# Patient Record
Sex: MALE | Race: WHITE | NOT HISPANIC OR LATINO | Employment: OTHER | URBAN - METROPOLITAN AREA
[De-identification: names, ages, dates, MRNs, and addresses within clinical notes are randomized per-mention and may not be internally consistent; named-entity substitution may affect disease eponyms.]

---

## 2020-01-06 ENCOUNTER — TELEPHONE (OUTPATIENT)
Dept: ORTHOPEDIC SURGERY | Facility: CLINIC | Age: 70
End: 2020-01-06

## 2020-01-20 ENCOUNTER — APPOINTMENT (OUTPATIENT)
Dept: CT IMAGING | Facility: HOSPITAL | Age: 70
End: 2020-01-20

## 2020-01-20 ENCOUNTER — HOSPITAL ENCOUNTER (EMERGENCY)
Facility: HOSPITAL | Age: 70
Discharge: HOME | End: 2020-01-20
Attending: EMERGENCY MEDICINE | Admitting: EMERGENCY MEDICINE

## 2020-01-20 VITALS
RESPIRATION RATE: 16 BRPM | HEART RATE: 60 BPM | DIASTOLIC BLOOD PRESSURE: 65 MMHG | BODY MASS INDEX: 34.15 KG/M2 | TEMPERATURE: 97.6 F | HEIGHT: 64 IN | SYSTOLIC BLOOD PRESSURE: 108 MMHG | WEIGHT: 200 LBS | OXYGEN SATURATION: 99 %

## 2020-01-20 DIAGNOSIS — R51.9 NONINTRACTABLE EPISODIC HEADACHE, UNSPECIFIED HEADACHE TYPE: Primary | ICD-10-CM

## 2020-01-20 PROCEDURE — 25010000002 HYDROMORPHONE PER 4 MG: Performed by: EMERGENCY MEDICINE

## 2020-01-20 PROCEDURE — 63710000001 PREDNISONE PER 1 MG: Performed by: EMERGENCY MEDICINE

## 2020-01-20 PROCEDURE — 25010000002 PROMETHAZINE PER 50 MG: Performed by: EMERGENCY MEDICINE

## 2020-01-20 PROCEDURE — 99283 EMERGENCY DEPT VISIT LOW MDM: CPT

## 2020-01-20 PROCEDURE — 70450 CT HEAD/BRAIN W/O DYE: CPT

## 2020-01-20 PROCEDURE — 96372 THER/PROPH/DIAG INJ SC/IM: CPT

## 2020-01-20 PROCEDURE — 99282 EMERGENCY DEPT VISIT SF MDM: CPT | Performed by: EMERGENCY MEDICINE

## 2020-01-20 RX ORDER — PREDNISONE 20 MG/1
60 TABLET ORAL ONCE
Status: COMPLETED | OUTPATIENT
Start: 2020-01-20 | End: 2020-01-20

## 2020-01-20 RX ORDER — HYDROMORPHONE HYDROCHLORIDE 2 MG/1
2 TABLET ORAL EVERY 4 HOURS PRN
COMMUNITY
End: 2020-01-20

## 2020-01-20 RX ORDER — HYDROMORPHONE HCL 110MG/55ML
2 PATIENT CONTROLLED ANALGESIA SYRINGE INTRAVENOUS ONCE
Status: COMPLETED | OUTPATIENT
Start: 2020-01-20 | End: 2020-01-20

## 2020-01-20 RX ORDER — PROMETHAZINE HYDROCHLORIDE 25 MG/ML
12.5 INJECTION, SOLUTION INTRAMUSCULAR; INTRAVENOUS ONCE
Status: COMPLETED | OUTPATIENT
Start: 2020-01-20 | End: 2020-01-20

## 2020-01-20 RX ORDER — BUTALBITAL, ACETAMINOPHEN AND CAFFEINE 50; 325; 40 MG/1; MG/1; MG/1
TABLET ORAL
Qty: 20 TABLET | Refills: 0 | Status: SHIPPED | OUTPATIENT
Start: 2020-01-20 | End: 2020-05-18

## 2020-01-20 RX ADMIN — PROMETHAZINE HYDROCHLORIDE 12.5 MG: 25 INJECTION INTRAMUSCULAR; INTRAVENOUS at 16:28

## 2020-01-20 RX ADMIN — HYDROMORPHONE HYDROCHLORIDE 2 MG: 2 INJECTION, SOLUTION INTRAMUSCULAR; INTRAVENOUS; SUBCUTANEOUS at 16:28

## 2020-01-20 RX ADMIN — PREDNISONE 60 MG: 20 TABLET ORAL at 16:27
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
## 2020-01-24 ENCOUNTER — TRANSCRIBE ORDERS (OUTPATIENT)
Dept: ADMINISTRATIVE | Facility: HOSPITAL | Age: 70
End: 2020-01-24

## 2020-01-24 DIAGNOSIS — M54.31 BILATERAL SCIATICA: ICD-10-CM

## 2020-01-24 DIAGNOSIS — M48.062 LUMBAR STENOSIS WITH NEUROGENIC CLAUDICATION: ICD-10-CM

## 2020-01-24 DIAGNOSIS — M51.36 DEGENERATIVE LUMBAR DISC: Primary | ICD-10-CM

## 2020-01-24 DIAGNOSIS — M54.32 BILATERAL SCIATICA: ICD-10-CM

## 2020-01-28 ENCOUNTER — HOSPITAL ENCOUNTER (OUTPATIENT)
Dept: GENERAL RADIOLOGY | Facility: HOSPITAL | Age: 70
Discharge: HOME | End: 2020-01-28

## 2020-01-28 ENCOUNTER — TRANSCRIBE ORDERS (OUTPATIENT)
Dept: ADMINISTRATIVE | Facility: HOSPITAL | Age: 70
End: 2020-01-28

## 2020-01-28 ENCOUNTER — HOSPITAL ENCOUNTER (OUTPATIENT)
Dept: MRI IMAGING | Facility: HOSPITAL | Age: 70
Discharge: HOME | End: 2020-01-28
Admitting: ORTHOPAEDIC SURGERY

## 2020-01-28 DIAGNOSIS — M54.32 BILATERAL SCIATICA: ICD-10-CM

## 2020-01-28 DIAGNOSIS — M54.9 BACK PAIN, UNSPECIFIED BACK LOCATION, UNSPECIFIED BACK PAIN LATERALITY, UNSPECIFIED CHRONICITY: ICD-10-CM

## 2020-01-28 DIAGNOSIS — M54.2 NECK PAIN: ICD-10-CM

## 2020-01-28 DIAGNOSIS — M48.062 LUMBAR STENOSIS WITH NEUROGENIC CLAUDICATION: ICD-10-CM

## 2020-01-28 DIAGNOSIS — M54.31 BILATERAL SCIATICA: ICD-10-CM

## 2020-01-28 DIAGNOSIS — M54.9 BACK PAIN, UNSPECIFIED BACK LOCATION, UNSPECIFIED BACK PAIN LATERALITY, UNSPECIFIED CHRONICITY: Primary | ICD-10-CM

## 2020-01-28 DIAGNOSIS — M51.36 DEGENERATIVE LUMBAR DISC: ICD-10-CM

## 2020-01-28 PROCEDURE — 72040 X-RAY EXAM NECK SPINE 2-3 VW: CPT

## 2020-01-28 PROCEDURE — 72110 X-RAY EXAM L-2 SPINE 4/>VWS: CPT

## 2020-01-28 PROCEDURE — 72148 MRI LUMBAR SPINE W/O DYE: CPT

## 2020-03-10 ENCOUNTER — TRANSCRIBE ORDERS (OUTPATIENT)
Dept: ADMINISTRATIVE | Facility: HOSPITAL | Age: 70
End: 2020-03-10

## 2020-03-10 DIAGNOSIS — N18.9 CHRONIC KIDNEY DISEASE, UNSPECIFIED CKD STAGE: ICD-10-CM

## 2020-03-10 DIAGNOSIS — R10.13 EPIGASTRIC PAIN: Primary | ICD-10-CM

## 2020-03-10 DIAGNOSIS — E11.42 TYPE 2 DIABETES MELLITUS WITH DIABETIC POLYNEUROPATHY, UNSPECIFIED WHETHER LONG TERM INSULIN USE (HCC): ICD-10-CM

## 2020-03-18 ENCOUNTER — OFFICE VISIT (OUTPATIENT)
Dept: SURGERY | Facility: CLINIC | Age: 70
End: 2020-03-18

## 2020-03-18 VITALS
DIASTOLIC BLOOD PRESSURE: 86 MMHG | WEIGHT: 207 LBS | BODY MASS INDEX: 35.34 KG/M2 | HEART RATE: 64 BPM | SYSTOLIC BLOOD PRESSURE: 132 MMHG | HEIGHT: 64 IN | RESPIRATION RATE: 18 BRPM | OXYGEN SATURATION: 98 %

## 2020-03-18 DIAGNOSIS — K62.5 RECTAL BLEEDING: ICD-10-CM

## 2020-03-18 DIAGNOSIS — R11.2 NAUSEA AND VOMITING, INTRACTABILITY OF VOMITING NOT SPECIFIED, UNSPECIFIED VOMITING TYPE: Primary | ICD-10-CM

## 2020-03-18 PROCEDURE — 99204 OFFICE O/P NEW MOD 45 MIN: CPT | Performed by: SURGERY

## 2020-03-18 NOTE — PROGRESS NOTES
Chief Complaint   Patient presents with   • Mass     abdominal    • Hemorrhoids   • Nausea        Patient is a 70 y.o. male referred by Devon Monroe DO for nausea, vomiting and rectal bleeding.  Patient reports he has had problems with hemorrhoids intermittently for several years.  Patient does not recall the last time he had a colonoscopy.  Patient reports that he stays nauseated and feels like he needs to vomit and sometimes he does vomit its green.  Patient denies hematemesis.  Patient noticed a big mass on his abdomen and he is scheduled for a CT scan to further evaluate this.  Patient denies fever, chills, unexplained weight loss or night sweats.  Patient has not had any change in bowel habits.  Patient denies any family history of ulcerative colitis, Crohn's disease, familial polyposis or colon cancer.  Patient reports that when he has bowel movements it is bright red blood in the toilet and no associated pain.    Past Medical History:   Diagnosis Date   • Congestive cardiac failure (CMS/HCC)    • Diabetes mellitus (CMS/HCC)    • Heart disease    • Hyperlipidemia    • Hypertension      Past Surgical History:   Procedure Laterality Date   • CARDIAC SURGERY      x2   • CERVICAL SPINE SURGERY       History reviewed. No pertinent family history.  Social History     Tobacco Use   • Smoking status: Never Smoker   • Smokeless tobacco: Never Used   Substance Use Topics   • Alcohol use: Not Currently   • Drug use: Never     No Known Allergies    Current Outpatient Medications:   •  butalbital-acetaminophen-caffeine (FIORICET, ESGIC) -40 MG per tablet, 2 tablets by mouth every 8 hours as needed for headache, Disp: 20 tablet, Rfl: 0  •  clopidogrel (PLAVIX) 75 MG tablet, Take 75 mg by mouth Daily., Disp: , Rfl:   •  glipizide (GLUCOTROL) 10 MG tablet, Take 10 mg by mouth 2 (Two) Times a Day Before Meals., Disp: , Rfl:   •  insulin lispro (humaLOG) 100 UNIT/ML injection, Inject  under the skin into the  "appropriate area as directed 3 (Three) Times a Day Before Meals., Disp: , Rfl:   •  isosorbide dinitrate (ISORDIL) 10 MG tablet, Take 10 mg by mouth 3 (Three) Times a Day., Disp: , Rfl:   •  losartan (COZAAR) 50 MG tablet, Take 100 mg by mouth Daily., Disp: , Rfl:   •  metFORMIN ER (GLUCOPHAGE-XR) 750 MG 24 hr tablet, Take 750 mg by mouth Daily With Breakfast., Disp: , Rfl:   •  metoprolol succinate XL (TOPROL-XL) 50 MG 24 hr tablet, Take 50 mg by mouth Daily., Disp: , Rfl:   •  omeprazole (priLOSEC) 40 MG capsule, Take 40 mg by mouth Daily., Disp: , Rfl:     Review of Systems  General: Patient reports during good health  Eyes: No eye problems  Ears, nose, mouth and throat: No rhinitis, no hearing problems, no chronic cough  Cardiovascular/heart: Denies palpitations, syncope or chest pain  Respiratory/lung: Denies shortness of breath, hemoptysis, dyspnea on exertion   Genital/urinary: No frequency, hematuria or dysuria  Hematological/lymphatic: Denies anemia or other problems  Musculoskeletal: No joint pain, no defects  Skin: No psoriasis or other skin issues  Neurological: No seizures or other neurological problems  Psychiatric: None  Endocrine: Diabetic  Gastro-intestinal: No constipation, no diarrhea, reflux, see HPI  Vitals:    03/18/20 1018   BP: 132/86   BP Location: Left arm   Patient Position: Sitting   Cuff Size: Large Adult   Pulse: 64   Resp: 18   SpO2: 98%   Weight: 93.9 kg (207 lb)   Height: 162.6 cm (64.02\")       Physical Exam  General/physcological:   Alert and oriented x3, in no acute distress  HEENT: Normal cephalic, atraumatic, PERRLA, EOMI, sclera anicteric, moist mucous membranes, neck is supple, no JVD, no carotid bruits, no thyromegaly no adenopathy  Respiratory: CTA and percussion  CVA: RRR, normal S1-S2, no murmurs, no gallops or rubs  GI: Positive BS, soft, nondistended, nontender, no rebound, no guarding, no hernias, no organomegaly and no palpable masses  Patient has rectus " diastases  Musculoskeletal: Moves all 4 ext, no clubbing, no cyanosis or edema  Neurovascular: Grossly intact  Debilities: none  Emotional behavior: appropriate     Patient does not use tobacco products currently.     Assessment:  Nausea, vomiting, reflux  Rectal bleeding  Abdominal mass  Plan:  I recommended the patient undergo further evaluation with a CT scan of the abdomen and pelvis. I also recommend that the patient undergo evaluation of the gallbladder.  I have recommended that the patient undergo an EGD and colonoscopy.  I have discussed these procedures in detail with the patient and his wife who has accompanied him.  I have discussed the risks, benefits and alternatives.  I have discussed the risk of anesthesia, bleeding and perforation.  Patient understands these risks, benefits and alternatives and wishes to proceed.  I have advised the patient unless the CT scan shows some abnormalities that we will postpone the EGD and colonoscopy until further notice secondary to the epidemic of the coronavirus.    Sobia Nuno MD  General, Minimally Invasive and Endoscopic Surgery  [facility]      [address]    [address]  [address] [address]    P: [phone]  F: [phone]    Cc:  Devon Monroe,

## 2020-03-23 ENCOUNTER — HOSPITAL ENCOUNTER (OUTPATIENT)
Dept: CT IMAGING | Facility: HOSPITAL | Age: 70
Discharge: HOME | End: 2020-03-23
Admitting: FAMILY MEDICINE

## 2020-03-23 DIAGNOSIS — N18.9 CHRONIC KIDNEY DISEASE, UNSPECIFIED CKD STAGE: ICD-10-CM

## 2020-03-23 DIAGNOSIS — E11.42 TYPE 2 DIABETES MELLITUS WITH DIABETIC POLYNEUROPATHY, UNSPECIFIED WHETHER LONG TERM INSULIN USE (HCC): ICD-10-CM

## 2020-03-23 DIAGNOSIS — R10.13 EPIGASTRIC PAIN: ICD-10-CM

## 2020-03-23 PROCEDURE — 0 DIATRIZOATE MEGLUMINE & SODIUM PER 1 ML: Performed by: FAMILY MEDICINE

## 2020-03-23 PROCEDURE — 74176 CT ABD & PELVIS W/O CONTRAST: CPT

## 2020-03-23 RX ADMIN — DIATRIZOATE MEGLUMINE AND DIATRIZOATE SODIUM 30 ML: 600; 100 SOLUTION ORAL; RECTAL at 09:10

## 2020-05-06 PROBLEM — R11.2 NAUSEA AND VOMITING: Status: ACTIVE | Noted: 2020-05-06

## 2020-05-06 PROBLEM — K62.5 RECTAL BLEEDING: Status: ACTIVE | Noted: 2020-05-06

## 2020-05-17 ENCOUNTER — HOSPITAL ENCOUNTER (EMERGENCY)
Facility: HOSPITAL | Age: 70
Discharge: HOME | End: 2020-05-17
Attending: EMERGENCY MEDICINE | Admitting: EMERGENCY MEDICINE

## 2020-05-17 ENCOUNTER — APPOINTMENT (OUTPATIENT)
Dept: GENERAL RADIOLOGY | Facility: HOSPITAL | Age: 70
End: 2020-05-17

## 2020-05-17 VITALS
DIASTOLIC BLOOD PRESSURE: 79 MMHG | RESPIRATION RATE: 16 BRPM | HEIGHT: 64 IN | SYSTOLIC BLOOD PRESSURE: 148 MMHG | WEIGHT: 200 LBS | BODY MASS INDEX: 34.15 KG/M2 | HEART RATE: 63 BPM | OXYGEN SATURATION: 97 % | TEMPERATURE: 97.5 F

## 2020-05-17 DIAGNOSIS — I20.8 STABLE ANGINA (HCC): Primary | ICD-10-CM

## 2020-05-17 LAB
ALBUMIN SERPL-MCNC: 4 G/DL (ref 3.5–5.2)
ALBUMIN/GLOB SERPL: 1.3 G/DL
ALP SERPL-CCNC: 78 U/L (ref 39–117)
ALT SERPL W P-5'-P-CCNC: 11 U/L (ref 1–41)
ANION GAP SERPL CALCULATED.3IONS-SCNC: 13.6 MMOL/L (ref 5–15)
AST SERPL-CCNC: 13 U/L (ref 1–40)
BASOPHILS # BLD AUTO: 0.13 10*3/MM3 (ref 0–0.2)
BASOPHILS NFR BLD AUTO: 1.5 % (ref 0–1.5)
BILIRUB SERPL-MCNC: 0.5 MG/DL (ref 0.2–1.2)
BUN BLD-MCNC: 25 MG/DL (ref 8–23)
BUN/CREAT SERPL: 19.2 (ref 7–25)
CALCIUM SPEC-SCNC: 9.7 MG/DL (ref 8.6–10.5)
CHLORIDE SERPL-SCNC: 102 MMOL/L (ref 98–107)
CO2 SERPL-SCNC: 23.4 MMOL/L (ref 22–29)
CREAT BLD-MCNC: 1.3 MG/DL (ref 0.76–1.27)
DEPRECATED RDW RBC AUTO: 46.1 FL (ref 37–54)
EOSINOPHIL # BLD AUTO: 0.61 10*3/MM3 (ref 0–0.4)
EOSINOPHIL NFR BLD AUTO: 7.2 % (ref 0.3–6.2)
ERYTHROCYTE [DISTWIDTH] IN BLOOD BY AUTOMATED COUNT: 13.2 % (ref 12.3–15.4)
GFR SERPL CREATININE-BSD FRML MDRD: 55 ML/MIN/1.73
GLOBULIN UR ELPH-MCNC: 3 GM/DL
GLUCOSE BLD-MCNC: 182 MG/DL (ref 65–99)
HCT VFR BLD AUTO: 50.7 % (ref 37.5–51)
HGB BLD-MCNC: 16.1 G/DL (ref 13–17.7)
IMM GRANULOCYTES # BLD AUTO: 0.07 10*3/MM3 (ref 0–0.05)
IMM GRANULOCYTES NFR BLD AUTO: 0.8 % (ref 0–0.5)
LYMPHOCYTES # BLD AUTO: 3.05 10*3/MM3 (ref 0.7–3.1)
LYMPHOCYTES NFR BLD AUTO: 35.9 % (ref 19.6–45.3)
MCH RBC QN AUTO: 30.2 PG (ref 26.6–33)
MCHC RBC AUTO-ENTMCNC: 31.8 G/DL (ref 31.5–35.7)
MCV RBC AUTO: 95.1 FL (ref 79–97)
MONOCYTES # BLD AUTO: 0.69 10*3/MM3 (ref 0.1–0.9)
MONOCYTES NFR BLD AUTO: 8.1 % (ref 5–12)
NEUTROPHILS # BLD AUTO: 3.94 10*3/MM3 (ref 1.7–7)
NEUTROPHILS NFR BLD AUTO: 46.5 % (ref 42.7–76)
NRBC BLD AUTO-RTO: 0 /100 WBC (ref 0–0.2)
PLATELET # BLD AUTO: 160 10*3/MM3 (ref 140–450)
PMV BLD AUTO: 10.6 FL (ref 6–12)
POTASSIUM BLD-SCNC: 5 MMOL/L (ref 3.5–5.2)
PROT SERPL-MCNC: 7 G/DL (ref 6–8.5)
RBC # BLD AUTO: 5.33 10*6/MM3 (ref 4.14–5.8)
SODIUM BLD-SCNC: 139 MMOL/L (ref 136–145)
TROPONIN T SERPL-MCNC: <0.01 NG/ML (ref 0–0.03)
WBC NRBC COR # BLD: 8.49 10*3/MM3 (ref 3.4–10.8)

## 2020-05-17 PROCEDURE — 71045 X-RAY EXAM CHEST 1 VIEW: CPT

## 2020-05-17 PROCEDURE — 25010000002 ONDANSETRON PER 1 MG: Performed by: EMERGENCY MEDICINE

## 2020-05-17 PROCEDURE — 93010 ELECTROCARDIOGRAM REPORT: CPT | Performed by: INTERNAL MEDICINE

## 2020-05-17 PROCEDURE — 80053 COMPREHEN METABOLIC PANEL: CPT | Performed by: EMERGENCY MEDICINE

## 2020-05-17 PROCEDURE — 84484 ASSAY OF TROPONIN QUANT: CPT | Performed by: EMERGENCY MEDICINE

## 2020-05-17 PROCEDURE — 96375 TX/PRO/DX INJ NEW DRUG ADDON: CPT

## 2020-05-17 PROCEDURE — 93005 ELECTROCARDIOGRAM TRACING: CPT | Performed by: EMERGENCY MEDICINE

## 2020-05-17 PROCEDURE — 85025 COMPLETE CBC W/AUTO DIFF WBC: CPT | Performed by: EMERGENCY MEDICINE

## 2020-05-17 PROCEDURE — 99284 EMERGENCY DEPT VISIT MOD MDM: CPT | Performed by: EMERGENCY MEDICINE

## 2020-05-17 PROCEDURE — 96374 THER/PROPH/DIAG INJ IV PUSH: CPT

## 2020-05-17 PROCEDURE — 99283 EMERGENCY DEPT VISIT LOW MDM: CPT

## 2020-05-17 RX ORDER — FAMOTIDINE 10 MG/ML
20 INJECTION, SOLUTION INTRAVENOUS ONCE
Status: COMPLETED | OUTPATIENT
Start: 2020-05-17 | End: 2020-05-17

## 2020-05-17 RX ORDER — ASPIRIN 325 MG
325 TABLET ORAL ONCE
Status: DISCONTINUED | OUTPATIENT
Start: 2020-05-17 | End: 2020-05-17

## 2020-05-17 RX ORDER — ONDANSETRON 2 MG/ML
8 INJECTION INTRAMUSCULAR; INTRAVENOUS ONCE
Status: COMPLETED | OUTPATIENT
Start: 2020-05-17 | End: 2020-05-17

## 2020-05-17 RX ADMIN — FAMOTIDINE 20 MG: 10 INJECTION INTRAVENOUS at 10:47

## 2020-05-17 RX ADMIN — ONDANSETRON 8 MG: 2 INJECTION, SOLUTION INTRAMUSCULAR; INTRAVENOUS at 10:47

## 2020-05-17 NOTE — ED PROVIDER NOTES
"Subjective     Chest Pain   Pain location:  R chest  Pain quality: aching    Pain radiates to:  Does not radiate  Pain severity:  Moderate  Onset quality:  Gradual  Duration: \"few hours today\" at rest, intermittent several months.  Timing:  Intermittent  Progression:  Waxing and waning  Chronicity:  Recurrent  Context comment:  Spont onset, took asa/nitro pta, better now  Relieved by:  Nothing  Worsened by:  Nothing  Associated symptoms: nausea    Associated symptoms: no abdominal pain, no back pain, no cough, no diaphoresis, no shortness of breath and no vomiting        Review of Systems   Constitutional: Negative for diaphoresis.   Respiratory: Negative for cough and shortness of breath.    Cardiovascular: Positive for chest pain.   Gastrointestinal: Positive for nausea. Negative for abdominal pain and vomiting.   Musculoskeletal: Negative for back pain.   All other systems reviewed and are negative.      Past Medical History:   Diagnosis Date   • Congestive cardiac failure (CMS/HCC)    • Diabetes mellitus (CMS/HCC)    • Heart disease    • Hyperlipidemia    • Hypertension        No Known Allergies    Past Surgical History:   Procedure Laterality Date   • CARDIAC SURGERY      x2   • CERVICAL SPINE SURGERY         Family History   Problem Relation Age of Onset   • Heart attack Mother    • Emphysema Father    • Heart attack Father    • Stroke Father    • Arthritis Father    • Arthritis Sister    • Sudden death Brother         shot   • No Known Problems Daughter    • No Known Problems Son    • No Known Problems Maternal Grandmother    • No Known Problems Maternal Grandfather    • Emphysema Paternal Grandmother    • Diabetes Paternal Grandfather    • No Known Problems Sister    • No Known Problems Sister    • No Known Problems Sister    • No Known Problems Sister    • No Known Problems Sister    • No Known Problems Daughter    • No Known Problems Daughter        Social History     Socioeconomic History   • Marital " status:      Spouse name: Not on file   • Number of children: Not on file   • Years of education: Not on file   • Highest education level: Not on file   Tobacco Use   • Smoking status: Never Smoker   • Smokeless tobacco: Never Used   Substance and Sexual Activity   • Alcohol use: Not Currently   • Drug use: Never   • Sexual activity: Defer           Objective   Physical Exam   Constitutional: He is oriented to person, place, and time. He appears well-developed and well-nourished.  Non-toxic appearance. He does not appear ill. No distress.   HENT:   Head: Normocephalic and atraumatic.   Eyes: Pupils are equal, round, and reactive to light. EOM are normal.   Neck: Normal range of motion. No JVD present.   Cardiovascular: Normal rate, regular rhythm, intact distal pulses and normal pulses. Exam reveals no gallop.   No murmur heard.  Pulmonary/Chest: Effort normal and breath sounds normal. He has no decreased breath sounds. He has no wheezes. He has no rhonchi.   Musculoskeletal: Normal range of motion.        Right lower leg: Normal. He exhibits no edema.        Left lower leg: Normal. He exhibits no edema.   Neurological: He is alert and oriented to person, place, and time. He is not disoriented.   Skin: Skin is warm. No rash noted. He is not diaphoretic. No erythema.   Psychiatric: He has a normal mood and affect. His mood appears not anxious.   Nursing note and vitals reviewed.      Procedures           ED Course  ED Course as of May 17 1146   Sun May 17, 2020   1042 Ekg by mensr, low volt, no st elev    [BC]      ED Course User Index  [BC] Jimmy Ackerman MD         reeval, sleeping in room, no compliaints  Says he has a cards to f/u with  Ok with plan to f/u and return for worse                                  MDM  Number of Diagnoses or Management Options     Amount and/or Complexity of Data Reviewed  Clinical lab tests: ordered and reviewed  Tests in the radiology section of CPT®: ordered and  reviewed    Risk of Complications, Morbidity, and/or Mortality  Presenting problems: moderate  Diagnostic procedures: moderate  Management options: moderate    Patient Progress  Patient progress: stable      Final diagnoses:   Stable angina (CMS/[facility])            Jimmy Ackerman MD  05/17/20 1149

## 2020-05-18 ENCOUNTER — APPOINTMENT (OUTPATIENT)
Dept: CARDIOLOGY | Facility: HOSPITAL | Age: 70
End: 2020-05-18

## 2020-05-18 ENCOUNTER — HOSPITAL ENCOUNTER (OUTPATIENT)
Facility: HOSPITAL | Age: 70
LOS: 1 days | Discharge: HOME | End: 2020-05-20
Attending: EMERGENCY MEDICINE | Admitting: INTERNAL MEDICINE

## 2020-05-18 ENCOUNTER — TRANSCRIBE ORDERS (OUTPATIENT)
Dept: ADMINISTRATIVE | Facility: HOSPITAL | Age: 70
End: 2020-05-18

## 2020-05-18 ENCOUNTER — APPOINTMENT (OUTPATIENT)
Dept: GENERAL RADIOLOGY | Facility: HOSPITAL | Age: 70
End: 2020-05-18

## 2020-05-18 DIAGNOSIS — Z79.4 TYPE 2 DIABETES MELLITUS WITH OTHER CIRCULATORY COMPLICATION, WITH LONG-TERM CURRENT USE OF INSULIN (HCC): Chronic | ICD-10-CM

## 2020-05-18 DIAGNOSIS — N18.30 CHRONIC RENAL FAILURE, STAGE 3 (MODERATE) (HCC): Chronic | ICD-10-CM

## 2020-05-18 DIAGNOSIS — R94.31 ABNORMAL EKG: ICD-10-CM

## 2020-05-18 DIAGNOSIS — D75.1 POLYCYTHEMIA: ICD-10-CM

## 2020-05-18 DIAGNOSIS — K62.5 RECTAL BLEEDING: ICD-10-CM

## 2020-05-18 DIAGNOSIS — I20.0 UNSTABLE ANGINA (HCC): Primary | ICD-10-CM

## 2020-05-18 DIAGNOSIS — Z01.818 OTHER SPECIFIED PRE-OPERATIVE EXAMINATION: Primary | ICD-10-CM

## 2020-05-18 DIAGNOSIS — E11.59 TYPE 2 DIABETES MELLITUS WITH OTHER CIRCULATORY COMPLICATION, WITH LONG-TERM CURRENT USE OF INSULIN (HCC): Chronic | ICD-10-CM

## 2020-05-18 DIAGNOSIS — R11.2 NON-INTRACTABLE VOMITING WITH NAUSEA, UNSPECIFIED VOMITING TYPE: ICD-10-CM

## 2020-05-18 PROBLEM — N18.9 CHRONIC RENAL FAILURE: Chronic | Status: ACTIVE | Noted: 2020-05-18

## 2020-05-18 LAB
ALBUMIN SERPL-MCNC: 4.4 G/DL (ref 3.5–5.2)
ALBUMIN/GLOB SERPL: 1.4 G/DL
ALP SERPL-CCNC: 83 U/L (ref 39–117)
ALT SERPL W P-5'-P-CCNC: 12 U/L (ref 1–41)
ANION GAP SERPL CALCULATED.3IONS-SCNC: 13.3 MMOL/L (ref 5–15)
ANION GAP SERPL CALCULATED.3IONS-SCNC: 15.3 MMOL/L (ref 5–15)
AST SERPL-CCNC: 15 U/L (ref 1–40)
BASOPHILS # BLD AUTO: 0.06 10*3/MM3 (ref 0–0.2)
BASOPHILS NFR BLD AUTO: 0.5 % (ref 0–1.5)
BH CV ECHO MEAS - ACS: 2 CM
BH CV ECHO MEAS - AO MAX PG (FULL): 0.52 MMHG
BH CV ECHO MEAS - AO MAX PG: 5 MMHG
BH CV ECHO MEAS - AO MEAN PG (FULL): 1 MMHG
BH CV ECHO MEAS - AO MEAN PG: 3 MMHG
BH CV ECHO MEAS - AO ROOT AREA (BSA CORRECTED): 1.5
BH CV ECHO MEAS - AO ROOT AREA: 7.1 CM^2
BH CV ECHO MEAS - AO ROOT DIAM: 3 CM
BH CV ECHO MEAS - AO V2 MAX: 112 CM/SEC
BH CV ECHO MEAS - AO V2 MEAN: 73.7 CM/SEC
BH CV ECHO MEAS - AO V2 VTI: 20.5 CM
BH CV ECHO MEAS - ASC AORTA: 3 CM
BH CV ECHO MEAS - AVA(I,A): 2.8 CM^2
BH CV ECHO MEAS - AVA(I,D): 2.8 CM^2
BH CV ECHO MEAS - AVA(V,A): 3.3 CM^2
BH CV ECHO MEAS - AVA(V,D): 3.3 CM^2
BH CV ECHO MEAS - BSA(HAYCOCK): 2 M^2
BH CV ECHO MEAS - BSA: 1.9 M^2
BH CV ECHO MEAS - BZI_BMI: 33.8 KILOGRAMS/M^2
BH CV ECHO MEAS - BZI_METRIC_HEIGHT: 162.6 CM
BH CV ECHO MEAS - BZI_METRIC_WEIGHT: 89.4 KG
BH CV ECHO MEAS - EDV(CUBED): 85.2 ML
BH CV ECHO MEAS - EDV(MOD-SP2): 49 ML
BH CV ECHO MEAS - EDV(MOD-SP4): 71 ML
BH CV ECHO MEAS - EDV(TEICH): 87.7 ML
BH CV ECHO MEAS - EF(CUBED): 71.4 %
BH CV ECHO MEAS - EF(MOD-BP): 76 %
BH CV ECHO MEAS - EF(MOD-SP2): 73.5 %
BH CV ECHO MEAS - EF(MOD-SP4): 76.1 %
BH CV ECHO MEAS - EF(TEICH): 63.3 %
BH CV ECHO MEAS - ESV(CUBED): 24.4 ML
BH CV ECHO MEAS - ESV(MOD-SP2): 13 ML
BH CV ECHO MEAS - ESV(MOD-SP4): 17 ML
BH CV ECHO MEAS - ESV(TEICH): 32.2 ML
BH CV ECHO MEAS - FS: 34.1 %
BH CV ECHO MEAS - IVS/LVPW: 1.2
BH CV ECHO MEAS - IVSD: 1.3 CM
BH CV ECHO MEAS - LAT PEAK E' VEL: 5 CM/SEC
BH CV ECHO MEAS - LV DIASTOLIC VOL/BSA (35-75): 36.5 ML/M^2
BH CV ECHO MEAS - LV MASS(C)D: 191.3 GRAMS
BH CV ECHO MEAS - LV MASS(C)DI: 98.4 GRAMS/M^2
BH CV ECHO MEAS - LV MAX PG: 4.5 MMHG
BH CV ECHO MEAS - LV MEAN PG: 2 MMHG
BH CV ECHO MEAS - LV SYSTOLIC VOL/BSA (12-30): 8.7 ML/M^2
BH CV ECHO MEAS - LV V1 MAX: 106 CM/SEC
BH CV ECHO MEAS - LV V1 MEAN: 60.9 CM/SEC
BH CV ECHO MEAS - LV V1 VTI: 16.8 CM
BH CV ECHO MEAS - LVIDD: 4.4 CM
BH CV ECHO MEAS - LVIDS: 2.9 CM
BH CV ECHO MEAS - LVLD AP2: 6.7 CM
BH CV ECHO MEAS - LVLD AP4: 7.5 CM
BH CV ECHO MEAS - LVLS AP2: 5.6 CM
BH CV ECHO MEAS - LVLS AP4: 5.5 CM
BH CV ECHO MEAS - LVOT AREA (M): 3.5 CM^2
BH CV ECHO MEAS - LVOT AREA: 3.5 CM^2
BH CV ECHO MEAS - LVOT DIAM: 2.1 CM
BH CV ECHO MEAS - LVPWD: 1.1 CM
BH CV ECHO MEAS - MED PEAK E' VEL: 6 CM/SEC
BH CV ECHO MEAS - MV A DUR: 0.13 SEC
BH CV ECHO MEAS - MV A MAX VEL: 70.3 CM/SEC
BH CV ECHO MEAS - MV DEC SLOPE: 219 CM/SEC^2
BH CV ECHO MEAS - MV DEC TIME: 313 SEC
BH CV ECHO MEAS - MV E MAX VEL: 46.6 CM/SEC
BH CV ECHO MEAS - MV E/A: 0.66
BH CV ECHO MEAS - MV MAX PG: 3.6 MMHG
BH CV ECHO MEAS - MV MEAN PG: 1 MMHG
BH CV ECHO MEAS - MV P1/2T MAX VEL: 62.3 CM/SEC
BH CV ECHO MEAS - MV P1/2T: 83.3 MSEC
BH CV ECHO MEAS - MV V2 MAX: 94.8 CM/SEC
BH CV ECHO MEAS - MV V2 MEAN: 44.9 CM/SEC
BH CV ECHO MEAS - MV V2 VTI: 22.5 CM
BH CV ECHO MEAS - MVA P1/2T LCG: 3.5 CM^2
BH CV ECHO MEAS - MVA(P1/2T): 2.6 CM^2
BH CV ECHO MEAS - MVA(VTI): 2.6 CM^2
BH CV ECHO MEAS - PA ACC TIME: 0.14 SEC
BH CV ECHO MEAS - PA MAX PG (FULL): -0.42 MMHG
BH CV ECHO MEAS - PA MAX PG: 1.7 MMHG
BH CV ECHO MEAS - PA PR(ACCEL): 14.2 MMHG
BH CV ECHO MEAS - PA V2 MAX: 65.6 CM/SEC
BH CV ECHO MEAS - PULM DIAS VEL: 35.2 CM/SEC
BH CV ECHO MEAS - PULM S/D: 1.9
BH CV ECHO MEAS - PULM SYS VEL: 65.7 CM/SEC
BH CV ECHO MEAS - RAP SYSTOLE: 3 MMHG
BH CV ECHO MEAS - RV MAX PG: 2.1 MMHG
BH CV ECHO MEAS - RV MEAN PG: 1 MMHG
BH CV ECHO MEAS - RV V1 MAX: 73.2 CM/SEC
BH CV ECHO MEAS - RV V1 MEAN: 43.3 CM/SEC
BH CV ECHO MEAS - RV V1 VTI: 14.3 CM
BH CV ECHO MEAS - SI(AO): 74.6 ML/M^2
BH CV ECHO MEAS - SI(CUBED): 31.3 ML/M^2
BH CV ECHO MEAS - SI(LVOT): 29.9 ML/M^2
BH CV ECHO MEAS - SI(MOD-SP2): 18.5 ML/M^2
BH CV ECHO MEAS - SI(MOD-SP4): 27.8 ML/M^2
BH CV ECHO MEAS - SI(TEICH): 28.5 ML/M^2
BH CV ECHO MEAS - SV(AO): 144.9 ML
BH CV ECHO MEAS - SV(CUBED): 60.8 ML
BH CV ECHO MEAS - SV(LVOT): 58.2 ML
BH CV ECHO MEAS - SV(MOD-SP2): 36 ML
BH CV ECHO MEAS - SV(MOD-SP4): 54 ML
BH CV ECHO MEAS - SV(TEICH): 55.5 ML
BH CV ECHO MEAS - TAPSE (>1.6): 2.2 CM2
BH CV ECHO MEASUREMENTS AVERAGE E/E' RATIO: 8.47
BH CV VAS BP RIGHT ARM: NORMAL MMHG
BH CV XLRA - TDI S': 14 CM/SEC
BILIRUB SERPL-MCNC: 0.6 MG/DL (ref 0.2–1.2)
BUN BLD-MCNC: 23 MG/DL (ref 8–23)
BUN BLD-MCNC: 23 MG/DL (ref 8–23)
BUN/CREAT SERPL: 16.9 (ref 7–25)
BUN/CREAT SERPL: 17.3 (ref 7–25)
CALCIUM SPEC-SCNC: 9.2 MG/DL (ref 8.6–10.5)
CALCIUM SPEC-SCNC: 9.5 MG/DL (ref 8.6–10.5)
CHLORIDE SERPL-SCNC: 100 MMOL/L (ref 98–107)
CHLORIDE SERPL-SCNC: 103 MMOL/L (ref 98–107)
CO2 SERPL-SCNC: 20.7 MMOL/L (ref 22–29)
CO2 SERPL-SCNC: 23.7 MMOL/L (ref 22–29)
CREAT BLD-MCNC: 1.33 MG/DL (ref 0.76–1.27)
CREAT BLD-MCNC: 1.36 MG/DL (ref 0.76–1.27)
DEPRECATED RDW RBC AUTO: 42.1 FL (ref 37–54)
EOSINOPHIL # BLD AUTO: 1.45 10*3/MM3 (ref 0–0.4)
EOSINOPHIL NFR BLD AUTO: 11.3 % (ref 0.3–6.2)
ERYTHROCYTE [DISTWIDTH] IN BLOOD BY AUTOMATED COUNT: 13.2 % (ref 12.3–15.4)
GFR SERPL CREATININE-BSD FRML MDRD: 52 ML/MIN/1.73
GFR SERPL CREATININE-BSD FRML MDRD: 53 ML/MIN/1.73
GLOBULIN UR ELPH-MCNC: 3.1 GM/DL
GLUCOSE BLD-MCNC: 120 MG/DL (ref 65–99)
GLUCOSE BLD-MCNC: 164 MG/DL (ref 65–99)
GLUCOSE BLDC GLUCOMTR-MCNC: 135 MG/DL (ref 70–130)
HBA1C MFR BLD: 9.49 % (ref 4.8–5.6)
HCT VFR BLD AUTO: 52.4 % (ref 37.5–51)
HGB BLD-MCNC: 18 G/DL (ref 13–17.7)
HOLD SPECIMEN: NORMAL
HOLD SPECIMEN: NORMAL
IMM GRANULOCYTES # BLD AUTO: 0.08 10*3/MM3 (ref 0–0.05)
IMM GRANULOCYTES NFR BLD AUTO: 0.6 % (ref 0–0.5)
INR PPP: 1.03 (ref 0.9–1.1)
LEFT ATRIUM VOLUME INDEX: 17 ML/M2
LV EF 2D ECHO EST: 76 %
LYMPHOCYTES # BLD AUTO: 2.57 10*3/MM3 (ref 0.7–3.1)
LYMPHOCYTES NFR BLD AUTO: 20 % (ref 19.6–45.3)
MAXIMAL PREDICTED HEART RATE: 150 BPM
MCH RBC QN AUTO: 30.6 PG (ref 26.6–33)
MCHC RBC AUTO-ENTMCNC: 34.4 G/DL (ref 31.5–35.7)
MCV RBC AUTO: 89 FL (ref 79–97)
MONOCYTES # BLD AUTO: 0.91 10*3/MM3 (ref 0.1–0.9)
MONOCYTES NFR BLD AUTO: 7.1 % (ref 5–12)
NEUTROPHILS # BLD AUTO: 7.81 10*3/MM3 (ref 1.7–7)
NEUTROPHILS NFR BLD AUTO: 60.5 % (ref 42.7–76)
NRBC BLD AUTO-RTO: 0 /100 WBC (ref 0–0.2)
PLATELET # BLD AUTO: 170 10*3/MM3 (ref 140–450)
PMV BLD AUTO: 10.3 FL (ref 6–12)
POTASSIUM BLD-SCNC: 4.8 MMOL/L (ref 3.5–5.2)
POTASSIUM BLD-SCNC: 5.1 MMOL/L (ref 3.5–5.2)
PROT SERPL-MCNC: 7.5 G/DL (ref 6–8.5)
PROTHROMBIN TIME: 13.2 SECONDS (ref 11.7–14.2)
RBC # BLD AUTO: 5.89 10*6/MM3 (ref 4.14–5.8)
SARS-COV-2 RNA RESP QL NAA+PROBE: NOT DETECTED
SODIUM BLD-SCNC: 137 MMOL/L (ref 136–145)
SODIUM BLD-SCNC: 139 MMOL/L (ref 136–145)
STRESS TARGET HR: 128 BPM
TROPONIN T SERPL-MCNC: <0.01 NG/ML (ref 0–0.03)
WBC NRBC COR # BLD: 12.88 10*3/MM3 (ref 3.4–10.8)
WHOLE BLOOD HOLD SPECIMEN: NORMAL
WHOLE BLOOD HOLD SPECIMEN: NORMAL

## 2020-05-18 PROCEDURE — 88108 CYTOPATH CONCENTRATE TECH: CPT

## 2020-05-18 PROCEDURE — 87635 SARS-COV-2 COVID-19 AMP PRB: CPT | Performed by: EMERGENCY MEDICINE

## 2020-05-18 PROCEDURE — 88184 FLOWCYTOMETRY/ TC 1 MARKER: CPT | Performed by: INTERNAL MEDICINE

## 2020-05-18 PROCEDURE — 99220 PR INITIAL OBSERVATION CARE/DAY 70 MINUTES: CPT | Performed by: INTERNAL MEDICINE

## 2020-05-18 PROCEDURE — 93005 ELECTROCARDIOGRAM TRACING: CPT

## 2020-05-18 PROCEDURE — 88185 FLOWCYTOMETRY/TC ADD-ON: CPT | Performed by: INTERNAL MEDICINE

## 2020-05-18 PROCEDURE — 25010000002 ONDANSETRON PER 1 MG: Performed by: EMERGENCY MEDICINE

## 2020-05-18 PROCEDURE — 99285 EMERGENCY DEPT VISIT HI MDM: CPT

## 2020-05-18 PROCEDURE — 93306 TTE W/DOPPLER COMPLETE: CPT | Performed by: INTERNAL MEDICINE

## 2020-05-18 PROCEDURE — 96372 THER/PROPH/DIAG INJ SC/IM: CPT

## 2020-05-18 PROCEDURE — 96374 THER/PROPH/DIAG INJ IV PUSH: CPT

## 2020-05-18 PROCEDURE — 93306 TTE W/DOPPLER COMPLETE: CPT

## 2020-05-18 PROCEDURE — 85025 COMPLETE CBC W/AUTO DIFF WBC: CPT | Performed by: EMERGENCY MEDICINE

## 2020-05-18 PROCEDURE — 71045 X-RAY EXAM CHEST 1 VIEW: CPT

## 2020-05-18 PROCEDURE — 82962 GLUCOSE BLOOD TEST: CPT

## 2020-05-18 PROCEDURE — 84484 ASSAY OF TROPONIN QUANT: CPT | Performed by: EMERGENCY MEDICINE

## 2020-05-18 PROCEDURE — 85610 PROTHROMBIN TIME: CPT | Performed by: EMERGENCY MEDICINE

## 2020-05-18 PROCEDURE — 88182 CELL MARKER STUDY: CPT

## 2020-05-18 PROCEDURE — 83036 HEMOGLOBIN GLYCOSYLATED A1C: CPT | Performed by: INTERNAL MEDICINE

## 2020-05-18 PROCEDURE — 84484 ASSAY OF TROPONIN QUANT: CPT | Performed by: INTERNAL MEDICINE

## 2020-05-18 PROCEDURE — 93010 ELECTROCARDIOGRAM REPORT: CPT | Performed by: INTERNAL MEDICINE

## 2020-05-18 PROCEDURE — 25010000002 ENOXAPARIN PER 10 MG: Performed by: INTERNAL MEDICINE

## 2020-05-18 PROCEDURE — 93005 ELECTROCARDIOGRAM TRACING: CPT | Performed by: EMERGENCY MEDICINE

## 2020-05-18 PROCEDURE — 25010000002 PERFLUTREN (DEFINITY) 8.476 MG IN SODIUM CHLORIDE 0.9 % 10 ML INJECTION: Performed by: INTERNAL MEDICINE

## 2020-05-18 PROCEDURE — 80053 COMPREHEN METABOLIC PANEL: CPT | Performed by: EMERGENCY MEDICINE

## 2020-05-18 RX ORDER — SIMVASTATIN 40 MG
40 TABLET ORAL NIGHTLY
COMMUNITY
End: 2020-06-01

## 2020-05-18 RX ORDER — HYDROCODONE BITARTRATE AND ACETAMINOPHEN 7.5; 325 MG/1; MG/1
1 TABLET ORAL EVERY 6 HOURS PRN
Status: DISCONTINUED | OUTPATIENT
Start: 2020-05-18 | End: 2020-05-20 | Stop reason: HOSPADM

## 2020-05-18 RX ORDER — METOPROLOL SUCCINATE 50 MG/1
50 TABLET, EXTENDED RELEASE ORAL 2 TIMES DAILY
Status: DISCONTINUED | OUTPATIENT
Start: 2020-05-18 | End: 2020-05-20 | Stop reason: HOSPADM

## 2020-05-18 RX ORDER — ASPIRIN 81 MG/1
81 TABLET, CHEWABLE ORAL DAILY
COMMUNITY
End: 2021-02-07 | Stop reason: HOSPADM

## 2020-05-18 RX ORDER — LOSARTAN POTASSIUM 100 MG/1
100 TABLET ORAL DAILY
Status: DISCONTINUED | OUTPATIENT
Start: 2020-05-18 | End: 2020-05-18

## 2020-05-18 RX ORDER — ONDANSETRON 2 MG/ML
4 INJECTION INTRAMUSCULAR; INTRAVENOUS EVERY 6 HOURS PRN
Status: DISCONTINUED | OUTPATIENT
Start: 2020-05-18 | End: 2020-05-20 | Stop reason: HOSPADM

## 2020-05-18 RX ORDER — ASPIRIN 81 MG/1
81 TABLET, CHEWABLE ORAL DAILY
Status: DISCONTINUED | OUTPATIENT
Start: 2020-05-18 | End: 2020-05-20 | Stop reason: HOSPADM

## 2020-05-18 RX ORDER — CLOPIDOGREL BISULFATE 75 MG/1
75 TABLET ORAL DAILY
Status: DISCONTINUED | OUTPATIENT
Start: 2020-05-18 | End: 2020-05-20 | Stop reason: HOSPADM

## 2020-05-18 RX ORDER — SODIUM CHLORIDE 9 MG/ML
50 INJECTION, SOLUTION INTRAVENOUS CONTINUOUS
Status: DISCONTINUED | OUTPATIENT
Start: 2020-05-18 | End: 2020-05-19

## 2020-05-18 RX ORDER — GLIPIZIDE 10 MG/1
10 TABLET ORAL
Status: DISCONTINUED | OUTPATIENT
Start: 2020-05-18 | End: 2020-05-20

## 2020-05-18 RX ORDER — DULOXETIN HYDROCHLORIDE 20 MG/1
20 CAPSULE, DELAYED RELEASE ORAL DAILY
COMMUNITY
End: 2021-11-30

## 2020-05-18 RX ORDER — PANTOPRAZOLE SODIUM 40 MG/1
40 TABLET, DELAYED RELEASE ORAL EVERY MORNING
Status: DISCONTINUED | OUTPATIENT
Start: 2020-05-19 | End: 2020-05-20 | Stop reason: HOSPADM

## 2020-05-18 RX ORDER — INSULIN GLARGINE 100 [IU]/ML
30 INJECTION, SOLUTION SUBCUTANEOUS 2 TIMES DAILY
COMMUNITY

## 2020-05-18 RX ORDER — ONDANSETRON 2 MG/ML
4 INJECTION INTRAMUSCULAR; INTRAVENOUS ONCE
Status: COMPLETED | OUTPATIENT
Start: 2020-05-18 | End: 2020-05-18

## 2020-05-18 RX ORDER — SODIUM CHLORIDE 0.9 % (FLUSH) 0.9 %
10 SYRINGE (ML) INJECTION AS NEEDED
Status: DISCONTINUED | OUTPATIENT
Start: 2020-05-18 | End: 2020-05-20 | Stop reason: HOSPADM

## 2020-05-18 RX ORDER — HYDROCODONE BITARTRATE AND ACETAMINOPHEN 7.5; 325 MG/1; MG/1
1 TABLET ORAL EVERY 6 HOURS PRN
COMMUNITY
End: 2021-02-07 | Stop reason: HOSPADM

## 2020-05-18 RX ORDER — NICOTINE POLACRILEX 4 MG
15 LOZENGE BUCCAL
Status: DISCONTINUED | OUTPATIENT
Start: 2020-05-18 | End: 2020-05-20 | Stop reason: HOSPADM

## 2020-05-18 RX ORDER — SODIUM CHLORIDE 0.9 % (FLUSH) 0.9 %
10 SYRINGE (ML) INJECTION EVERY 12 HOURS SCHEDULED
Status: DISCONTINUED | OUTPATIENT
Start: 2020-05-18 | End: 2020-05-20 | Stop reason: HOSPADM

## 2020-05-18 RX ORDER — ASPIRIN 325 MG
325 TABLET ORAL ONCE
Status: COMPLETED | OUTPATIENT
Start: 2020-05-18 | End: 2020-05-18

## 2020-05-18 RX ORDER — DULOXETIN HYDROCHLORIDE 20 MG/1
20 CAPSULE, DELAYED RELEASE ORAL DAILY
Status: DISCONTINUED | OUTPATIENT
Start: 2020-05-18 | End: 2020-05-20 | Stop reason: HOSPADM

## 2020-05-18 RX ORDER — DEXTROSE MONOHYDRATE 25 G/50ML
25 INJECTION, SOLUTION INTRAVENOUS
Status: DISCONTINUED | OUTPATIENT
Start: 2020-05-18 | End: 2020-05-20 | Stop reason: HOSPADM

## 2020-05-18 RX ORDER — ATORVASTATIN CALCIUM 20 MG/1
20 TABLET, FILM COATED ORAL DAILY
Status: DISCONTINUED | OUTPATIENT
Start: 2020-05-18 | End: 2020-05-20 | Stop reason: HOSPADM

## 2020-05-18 RX ADMIN — NITROGLYCERIN 1 INCH: 20 OINTMENT TOPICAL at 11:41

## 2020-05-18 RX ADMIN — ONDANSETRON 4 MG: 2 INJECTION INTRAMUSCULAR; INTRAVENOUS at 14:12

## 2020-05-18 RX ADMIN — PERFLUTREN 3 ML: 6.52 INJECTION, SUSPENSION INTRAVENOUS at 18:15

## 2020-05-18 RX ADMIN — SODIUM CHLORIDE 50 ML/HR: 9 INJECTION, SOLUTION INTRAVENOUS at 20:39

## 2020-05-18 RX ADMIN — CLOPIDOGREL 75 MG: 75 TABLET, FILM COATED ORAL at 19:16

## 2020-05-18 RX ADMIN — ENOXAPARIN SODIUM 40 MG: 40 INJECTION SUBCUTANEOUS at 19:11

## 2020-05-18 RX ADMIN — ASPIRIN 325 MG: 325 TABLET ORAL at 11:39

## 2020-05-18 RX ADMIN — METOPROLOL SUCCINATE 50 MG: 50 TABLET, EXTENDED RELEASE ORAL at 20:39

## 2020-05-18 RX ADMIN — SODIUM CHLORIDE, PRESERVATIVE FREE 10 ML: 5 INJECTION INTRAVENOUS at 20:39

## 2020-05-18 RX ADMIN — NITROGLYCERIN 1 INCH: 20 OINTMENT TOPICAL at 19:11

## 2020-05-18 RX ADMIN — GLIPIZIDE 10 MG: 10 TABLET ORAL at 19:16

## 2020-05-18 NOTE — CONSULTS
Referring Provider: Dr. Augusto Gloria  Reason for Consultation: CKD3    Subjective     Chief complaint   Chief Complaint   Patient presents with   • Chest Pain       History of present illness:  71 yo WM with CKD3 (baseline SCR 1.3) admitted earlier today for further evaluation of unstable angina.  He reports on and off chest pain for several months that has gradually worsened in intensity, with an episode this morning awakening him from sleep.  This episode was associated with nausea and radiation to left shoulder.  Renal consulted given SCR 1.3 with plans for cardiac catheterization tomorrow.  Full PMH outlined below; pertinent is DM2 x20 years without DR; longstanding hypertension; CAD with prior stenting; remote nephrolithiasis x2; and untreated MANDEEP.  · No urinary complaints other than occasional nocturia  · Weight stable for the last 6 months  · Exertional chest pain for several months that now occurs in the absence of exertion  · No leg swelling or orthopnea  · Appetite is good; no N/V, though he did have nausea with the chest pain this morning    Past Medical History:   Diagnosis Date   • Congestive cardiac failure (CMS/HCC)    • Diabetes mellitus (CMS/HCC)    • Heart disease    • Hyperlipidemia    • Hypertension      Past Surgical History:   Procedure Laterality Date   • CARDIAC SURGERY      x2   • CERVICAL SPINE SURGERY       Family History   Problem Relation Age of Onset   • Heart attack Mother    • Emphysema Father    • Heart attack Father    • Stroke Father    • Arthritis Father    • Arthritis Sister    • Sudden death Brother         shot   • No Known Problems Daughter    • No Known Problems Son    • No Known Problems Maternal Grandmother    • No Known Problems Maternal Grandfather    • Emphysema Paternal Grandmother    • Diabetes Paternal Grandfather    • No Known Problems Sister    • No Known Problems Sister    • No Known Problems Sister    • No Known Problems Sister    • No Known Problems Sister    •  No Known Problems Daughter    • No Known Problems Daughter      Social History     Tobacco Use   • Smoking status: Never Smoker   • Smokeless tobacco: Never Used   Substance Use Topics   • Alcohol use: Not Currently   • Drug use: Never     Medications Prior to Admission   Medication Sig Dispense Refill Last Dose   • aspirin 81 MG chewable tablet Chew 81 mg Daily.      • clopidogrel (PLAVIX) 75 MG tablet Take 75 mg by mouth Daily.   Taking   • DULoxetine (CYMBALTA) 20 MG capsule Take 20 mg by mouth Daily.      • Empagliflozin-metFORMIN HCl (Synjardy) 5-1000 MG tablet Take  by mouth.      • glipizide (GLUCOTROL) 10 MG tablet Take 10 mg by mouth 2 (Two) Times a Day Before Meals.   Taking   • HYDROcodone-acetaminophen (NORCO) 7.5-325 MG per tablet Take 1 tablet by mouth Every 6 (Six) Hours As Needed for Moderate Pain .      • insulin glargine (LANTUS) 100 UNIT/ML injection Inject 52 Units under the skin into the appropriate area as directed Daily.      • insulin lispro (humaLOG) 100 UNIT/ML injection Inject  under the skin into the appropriate area as directed 3 (Three) Times a Day Before Meals.   Taking   • isosorbide dinitrate (ISORDIL) 10 MG tablet Take 30 mg by mouth Daily.   Taking   • losartan (COZAAR) 50 MG tablet Take 100 mg by mouth Daily.   Taking   • metFORMIN ER (GLUCOPHAGE-XR) 750 MG 24 hr tablet Take 750 mg by mouth 2 (Two) Times a Day.   Taking   • metoprolol succinate XL (TOPROL-XL) 50 MG 24 hr tablet Take 50 mg by mouth 2 (Two) Times a Day.   Taking   • omeprazole (priLOSEC) 40 MG capsule Take 40 mg by mouth Daily.   Taking   • simvastatin (Zocor) 40 MG tablet Take 40 mg by mouth Every Night.        Allergies:  Patient has no known allergies.    Review of Systems  14-point ROS performed and all negative except for pertinent +/-'s detailed in HPI.     Objective     Vital Signs  Temp:  [98.3 °F (36.8 °C)-98.4 °F (36.9 °C)] 98.4 °F (36.9 °C)  Heart Rate:  [64-87] 69  Resp:  [14-18] 16  BP:  "(102122)/(63-77) 106/64    Flowsheet Rows      First Filed Value   Admission Height  162.6 cm (64\") Documented at 05/18/2020 1149   Admission Weight  90.7 kg (200 lb) Documented at 05/18/2020 1149           No intake/output data recorded.  No intake/output data recorded.  No intake or output data in the 24 hours ending 05/18/20 1915    Physical Exam:  NAD; pleasant; oriented; looks stated age  Overweight  MMM; AT/NC   No eye discharge; no scleral icterus  No JVD; no carotid bruits  CTA bilat; not labored  RRR, no rub  Soft, NT, +D, BS+  No edema  No clubbing  Surgical scars left wrist with decreased range of motion  No asterixis  Moves all extremities except left wrist  Mood and affect are normal  Speech is fluent    Results Review:  Results from last 7 days   Lab Units 05/18/20  1400 05/18/20  1136 05/17/20  1048   SODIUM mmol/L 139 137 139   POTASSIUM mmol/L 5.1 4.8 5.0   CHLORIDE mmol/L 103 100 102   CO2 mmol/L 20.7* 23.7 23.4   BUN mg/dL 23 23 25*   CREATININE mg/dL 1.33* 1.36* 1.30*   CALCIUM mg/dL 9.2 9.5 9.7   BILIRUBIN mg/dL  --  0.6 0.5   ALK PHOS U/L  --  83 78   ALT (SGPT) U/L  --  12 11   AST (SGOT) U/L  --  15 13   GLUCOSE mg/dL 120* 164* 182*       Estimated Creatinine Clearance: 52.1 mL/min (A) (by C-G formula based on SCr of 1.33 mg/dL (H)).          Results from last 7 days   Lab Units 05/18/20  1136 05/17/20  1048   WBC 10*3/mm3 12.88* 8.49   HEMOGLOBIN g/dL 18.0* 16.1   PLATELETS 10*3/mm3 170 160       Results from last 7 days   Lab Units 05/18/20  1136   INR  1.03       Active Medications    aspirin 81 mg Oral Daily   atorvastatin 20 mg Oral Daily   clopidogrel 75 mg Oral Daily   DULoxetine 20 mg Oral Daily   enoxaparin 40 mg Subcutaneous Q24H   glipizide 10 mg Oral BID AC   [START ON 5/19/2020] insulin lispro 0-9 Units Subcutaneous TID AC   losartan 100 mg Oral Daily   metoprolol succinate XL 50 mg Oral BID   nitroglycerin 1 inch Topical Q6H   [START ON 5/19/2020] pantoprazole 40 mg Oral QAM "   sodium chloride 10 mL Intravenous Q12H          Assessment/Plan   Assessment  1.  CKD3: Renal function is stable.  Volume status fine.  Electrolytes compensated.  2.  Unstable angina  3.  DM2 for 20 years  4.  Hypertension, over-controlled  5.  Erythrocytosis      Unstable angina (CMS/HCC)    Nausea and vomiting    Chronic renal failure    Type 2 diabetes mellitus with circulatory disorder, with long-term current use of insulin (CMS/HCC)    Polycythemia      Plan  1.  I think he has low and manageable risk for ANCELMO, and so no renal objection to proceeding with cardiac catheterization tomorrow  2.  Discontinue losartan with imminent dye exposure and given modest hypotension  3.  Will begin low-rate IVF tonight to expand volume in preparation for contrast load  4.  Urinalysis and Uprot:cr    I discussed the patient's findings and my recommendations with patient    Rober Person MD  05/18/20  19:15

## 2020-05-18 NOTE — H&P
Patient Name: Jett Flower  :1950  70 y.o.    Date of Admission: 2020  Date of Consultation:  20  Encounter Provider: Augusto Gloria III, MD  Place of Service: [facility] CARDIOLOGY  Referring Provider: Augusto Gloria III, MD  Patient Care Team:  Devon Monroe DO as PCP - General (Family Medicine)  Genevieve Jacobs, RN as Ambulatory  ([facility])      Chief complaint: Chest pain     History of Present Illness:   Jett Flower is a 70 year old pt with a history of , HLD, DMHTN, CHF and 2 cardiac stents placed. Pt had cardiologist in [place]. Pt was compliant with Plavix at home.  He denies having had a prior myocardial infarction.  He states he had a stent placed in , and then another one more recently although he does not remember the year.  He does not know where and his heart the stents were placed.  He states is much is he is aware he has normal heart function.  He does not have a cardiologist here.    He also has a history of previously severe renal insufficiency.  He states that at one point they were telling him that he would almost certainly need to go on dialysis.  However his kidney function improved.  He states the nephrologist in [place] told him that if at all possible he should never ever undergo any procedure that required IV contrast.    Patient presented the emergency room on 517 at [facility] with chest discomfort.  He was instructed follow-up as an outpatient with cardiology.  However he is been having recurrent episodes of mid precordial chest heaviness and fullness and has been taken nitroglycerin at home.  He came back to the emergency room today and was admitted.    He is also been having a lot of problems with nausea but no vomiting.  He has been have some abdominal distention.  Hemoglobin was noted to be elevated when he was in [place] and that is also noted to be elevated here.  He has not had any  shortness of breath.  No chills or fevers.  When he first presented EKG was felt to show possible acute myocardial infarction so they sent a COVID test because they thought they might have to send him emergently to the Cath Lab.  COVID testing came back negative.  Comparison of his EKG with his prior EKG was then felt to indicate no change and troponin was normal.    Currently patient states he is pain-free.  No shortness of breath.      Past Medical History:   Diagnosis Date   • Congestive cardiac failure (CMS/HCC)    • Diabetes mellitus (CMS/HCC)    • Heart disease    • Hyperlipidemia    • Hypertension        Past Surgical History:   Procedure Laterality Date   • CARDIAC SURGERY      x2   • CERVICAL SPINE SURGERY           Prior to Admission medications    Medication Sig Start Date End Date Taking? Authorizing Provider   butalbital-acetaminophen-caffeine (FIORICET, ESGIC) -40 MG per tablet 2 tablets by mouth every 8 hours as needed for headache 1/20/20   Anil Watson MD   clopidogrel (PLAVIX) 75 MG tablet Take 75 mg by mouth Daily.    Nurse Osman Rojas RN   glipizide (GLUCOTROL) 10 MG tablet Take 10 mg by mouth 2 (Two) Times a Day Before Meals.    Emergency, Nurse Epic, RN   insulin lispro (humaLOG) 100 UNIT/ML injection Inject  under the skin into the appropriate area as directed 3 (Three) Times a Day Before Meals.    Nurse Osman Rojas RN   isosorbide dinitrate (ISORDIL) 10 MG tablet Take 10 mg by mouth 3 (Three) Times a Day.    Nurse Osman Rojas RN   losartan (COZAAR) 50 MG tablet Take 100 mg by mouth Daily.    Nurse Osman Rojas RN   metFORMIN ER (GLUCOPHAGE-XR) 750 MG 24 hr tablet Take 750 mg by mouth Daily With Breakfast.    Nurse Osman Rojas RN   metoprolol succinate XL (TOPROL-XL) 50 MG 24 hr tablet Take 50 mg by mouth Daily.    Nurse Osman Rojas RN   omeprazole (priLOSEC) 40 MG capsule Take 40 mg by mouth Daily.    Nurse Osman Rojas RN       No Known  "Allergies    Social History     Socioeconomic History   • Marital status:      Spouse name: Not on file   • Number of children: Not on file   • Years of education: Not on file   • Highest education level: Not on file   Tobacco Use   • Smoking status: Never Smoker   • Smokeless tobacco: Never Used   Substance and Sexual Activity   • Alcohol use: Not Currently   • Drug use: Never   • Sexual activity: Defer       Family History   Problem Relation Age of Onset   • Heart attack Mother    • Emphysema Father    • Heart attack Father    • Stroke Father    • Arthritis Father    • Arthritis Sister    • Sudden death Brother         shot   • No Known Problems Daughter    • No Known Problems Son    • No Known Problems Maternal Grandmother    • No Known Problems Maternal Grandfather    • Emphysema Paternal Grandmother    • Diabetes Paternal Grandfather    • No Known Problems Sister    • No Known Problems Sister    • No Known Problems Sister    • No Known Problems Sister    • No Known Problems Sister    • No Known Problems Daughter    • No Known Problems Daughter        REVIEW OF SYSTEMS:   All systems reviewed.  Pertinent positives identified in HPI.  All other systems are negative.      Objective:     Vitals:    05/18/20 1330 05/18/20 1357 05/18/20 1530 05/18/20 1624   BP: 104/71 114/71 102/69    BP Location:  Right arm Right arm    Patient Position:  Lying Lying    Pulse: 72 64 69 74   Resp:  16 14 16   Temp:   98.4 °F (36.9 °C)    TempSrc:   Oral    SpO2: 97% 96% 95% 96%   Weight:    89.5 kg (197 lb 6.4 oz)   Height:    162.6 cm (64\")     Body mass index is 33.88 kg/m².    Physical Exam:  General Appearance:    Alert, cooperative, in no acute distress   Head:    Normocephalic, without obvious abnormality, atraumatic   Eyes:            Lids and lashes normal, conjunctivae and sclerae normal, no   icterus, no pallor, corneas clear, PERRLA   Ears:    Ears appear intact with no abnormalities noted   Throat:   No oral " lesions, no thrush, oral mucosa moist   Neck:   No adenopathy, supple, trachea midline, no thyromegaly, no   carotid bruit, no JVD   Back:     No kyphosis present, no scoliosis present, no skin lesions, erythema or scars, no tenderness to percussion or palpation, range of motion normal   Lungs:     Clear to auscultation,respirations regular, even and unlabored    Heart:    Regular rhythm and normal rate, normal S1 and S2, no murmur, no gallop, no rub, no click   Chest Wall:    No abnormalities observed   Abdomen:     Normal bowel sounds, no masses, no organomegaly, soft        non-tender, non-distended, no guarding, no rebound  tenderness   Extremities:   Moves all extremities well, no edema, no cyanosis, no redness   Pulses:   Pulses palpable and equal bilaterally. Normal radial, carotid, femoral, dorsalis pedis and posterior tibial pulses bilaterally. Normal abdominal aorta   Skin:  Psychiatric:   No bleeding, bruising or rash    Alert and oriented x 3, normal mood and affect         Lab Review:     Results from last 7 days   Lab Units 05/18/20  1136   SODIUM mmol/L 137   POTASSIUM mmol/L 4.8   CHLORIDE mmol/L 100   CO2 mmol/L 23.7   BUN mg/dL 23   CREATININE mg/dL 1.36*   CALCIUM mg/dL 9.5   BILIRUBIN mg/dL 0.6   ALK PHOS U/L 83   ALT (SGPT) U/L 12   AST (SGOT) U/L 15   GLUCOSE mg/dL 164*     Results from last 7 days   Lab Units 05/18/20  1400 05/18/20  1136 05/17/20  1048   TROPONIN T ng/mL <0.010 <0.010 <0.010     Results from last 7 days   Lab Units 05/18/20  1136   WBC 10*3/mm3 12.88*   HEMOGLOBIN g/dL 18.0*   HEMATOCRIT % 52.4*   PLATELETS 10*3/mm3 170     Results from last 7 days   Lab Units 05/18/20  1136   INR  1.03                                   I personally viewed and interpreted the patient's EKG/Telemetry data.      Current Facility-Administered Medications:   •  aspirin chewable tablet 81 mg, 81 mg, Oral, Daily, Augusto Gloria III, MD  •  atorvastatin (LIPITOR) tablet 20 mg, 20 mg, Oral, Daily,  Augusto Gloria III, MD  •  clopidogrel (PLAVIX) tablet 75 mg, 75 mg, Oral, Daily, Augusto Gloria III, MD  •  DULoxetine (CYMBALTA) DR capsule 20 mg, 20 mg, Oral, Daily, Augusto Gloria III, MD  •  enoxaparin (LOVENOX) syringe 40 mg, 40 mg, Subcutaneous, Q24H, Augusto Gloria III, MD  •  glipizide (GLUCOTROL) tablet 10 mg, 10 mg, Oral, BID AC, Augusto Gloria III, MD  •  HYDROcodone-acetaminophen (NORCO) 7.5-325 MG per tablet 1 tablet, 1 tablet, Oral, Q6H PRN, Augusto Gloria III, MD  •  losartan (COZAAR) tablet 100 mg, 100 mg, Oral, Daily, Augusto Gloria III, MD  •  metoprolol succinate XL (TOPROL-XL) 24 hr tablet 50 mg, 50 mg, Oral, BID, Augusto Gloria III, MD  •  nitroglycerin (NITROSTAT) ointment 1 inch, 1 inch, Topical, Q6H, Augusto Gloria III, MD  •  ondansetron (ZOFRAN) injection 4 mg, 4 mg, Intravenous, Q6H PRN, Augusto Gloria III, MD  •  [START ON 5/19/2020] pantoprazole (PROTONIX) EC tablet 40 mg, 40 mg, Oral, QAM, Augusto Gloria III, MD  •  sodium chloride 0.9 % flush 10 mL, 10 mL, Intravenous, PRN, Augusto Gloria III, MD  •  sodium chloride 0.9 % flush 10 mL, 10 mL, Intravenous, Q12H, Augusto Gloria III, MD  •  sodium chloride 0.9 % flush 10 mL, 10 mL, Intravenous, PRN, Augusto Gloria III, MD    Assessment and Plan:       Active Hospital Problems    Diagnosis  POA   • **Unstable angina (CMS/HCC) [I20.0]  Yes   • Chronic renal failure [N18.9]  Yes   • Type 2 diabetes mellitus with circulatory disorder, with long-term current use of insulin (CMS/HCC) [E11.59, Z79.4]  Not Applicable   • Nausea and vomiting [R11.2]  Yes      Resolved Hospital Problems   No resolved problems to display.     1.  Unstable angina- patient is currently pain-free.  We will continue oral and parenteral anti-ischemic therapy.  Patient will ultimately require a cardiac catheterization.  2.?  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  We will obtain an  echocardiogram  3.  Chronic renal failure- patient's creatinine is mildly increased, but he states that in the past he was close to requiring dialysis and he had previously been instructed to never undergo any diagnostic procedure that required IV contrast.  We will consult nephrology  4.  Diabetes mellitus with circulatory complication-we will consult A to manage his diabetes while hospitalized  5.  Elevated hemoglobin/hematocrit-he denies having any known history of this.  We will consult A    I will make him n.p.o. after midnight for the potential of a cardiac catheterization tomorrow, pending input from hospital medicine and nephrology.    Augusto Gloria III, MD  05/18/20  16:45

## 2020-05-18 NOTE — PLAN OF CARE
Problem: Patient Care Overview  Goal: Plan of Care Review  Outcome: Ongoing (interventions implemented as appropriate)  Flowsheets (Taken 5/18/2020 1683)  Progress: no change  Plan of Care Reviewed With: patient  Outcome Summary: Denies chest pain. VSS. Nephrology and LHA consulted. Possible CATH in am if cleared by renal. Oriented to unit. Echo completed.

## 2020-05-18 NOTE — ED NOTES
Mask placed on patient in triage.  Triage RN wearing mask throughout encounter.    Pt states he has been having chest pain for a couple months off and on.  States this episode started yesterday.      Ravi Jesus, RN  05/18/20 1118

## 2020-05-18 NOTE — ED PROVIDER NOTES
EMERGENCY DEPARTMENT ENCOUNTER    Room Number:  41/41  Date of encounter:  5/18/2020  PCP: Devon Monroe DO  Historian: Patient      HPI:  Chief Complaint: Chest pain  A complete HPI/ROS/PMH/PSH/SH/FH are unobtainable due to: Not applicable  Context: Jett Flower is a 70 y.o. male who presents to the ED c/o chest pain that is been occurring off and on for 2 months.  Patient describes it as a pressure at times, dull ache at times, and even sometimes sharp at times.  It is usually of pressure and a dull ache.  It will come and go and sometimes a couple times or a few times a day.  It is been incurred increasing in frequency.  Yesterday he had an episode where it lasted for hours which was his longest duration.  He feels that it is better when he takes it easy and rest.  But he is not aware of any aggravating symptoms.  It can occur with exertion or can occur without exertion.  He was seen at [facility] emergency department yesterday for the same thing.  They stated that he needed to follow-up with a cardiologist.  Patient has no cardiologist to follow-up with.  Today he took nitroglycerin 3 times because of pain that started at 730 this morning.  The pain lasted until approximately 1115 today.  This was 1 of the longest duration of occurrence.  He has some shortness of breath associated with the pain.  Some nausea at times.  Patient does have episodic nausea that is chronic.  Patient does have a history of congestive heart failure and a history of 2 cardiac stents.  His cardiologist was in [place].  He reports a 95% occlusion 1 of his vessels.  He was having chest pain prior to those stents and that pain was similar but worse than this current pain that he is having.  He has been compliant with Plavix.  He has not taken any aspirin today.  Currently he is pain-free.  Currently he is asymptomatic        Previous Episodes: Yes  Current Symptoms: Chest pain that is resolved    MEDICAL HISTORY REVIEWED    No  old cardiology test other than EKG from yesterday in [facility].  Patient saw Dr. Ackerman yesterday.  Patient had lab work consisting of a troponin, CMP, and CBC in the emergency department yesterday and revealed no acute emergent process, specifically a normal troponin.  Chest x-ray revealed borderline cardiomegaly with no acute process seen    PAST MEDICAL HISTORY  Active Ambulatory Problems     Diagnosis Date Noted   • Nausea and vomiting 05/06/2020   • Rectal bleeding 05/06/2020     Resolved Ambulatory Problems     Diagnosis Date Noted   • No Resolved Ambulatory Problems     Past Medical History:   Diagnosis Date   • Congestive cardiac failure (CMS/HCC)    • Diabetes mellitus (CMS/HCC)    • Heart disease    • Hyperlipidemia    • Hypertension          PAST SURGICAL HISTORY  Past Surgical History:   Procedure Laterality Date   • CARDIAC SURGERY      x2   • CERVICAL SPINE SURGERY           FAMILY HISTORY  Family History   Problem Relation Age of Onset   • Heart attack Mother    • Emphysema Father    • Heart attack Father    • Stroke Father    • Arthritis Father    • Arthritis Sister    • Sudden death Brother         shot   • No Known Problems Daughter    • No Known Problems Son    • No Known Problems Maternal Grandmother    • No Known Problems Maternal Grandfather    • Emphysema Paternal Grandmother    • Diabetes Paternal Grandfather    • No Known Problems Sister    • No Known Problems Sister    • No Known Problems Sister    • No Known Problems Sister    • No Known Problems Sister    • No Known Problems Daughter    • No Known Problems Daughter          SOCIAL HISTORY  Social History     Socioeconomic History   • Marital status:      Spouse name: Not on file   • Number of children: Not on file   • Years of education: Not on file   • Highest education level: Not on file   Tobacco Use   • Smoking status: Never Smoker   • Smokeless tobacco: Never Used   Substance and Sexual Activity   • Alcohol use: Not Currently   •  Drug use: Never   • Sexual activity: Defer         ALLERGIES  Patient has no known allergies.        REVIEW OF SYSTEMS  Review of Systems     All systems reviewed and negative except for those discussed in HPI.       PHYSICAL EXAM    I have reviewed the triage vital signs and nursing notes.    ED Triage Vitals [05/18/20 1115]   Temp Heart Rate Resp BP SpO2   98.3 °F (36.8 °C) 87 18 -- 93 %      Temp src Heart Rate Source Patient Position BP Location FiO2 (%)   -- -- -- -- --       GENERAL: Male that appears chronically ill no acute distress.Vital signs on my initial evaluation patient's O2 sat on room air on my evaluation is 98%.  HENT: nares patent  Head/neck/ face are symmetric without gross deformity, signs of trauma, or swelling  EYES: no scleral icterus, no conjunctival pallor.  NECK: Supple, no meningismus  CV: regular rhythm, regular rate with intact distal pulses.  RESPIRATORY: normal effort and no respiratory distress.  Lungs are clear to auscultatio  ABDOMEN: soft and non-tender.  Morbidly obese  MUSCULOSKELETAL: no deformity.  1+ edema to lower extremities bilaterally  NEURO: alert and appropriate, moves all extremities, follows commands.  No focal weakness  SKIN: warm, dry    Vital signs and nursing notes reviewed.  EKG reading  EKG was done at 11:19 AM  Rate of 76  Narrow complex  Normal axis  Some diffuse nonspecific ST and T wave changes has some mild J-point elevation in the inferior leads II, III and aVF that is less than 1 mm.  I do not see any reciprocal changes.  He has some Q waves in inferior leads as well.  He does have some low voltage in precordial leads.  That very well could be related to his body habitus.  His QT is unremarkable  I compared to an EKG from yesterday May 17, 2020 and it looks fairly similar to this EKG.      LAB RESULTS  Recent Results (from the past 24 hour(s))   Comprehensive Metabolic Panel    Collection Time: 05/18/20 11:36 AM   Result Value Ref Range    Glucose 164 (H)  65 - 99 mg/dL    BUN 23 8 - 23 mg/dL    Creatinine 1.36 (H) 0.76 - 1.27 mg/dL    Sodium 137 136 - 145 mmol/L    Potassium 4.8 3.5 - 5.2 mmol/L    Chloride 100 98 - 107 mmol/L    CO2 23.7 22.0 - 29.0 mmol/L    Calcium 9.5 8.6 - 10.5 mg/dL    Total Protein 7.5 6.0 - 8.5 g/dL    Albumin 4.40 3.50 - 5.20 g/dL    ALT (SGPT) 12 1 - 41 U/L    AST (SGOT) 15 1 - 40 U/L    Alkaline Phosphatase 83 39 - 117 U/L    Total Bilirubin 0.6 0.2 - 1.2 mg/dL    eGFR Non African Amer 52 (L) >60 mL/min/1.73    Globulin 3.1 gm/dL    A/G Ratio 1.4 g/dL    BUN/Creatinine Ratio 16.9 7.0 - 25.0    Anion Gap 13.3 5.0 - 15.0 mmol/L   Troponin    Collection Time: 05/18/20 11:36 AM   Result Value Ref Range    Troponin T <0.010 0.000 - 0.030 ng/mL   Protime-INR    Collection Time: 05/18/20 11:36 AM   Result Value Ref Range    Protime 13.2 11.7 - 14.2 Seconds    INR 1.03 0.90 - 1.10   Light Blue Top    Collection Time: 05/18/20 11:36 AM   Result Value Ref Range    Extra Tube hold for add-on    Green Top (Gel)    Collection Time: 05/18/20 11:36 AM   Result Value Ref Range    Extra Tube Hold for add-ons.    Lavender Top    Collection Time: 05/18/20 11:36 AM   Result Value Ref Range    Extra Tube hold for add-on    Gold Top - SST    Collection Time: 05/18/20 11:36 AM   Result Value Ref Range    Extra Tube Hold for add-ons.    CBC Auto Differential    Collection Time: 05/18/20 11:36 AM   Result Value Ref Range    WBC 12.88 (H) 3.40 - 10.80 10*3/mm3    RBC 5.89 (H) 4.14 - 5.80 10*6/mm3    Hemoglobin 18.0 (H) 13.0 - 17.7 g/dL    Hematocrit 52.4 (H) 37.5 - 51.0 %    MCV 89.0 79.0 - 97.0 fL    MCH 30.6 26.6 - 33.0 pg    MCHC 34.4 31.5 - 35.7 g/dL    RDW 13.2 12.3 - 15.4 %    RDW-SD 42.1 37.0 - 54.0 fl    MPV 10.3 6.0 - 12.0 fL    Platelets 170 140 - 450 10*3/mm3    Neutrophil % 60.5 42.7 - 76.0 %    Lymphocyte % 20.0 19.6 - 45.3 %    Monocyte % 7.1 5.0 - 12.0 %    Eosinophil % 11.3 (H) 0.3 - 6.2 %    Basophil % 0.5 0.0 - 1.5 %    Immature Grans % 0.6 (H)  0.0 - 0.5 %    Neutrophils, Absolute 7.81 (H) 1.70 - 7.00 10*3/mm3    Lymphocytes, Absolute 2.57 0.70 - 3.10 10*3/mm3    Monocytes, Absolute 0.91 (H) 0.10 - 0.90 10*3/mm3    Eosinophils, Absolute 1.45 (H) 0.00 - 0.40 10*3/mm3    Basophils, Absolute 0.06 0.00 - 0.20 10*3/mm3    Immature Grans, Absolute 0.08 (H) 0.00 - 0.05 10*3/mm3    nRBC 0.0 0.0 - 0.2 /100 WBC       Ordered the above labs and independently reviewed the results.        RADIOLOGY  Xr Chest 1 View    Result Date: 5/18/2020  XR CHEST 1 VW-  HISTORY: Male who is 70 years-old,  chest pain  TECHNIQUE: Frontal view of the chest  COMPARISON: None available  FINDINGS: Heart, mediastinum and pulmonary vasculature are unremarkable. Indeterminate somewhat nodular densities are seen bilaterally, could for example represent calcified pleural plaques, nonspecific nodules, superimposed osseous pathology, comparison with prior imaging is suggested to be obtained, CT correlation could be obtained as indicated No focal pulmonary consolidation, pleural effusion, or pneumothorax. No acute osseous process.      No focal pulmonary consolidation. Indeterminate somewhat nodular densities bilaterally, as described.  This report was finalized on 5/18/2020 12:23 PM by Dr. Be Tolbert M.D.        I ordered the above noted radiological studies. Reviewed by me and discussed with radiologist.  See dictation for official radiology interpretation.      PROCEDURES    Procedures      MEDICATIONS GIVEN IN ER    Medications   sodium chloride 0.9 % flush 10 mL (has no administration in time range)   aspirin tablet 325 mg (325 mg Oral Given 5/18/20 1139)   nitroglycerin (NITROSTAT) ointment 1 inch (1 inch Topical Given 5/18/20 1141)   ondansetron (ZOFRAN) injection 4 mg (4 mg Intravenous Given 5/18/20 1412)         PROGRESS, DATA ANALYSIS, CONSULTS, AND MEDICAL DECISION MAKING    We are currently under a pandemic from the COVID19 infection.  The patient presented to the emergency  department by ambulance or personal vehicle.  During current hospital restrictions no other visitors were present in the emergency department during my evaluation and treatment. I followed the current protocols required by Infection Control at [facility] in my evaluation and treatment of the patient. The patient was wearing a face mask during my evaluation and throughout my encounter. During my whole encounter with this patient I used appropriate personal protective equipment.  This equipment consisted of eye protection, facemask, gown, and gloves.  I applied this equipment before entering the room.      All labs have been independently reviewed by me.  All radiology studies have been reviewed by me and discussed with radiologist dictating the report.   EKG's independently viewed and interpreted by me.  Discussion below represents my analysis of pertinent findings related to patient's condition, differential diagnosis, treatment plan and final disposition.      ED Course as of May 18 1429   Mon May 18, 2020   1216 I spoke with the cardiologist, Dr. Augusto Gloira, informed her of the EKG in which he looked at as well.  I do not believe that the EKG is changed from yesterday.  He does have some inferior changes seen on that EKG.  His troponin was negative yesterday and currently his troponin is pending at this time.  Patient currently is pain-free and has normal vital signs right now with an O2 sat of 97% on room air with a normal heart rate and normal blood pressure.  I informed Dr. Gloria of this.  He will admit the patient I will call him back if the troponin is positive otherwise admit him upstairs to his service.  We are going to order a COVID because this patient will likely need a cardiac cath.  At this time it does not appear that this cardiac cath needs to be done emergently.  But it probably will be done in his evaluation work-up for his chest pain.    [MM]   1419 Hemoglobin(!): 18.0 [MM]   1419  Hemoglobin yesterday was 16.  Patient very likely has sleep apnea.   Hemoglobin(!): 18.0 [MM]   1419 Chest x-ray is unremarkable.    [MM]   1419 Troponin T: <0.010 [MM]   1422 I have seen and reevaluated this patient.  Patient has remained chest pain-free here in the emergency department.  Patient is on nitroglycerin 1 inch paste and is received an aspirin here in the emergency department.  His blood pressure is in the low 100s with a normal heart rate and O2 sat is 97% on room air.  Patient appears very comfortable and in no distress.  I informed the results of the tests initial treatment plan.  He believes this is his heart and that is what I am concerned with as well.    [MM]      ED Course User Index  [MM] Devon Cardoso MD       AS OF 14:29 VITALS:    BP - 114/71  HR - 64  TEMP - 98.3 °F (36.8 °C)  02 SATS - 96%        DIAGNOSIS  Final diagnoses:   Unstable angina (CMS/[facility])   Abnormal EKG         DISPOSITION  Monitor admit           Devon Cardoso MD  05/18/20 1425       Devon Cardoso MD  05/18/20 1421

## 2020-05-18 NOTE — CONSULTS
Internal Medicine Consult  INTERNAL MEDICINE   [facility]       Patient Identification:  Name: Jett Flower  Age: 70 y.o.  Sex: male  :  1950  MRN: [id number]                   Primary Care Physician: Devon Monroe DO                               Requesting physician: Dr. Augusto Gloria  Date of consultation: 2020    Chief Complaint: Episodic chest pain for the last couple of months worse yesterday and then today during ER visit and subsequent admission to cardiology service.    History of Present Illness:   Patient is a 70-year-old male with a retired  moved from [place] in October of last year to [place].  He has complicated past medical history consisting of coronary artery disease, diabetes mellitus, history of congestive heart failure and chronic back pain with surgery.  Patient has not established care with cardiology since his arrival to [place].  According to him he has done well since his move until couple of months ago when he was having episodic discomfort involving the middle of her chest going to the right side of the chest and shoulder with episodes of discomfort lasting anywhere from 10 minutes to an hour and usually responding to nitroglycerin.  This pattern of discomfort changed yesterday when it affected the center of her chest and going to the left side and was much more prolonged than what he has been experiencing in the preceding months and did not respond as well to nitroglycerin but eventually resolved.  This morning he developed another episode of discomfort which did not improve despite taking nitroglycerin resulting in him coming to the emergency room.  Patient has been to cardiology service because initially it was thought that his EKG changes were concerning for acute coronary process which may require him to go to Cath Lab.  Upon further review of his previous EKG it was concluded that these changes are not acute and patient was admitted to  telemetry unit for further care.  He had a COVID-19 assay which came back negative.  Patient has history of diabetes and claims to be compliant.  Despite review of his health care records from [place] I could not find his hemoglobin A1c pattern and according to him his last hemoglobin A1c which was checked about 7 months ago was around 8-8.5.  Patient also has history of renal insufficiency.  Internal medicine service was consulted to manage his other medical issues while cardiology service who are the primary admitting service is evaluating him for his chest pain and changes in his cardiac status.  Patient is currently feeling comfortable.      Past Medical History:  Past Medical History:   Diagnosis Date   • Congestive cardiac failure (CMS/HCC)    • Diabetes mellitus (CMS/HCC)    • Heart disease    • Hyperlipidemia    • Hypertension      Past Surgical History:  Past Surgical History:   Procedure Laterality Date   • CARDIAC SURGERY      x2   • CERVICAL SPINE SURGERY        Home Meds:  Medications Prior to Admission   Medication Sig Dispense Refill Last Dose   • aspirin 81 MG chewable tablet Chew 81 mg Daily.      • clopidogrel (PLAVIX) 75 MG tablet Take 75 mg by mouth Daily.   Taking   • DULoxetine (CYMBALTA) 20 MG capsule Take 20 mg by mouth Daily.      • Empagliflozin-metFORMIN HCl (Synjardy) 5-1000 MG tablet Take  by mouth.      • glipizide (GLUCOTROL) 10 MG tablet Take 10 mg by mouth 2 (Two) Times a Day Before Meals.   Taking   • HYDROcodone-acetaminophen (NORCO) 7.5-325 MG per tablet Take 1 tablet by mouth Every 6 (Six) Hours As Needed for Moderate Pain .      • insulin glargine (LANTUS) 100 UNIT/ML injection Inject 52 Units under the skin into the appropriate area as directed Daily.      • insulin lispro (humaLOG) 100 UNIT/ML injection Inject  under the skin into the appropriate area as directed 3 (Three) Times a Day Before Meals.   Taking   • isosorbide dinitrate (ISORDIL) 10 MG tablet Take 30 mg by mouth  Daily.   Taking   • losartan (COZAAR) 50 MG tablet Take 100 mg by mouth Daily.   Taking   • metFORMIN ER (GLUCOPHAGE-XR) 750 MG 24 hr tablet Take 750 mg by mouth 2 (Two) Times a Day.   Taking   • metoprolol succinate XL (TOPROL-XL) 50 MG 24 hr tablet Take 50 mg by mouth 2 (Two) Times a Day.   Taking   • omeprazole (priLOSEC) 40 MG capsule Take 40 mg by mouth Daily.   Taking   • simvastatin (Zocor) 40 MG tablet Take 40 mg by mouth Every Night.        Current Meds:     Current Facility-Administered Medications:   •  aspirin chewable tablet 81 mg, 81 mg, Oral, Daily, Augusto Gloria III, MD  •  atorvastatin (LIPITOR) tablet 20 mg, 20 mg, Oral, Daily, Augusto Gloria III, MD  •  clopidogrel (PLAVIX) tablet 75 mg, 75 mg, Oral, Daily, Augusto Gloria III, MD  •  DULoxetine (CYMBALTA) DR capsule 20 mg, 20 mg, Oral, Daily, Augusto Gloria III, MD  •  enoxaparin (LOVENOX) syringe 40 mg, 40 mg, Subcutaneous, Q24H, Augusto Gloria III, MD  •  glipizide (GLUCOTROL) tablet 10 mg, 10 mg, Oral, BID AC, Augusto Gloria III, MD  •  HYDROcodone-acetaminophen (NORCO) 7.5-325 MG per tablet 1 tablet, 1 tablet, Oral, Q6H PRN, Augusto Gloria III, MD  •  losartan (COZAAR) tablet 100 mg, 100 mg, Oral, Daily, Augusto Gloria III, MD  •  metoprolol succinate XL (TOPROL-XL) 24 hr tablet 50 mg, 50 mg, Oral, BID, Augusto Gloria III, MD  •  nitroglycerin (NITROSTAT) ointment 1 inch, 1 inch, Topical, Q6H, Augusto Gloria III, MD  •  ondansetron (ZOFRAN) injection 4 mg, 4 mg, Intravenous, Q6H PRN, Augusto Gloria III, MD  •  [START ON 5/19/2020] pantoprazole (PROTONIX) EC tablet 40 mg, 40 mg, Oral, QAM, Augusto Gloria III, MD  •  sodium chloride 0.9 % flush 10 mL, 10 mL, Intravenous, PRN, Augusto Gloria III, MD  •  sodium chloride 0.9 % flush 10 mL, 10 mL, Intravenous, Q12H, Augusto Gloria III, MD  •  sodium chloride 0.9 % flush 10 mL, 10 mL, Intravenous, PRN, Augusto Gloria III, MD  Allergies:  No Known Allergies  Social History:   Social History  "    Tobacco Use   • Smoking status: Never Smoker   • Smokeless tobacco: Never Used   Substance Use Topics   • Alcohol use: Not Currently      Family History:  Family History   Problem Relation Age of Onset   • Heart attack Mother    • Emphysema Father    • Heart attack Father    • Stroke Father    • Arthritis Father    • Arthritis Sister    • Sudden death Brother         shot   • No Known Problems Daughter    • No Known Problems Son    • No Known Problems Maternal Grandmother    • No Known Problems Maternal Grandfather    • Emphysema Paternal Grandmother    • Diabetes Paternal Grandfather    • No Known Problems Sister    • No Known Problems Sister    • No Known Problems Sister    • No Known Problems Sister    • No Known Problems Sister    • No Known Problems Daughter    • No Known Problems Daughter           Review of Systems  See history of present illness and past medical history.    Constitutional: Remarkable for no fever or chills  Cardiovascular: Remarkable for what has been described in history of present illness  Respiratory: Remarkable for no cough or congestion  GI: Remarkable for no changes in appetite no weight loss or weight gain  : Remarkable for no burning in urination frequency urgency  Musculoskeletal: Remarkable for no specific joint aches and pain  Neurological: Remarkable for no loss of consciousness or continence.    Vitals:   /69 (BP Location: Right arm, Patient Position: Lying)   Pulse 74   Temp 98.4 °F (36.9 °C) (Oral)   Resp 16   Ht 162.6 cm (64\")   Wt 89.5 kg (197 lb 6.4 oz)   SpO2 96%   BMI 33.88 kg/m²   I/O: No intake or output data in the 24 hours ending 05/18/20 1900  Exam:  Patient is examined using the personal protective equipment as per guidelines from infection control for this particular patient as enacted.  Hand washing was performed before and after patient interaction.  General Appearance:    Alert, cooperative, no distress, appears stated age   Head:    " Normocephalic, without obvious abnormality, atraumatic   Eyes:    PERRL, conjunctiva/corneas clear, EOM's intact, both eyes   Ears:    Normal external ear canals, both ears   Nose:   Nares normal, septum midline, mucosa normal, no drainage    or sinus tenderness   Throat:   Lips, tongue, gums normal; oral mucosa pink and moist   Neck:   Supple, symmetrical, trachea midline, no adenopathy;     thyroid:  no enlargement/tenderness/nodules; no carotid    bruit or JVD   Back:     Symmetric, no curvature, ROM normal, no CVA tenderness   Lungs:     Clear to auscultation bilaterally, respirations unlabored   Chest Wall:    No tenderness or deformity    Heart:    Regular rate and rhythm, S1 and S2 normal, no murmur, rub   or gallop   Abdomen:     Soft, non-tender, bowel sounds active all four quadrants,     no masses, no hepatomegaly, no splenomegaly   Extremities:   Extremities normal, atraumatic, no cyanosis or edema   Pulses:   Pulses palpable in all extremities; symmetric all extremities   Skin:   Skin color normal, Skin is warm and dry,  no rashes or palpable lesions   Neurologic:  Grossly nonfocal       Data Review:      I reviewed the patient's new clinical results.  Results from last 7 days   Lab Units 05/18/20  1136 05/17/20  1048   WBC 10*3/mm3 12.88* 8.49   HEMOGLOBIN g/dL 18.0* 16.1   PLATELETS 10*3/mm3 170 160     Results from last 7 days   Lab Units 05/18/20  1400 05/18/20  1136 05/17/20  1048   SODIUM mmol/L 139 137 139   POTASSIUM mmol/L 5.1 4.8 5.0   CHLORIDE mmol/L 103 100 102   CO2 mmol/L 20.7* 23.7 23.4   BUN mg/dL 23 23 25*   CREATININE mg/dL 1.33* 1.36* 1.30*   CALCIUM mg/dL 9.2 9.5 9.7   GLUCOSE mg/dL 120* 164* 182*     ECG 12 Lead   Final Result   HEART RATE= 76  bpm   RR Interval= 792  ms   MO Interval= 198  ms   P Horizontal Axis= 8  deg   P Front Axis= -3  deg   QRSD Interval= 85  ms   QT Interval= 358  ms   QRS Axis= 44  deg   T Wave Axis= 53  deg   - OTHERWISE NORMAL ECG -   Sinus rhythm   Low  voltage, precordial leads   NON-SPECIFIC ST-T WAVE CHANGES More prominent and inferior leads versus    previous ECG   Electronically Signed By: Yaw Car ([facility]) 18-May-2020 14:19:08   Date and Time of Study: 2020-05-18 11:19:15        Xr Chest 1 View    Result Date: 5/18/2020  No focal pulmonary consolidation. Indeterminate somewhat nodular densities bilaterally, as described.  This report was finalized on 5/18/2020 12:23 PM by Dr. Be Tolbert M.D.      Xr Chest 1 View    Result Date: 5/17/2020  1. Borderline cardiac size with shallow lung expansion and old healed granulomatous disease. Signer Name: Richard Patel MD  Signed: 5/17/2020 11:56 AM  Workstation Name: [email or web address]      Assessment:  Active Hospital Problems    Diagnosis POA   • **Unstable angina (CMS/HCC) [I20.0] Yes   • Chronic renal failure [N18.9] Yes   • Type 2 diabetes mellitus with circulatory disorder, with long-term current use of insulin (CMS/HCC) [E11.59, Z79.4] Not Applicable   • Polycythemia [D75.1] Unknown   • Nausea and vomiting [R11.2] Yes     Added automatically from request for surgery [id number]         Medical decision making:  Chest pain in the context of known coronary artery disease-management per oncology service.  Type 2 diabetes with questionable compliance and glycemic control with reported hemoglobin A1c greater than 8.  Plan is to continue his current regimen including Glucotrol and insulin and provide him with Accu-Cheks and sliding scale coverage and monitor him closely for hypoglycemia.  Check hemoglobin A1c and based on his glucose pattern and in consideration of his renal insufficiency further simplify and fine-tuning his diabetic regimen.  Provide him with diabetic education.  Chronic kidney disease with stable creatinine-avoid nephrotoxic agent and hypotensive episodes.  Recent rise in his hemoglobin-is unclear if it is a manifestation of primary marrow disorder and he does  have associated leukocytosis or acute hypoxia related compensatory/reactive polycythemia.  Plan is to monitor his hemoglobin pattern provide him with oxygen supplementation and consider hematology evaluation if no clear explanation of elevated hemoglobin is found.  History of anxiety and depression-continue his current regimen.    Chloe Garces MD   5/18/2020  19:00  Much of this encounter note is an electronic transcription/translation of spoken language to printed text. The electronic translation of spoken language may permit erroneous, or at times, nonsensical words or phrases to be inadvertently transcribed; Although I have reviewed the note for such errors, some may still exist

## 2020-05-19 ENCOUNTER — TELEPHONE (OUTPATIENT)
Dept: SURGERY | Facility: CLINIC | Age: 70
End: 2020-05-19

## 2020-05-19 ENCOUNTER — APPOINTMENT (OUTPATIENT)
Dept: LAB | Facility: HOSPITAL | Age: 70
End: 2020-05-19

## 2020-05-19 PROBLEM — R11.2 NAUSEA AND VOMITING: Status: RESOLVED | Noted: 2020-05-06 | Resolved: 2020-05-19

## 2020-05-19 LAB
ACT BLD: 252 SECONDS (ref 82–152)
ACT BLD: 307 SECONDS (ref 82–152)
ANION GAP SERPL CALCULATED.3IONS-SCNC: 13.1 MMOL/L (ref 5–15)
BACTERIA UR QL AUTO: ABNORMAL /HPF
BILIRUB UR QL STRIP: NEGATIVE
BUN BLD-MCNC: 31 MG/DL (ref 8–23)
BUN/CREAT SERPL: 18.3 (ref 7–25)
CALCIUM SPEC-SCNC: 8.5 MG/DL (ref 8.6–10.5)
CHLORIDE SERPL-SCNC: 105 MMOL/L (ref 98–107)
CLARITY UR: CLEAR
CO2 SERPL-SCNC: 20.9 MMOL/L (ref 22–29)
COLOR UR: YELLOW
CREAT BLD-MCNC: 1.69 MG/DL (ref 0.76–1.27)
CREAT UR-MCNC: 111.6 MG/DL
GFR SERPL CREATININE-BSD FRML MDRD: 40 ML/MIN/1.73
GLUCOSE BLD-MCNC: 106 MG/DL (ref 65–99)
GLUCOSE BLDC GLUCOMTR-MCNC: 136 MG/DL (ref 70–130)
GLUCOSE BLDC GLUCOMTR-MCNC: 169 MG/DL (ref 70–130)
GLUCOSE BLDC GLUCOMTR-MCNC: 91 MG/DL (ref 70–130)
GLUCOSE BLDC GLUCOMTR-MCNC: 99 MG/DL (ref 70–130)
GLUCOSE UR STRIP-MCNC: ABNORMAL MG/DL
HGB UR QL STRIP.AUTO: NEGATIVE
HYALINE CASTS UR QL AUTO: ABNORMAL /LPF
KETONES UR QL STRIP: NEGATIVE
LEUKOCYTE ESTERASE UR QL STRIP.AUTO: ABNORMAL
MAGNESIUM SERPL-MCNC: 1.8 MG/DL (ref 1.6–2.4)
NITRITE UR QL STRIP: NEGATIVE
PH UR STRIP.AUTO: <=5 [PH] (ref 5–8)
POTASSIUM BLD-SCNC: 4.5 MMOL/L (ref 3.5–5.2)
PROT UR QL STRIP: NEGATIVE
PROT UR-MCNC: 12 MG/DL
RBC # UR: ABNORMAL /HPF
REF LAB TEST METHOD: ABNORMAL
SODIUM BLD-SCNC: 139 MMOL/L (ref 136–145)
SP GR UR STRIP: 1.03 (ref 1–1.03)
SQUAMOUS #/AREA URNS HPF: ABNORMAL /HPF
UROBILINOGEN UR QL STRIP: ABNORMAL
WBC UR QL AUTO: ABNORMAL /HPF

## 2020-05-19 PROCEDURE — A9270 NON-COVERED ITEM OR SERVICE: HCPCS | Performed by: INTERNAL MEDICINE

## 2020-05-19 PROCEDURE — 25010000002 MIDAZOLAM PER 1 MG: Performed by: INTERNAL MEDICINE

## 2020-05-19 PROCEDURE — 99225 PR SBSQ OBSERVATION CARE/DAY 25 MINUTES: CPT | Performed by: INTERNAL MEDICINE

## 2020-05-19 PROCEDURE — C9600 PERC DRUG-EL COR STENT SING: HCPCS | Performed by: INTERNAL MEDICINE

## 2020-05-19 PROCEDURE — 85347 COAGULATION TIME ACTIVATED: CPT

## 2020-05-19 PROCEDURE — 83735 ASSAY OF MAGNESIUM: CPT | Performed by: INTERNAL MEDICINE

## 2020-05-19 PROCEDURE — 93571 IV DOP VEL&/PRESS C FLO 1ST: CPT | Performed by: INTERNAL MEDICINE

## 2020-05-19 PROCEDURE — 84156 ASSAY OF PROTEIN URINE: CPT | Performed by: INTERNAL MEDICINE

## 2020-05-19 PROCEDURE — C1725 CATH, TRANSLUMIN NON-LASER: HCPCS | Performed by: INTERNAL MEDICINE

## 2020-05-19 PROCEDURE — 63710000001 INSULIN GLARGINE PER 5 UNITS: Performed by: INTERNAL MEDICINE

## 2020-05-19 PROCEDURE — 93458 L HRT ARTERY/VENTRICLE ANGIO: CPT | Performed by: INTERNAL MEDICINE

## 2020-05-19 PROCEDURE — 63710000001 GLIPIZIDE 10 MG TABLET: Performed by: INTERNAL MEDICINE

## 2020-05-19 PROCEDURE — 93572 IV DOP VEL&/PRESS C FLO EA: CPT | Performed by: INTERNAL MEDICINE

## 2020-05-19 PROCEDURE — 93010 ELECTROCARDIOGRAM REPORT: CPT | Performed by: INTERNAL MEDICINE

## 2020-05-19 PROCEDURE — 63710000001 METOPROLOL SUCCINATE XL 50 MG TABLET SUSTAINED-RELEASE 24 HOUR: Performed by: INTERNAL MEDICINE

## 2020-05-19 PROCEDURE — 25010000002 ONDANSETRON PER 1 MG: Performed by: INTERNAL MEDICINE

## 2020-05-19 PROCEDURE — C1894 INTRO/SHEATH, NON-LASER: HCPCS | Performed by: INTERNAL MEDICINE

## 2020-05-19 PROCEDURE — 82962 GLUCOSE BLOOD TEST: CPT

## 2020-05-19 PROCEDURE — 80048 BASIC METABOLIC PNL TOTAL CA: CPT | Performed by: INTERNAL MEDICINE

## 2020-05-19 PROCEDURE — 0 IOPAMIDOL PER 1 ML: Performed by: INTERNAL MEDICINE

## 2020-05-19 PROCEDURE — 25010000002 ENOXAPARIN PER 10 MG: Performed by: INTERNAL MEDICINE

## 2020-05-19 PROCEDURE — 82570 ASSAY OF URINE CREATININE: CPT | Performed by: INTERNAL MEDICINE

## 2020-05-19 PROCEDURE — 25010000002 FENTANYL CITRATE (PF) 100 MCG/2ML SOLUTION: Performed by: INTERNAL MEDICINE

## 2020-05-19 PROCEDURE — 99153 MOD SED SAME PHYS/QHP EA: CPT | Performed by: INTERNAL MEDICINE

## 2020-05-19 PROCEDURE — 81001 URINALYSIS AUTO W/SCOPE: CPT | Performed by: INTERNAL MEDICINE

## 2020-05-19 PROCEDURE — 99152 MOD SED SAME PHYS/QHP 5/>YRS: CPT | Performed by: INTERNAL MEDICINE

## 2020-05-19 PROCEDURE — 25010000002 HEPARIN (PORCINE) PER 1000 UNITS: Performed by: INTERNAL MEDICINE

## 2020-05-19 PROCEDURE — C1769 GUIDE WIRE: HCPCS | Performed by: INTERNAL MEDICINE

## 2020-05-19 PROCEDURE — 92928 PRQ TCAT PLMT NTRAC ST 1 LES: CPT | Performed by: INTERNAL MEDICINE

## 2020-05-19 PROCEDURE — C1874 STENT, COATED/COV W/DEL SYS: HCPCS | Performed by: INTERNAL MEDICINE

## 2020-05-19 PROCEDURE — G0378 HOSPITAL OBSERVATION PER HR: HCPCS

## 2020-05-19 PROCEDURE — 93005 ELECTROCARDIOGRAM TRACING: CPT | Performed by: INTERNAL MEDICINE

## 2020-05-19 PROCEDURE — C1887 CATHETER, GUIDING: HCPCS | Performed by: INTERNAL MEDICINE

## 2020-05-19 PROCEDURE — 82668 ASSAY OF ERYTHROPOIETIN: CPT | Performed by: INTERNAL MEDICINE

## 2020-05-19 DEVICE — XIENCE SIERRA™ EVEROLIMUS ELUTING CORONARY STENT SYSTEM 2.75 MM X 33 MM / RAPID-EXCHANGE
Type: IMPLANTABLE DEVICE | Status: FUNCTIONAL
Brand: XIENCE SIERRA™

## 2020-05-19 RX ORDER — SODIUM CHLORIDE 9 MG/ML
INJECTION, SOLUTION INTRAVENOUS CONTINUOUS PRN
Status: COMPLETED | OUTPATIENT
Start: 2020-05-19 | End: 2020-05-19

## 2020-05-19 RX ORDER — INSULIN GLARGINE 100 [IU]/ML
55 INJECTION, SOLUTION SUBCUTANEOUS NIGHTLY
Status: DISCONTINUED | OUTPATIENT
Start: 2020-05-19 | End: 2020-05-20 | Stop reason: HOSPADM

## 2020-05-19 RX ORDER — FENTANYL CITRATE 50 UG/ML
INJECTION, SOLUTION INTRAMUSCULAR; INTRAVENOUS AS NEEDED
Status: DISCONTINUED | OUTPATIENT
Start: 2020-05-19 | End: 2020-05-19 | Stop reason: HOSPADM

## 2020-05-19 RX ORDER — LIDOCAINE HYDROCHLORIDE 20 MG/ML
INJECTION, SOLUTION INFILTRATION; PERINEURAL AS NEEDED
Status: DISCONTINUED | OUTPATIENT
Start: 2020-05-19 | End: 2020-05-19 | Stop reason: HOSPADM

## 2020-05-19 RX ORDER — SODIUM CHLORIDE 9 MG/ML
100 INJECTION, SOLUTION INTRAVENOUS CONTINUOUS
Status: DISCONTINUED | OUTPATIENT
Start: 2020-05-19 | End: 2020-05-19

## 2020-05-19 RX ORDER — MIDAZOLAM HYDROCHLORIDE 1 MG/ML
INJECTION INTRAMUSCULAR; INTRAVENOUS AS NEEDED
Status: DISCONTINUED | OUTPATIENT
Start: 2020-05-19 | End: 2020-05-19 | Stop reason: HOSPADM

## 2020-05-19 RX ORDER — ASPIRIN 81 MG/1
TABLET, CHEWABLE ORAL AS NEEDED
Status: DISCONTINUED | OUTPATIENT
Start: 2020-05-19 | End: 2020-05-19 | Stop reason: HOSPADM

## 2020-05-19 RX ORDER — ONDANSETRON 2 MG/ML
INJECTION INTRAMUSCULAR; INTRAVENOUS AS NEEDED
Status: DISCONTINUED | OUTPATIENT
Start: 2020-05-19 | End: 2020-05-19 | Stop reason: HOSPADM

## 2020-05-19 RX ORDER — CLOPIDOGREL BISULFATE 75 MG/1
TABLET ORAL AS NEEDED
Status: DISCONTINUED | OUTPATIENT
Start: 2020-05-19 | End: 2020-05-19 | Stop reason: HOSPADM

## 2020-05-19 RX ORDER — HEPARIN SODIUM 1000 [USP'U]/ML
INJECTION, SOLUTION INTRAVENOUS; SUBCUTANEOUS AS NEEDED
Status: DISCONTINUED | OUTPATIENT
Start: 2020-05-19 | End: 2020-05-19 | Stop reason: HOSPADM

## 2020-05-19 RX ADMIN — INSULIN GLARGINE 55 UNITS: 100 INJECTION, SOLUTION SUBCUTANEOUS at 20:43

## 2020-05-19 RX ADMIN — GLIPIZIDE 10 MG: 10 TABLET ORAL at 17:34

## 2020-05-19 RX ADMIN — ASPIRIN 81 MG: 81 TABLET, CHEWABLE ORAL at 08:02

## 2020-05-19 RX ADMIN — NITROGLYCERIN 1 INCH: 20 OINTMENT TOPICAL at 06:44

## 2020-05-19 RX ADMIN — ATORVASTATIN CALCIUM 20 MG: 20 TABLET, FILM COATED ORAL at 08:02

## 2020-05-19 RX ADMIN — PANTOPRAZOLE SODIUM 40 MG: 40 TABLET, DELAYED RELEASE ORAL at 06:44

## 2020-05-19 RX ADMIN — SODIUM CHLORIDE, PRESERVATIVE FREE 10 ML: 5 INJECTION INTRAVENOUS at 08:02

## 2020-05-19 RX ADMIN — NITROGLYCERIN 1 INCH: 20 OINTMENT TOPICAL at 13:47

## 2020-05-19 RX ADMIN — CLOPIDOGREL 75 MG: 75 TABLET, FILM COATED ORAL at 08:02

## 2020-05-19 RX ADMIN — DULOXETINE HYDROCHLORIDE 20 MG: 20 CAPSULE, DELAYED RELEASE ORAL at 08:02

## 2020-05-19 RX ADMIN — GLIPIZIDE 10 MG: 10 TABLET ORAL at 08:02

## 2020-05-19 RX ADMIN — SODIUM CHLORIDE, PRESERVATIVE FREE 10 ML: 5 INJECTION INTRAVENOUS at 20:43

## 2020-05-19 RX ADMIN — METOPROLOL SUCCINATE 50 MG: 50 TABLET, EXTENDED RELEASE ORAL at 20:41

## 2020-05-19 RX ADMIN — METOPROLOL SUCCINATE 50 MG: 50 TABLET, EXTENDED RELEASE ORAL at 08:02

## 2020-05-19 RX ADMIN — ENOXAPARIN SODIUM 40 MG: 40 INJECTION SUBCUTANEOUS at 17:34

## 2020-05-19 NOTE — PROGRESS NOTES
"Discharge Planning Assessment  [facility]     Patient Name: Jett Flower  MRN: [id number]  Today's Date: 5/19/2020    Admit Date: 5/18/2020    Discharge Needs Assessment     Row Name 05/19/20 1608       Living Environment    Lives With  spouse    Name(s) of Who Lives With Patient  Sarah Flower, spouse    Current Living Arrangements  home/apartment/condo    Primary Care Provided by  self    Provides Primary Care For  pet(s) 1 dog    Family Caregiver if Needed  spouse    Family Caregiver Names  Sarah, spouse    Quality of Family Relationships  helpful;involved;supportive    Able to Return to Prior Arrangements  yes       Resource/Environmental Concerns    Resource/Environmental Concerns  none       Transition Planning    Patient/Family Anticipates Transition to  home with family    Patient/Family Anticipated Services at Transition  none    Transportation Anticipated  family or friend will provide       Discharge Needs Assessment    Readmission Within the Last 30 Days  no previous admission in last 30 days    Concerns to be Addressed  no discharge needs identified;denies needs/concerns at this time    Equipment Currently Used at Home  cane, straight;glucometer;other (see comments) Pt reports he has a rollator and CPAP but does not use.      Anticipated Changes Related to Illness  none    Equipment Needed After Discharge  none    Provided Post Acute Provider List?  Refused    Refused Provider List Comment  Pt declined need for list.        Discharge Plan     Row Name 05/19/20 1609       Plan    Plan  Home w/ wife;  Denies needs.      Plan Comments  CCP spoke with Pt at bedside.  CCP introduced self and role.  Pt confirmed information on face sheet.  Pt stated he is IADL'S, retired and & drives.  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  Pt lives in a single story home with his spouse, Sarah Flower [phone].  Pt reports PCP is Devon Monroe.  Pt confirms pharmacy as [facility], " [place].  Pt denies issues with affording his medications.  Pt denies past home health and sub-acute rehab.  Pt reports he uses a straight cane when ambulating.  Pt reports he has glucometer at home.  Pt reports he has a CPAP at home he does not use.  Pt plans to return home at discharge.  Pt denies known d/c needs at this time.  CCP will continue to follow…TAMY REYES/CCP        Destination      Coordination has not been started for this encounter.      Durable Medical Equipment      Coordination has not been started for this encounter.      Dialysis/Infusion      Coordination has not been started for this encounter.      Home Medical Care      Coordination has not been started for this encounter.      Therapy      Coordination has not been started for this encounter.      Community Resources      Coordination has not been started for this encounter.          Demographic Summary     Row Name 05/19/20 1607       General Information    Admission Type  observation    Arrived From  home    Required Notices Provided  Observation Status Notice    Referral Source  admission list    Reason for Consult  discharge planning    Preferred Language  English     Used During This Interaction  no        Functional Status     Row Name 05/19/20 1607       Functional Status    Usual Activity Tolerance  moderate    Current Activity Tolerance  moderate       Functional Status, IADL    Medications  independent    Meal Preparation  assistive person    Housekeeping  assistive person    Laundry  assistive person    Shopping  assistive person       Mental Status    General Appearance WDL  WDL       Mental Status Summary    Recent Changes in Mental Status/Cognitive Functioning  no changes       Employment/    Employment Status  retired        Psychosocial    No documentation.       Abuse/Neglect    No documentation.       Legal    No documentation.       Substance Abuse    No documentation.       Patient Forms    No  documentation.           Alyse Douglas RN

## 2020-05-19 NOTE — PROGRESS NOTES
"    Patient Name: Jett Flower  :1950  70 y.o.      Patient Care Team:  Devon Monroe DO as PCP - General (Family Medicine)    Chief Complaint: CAD    Interval History: cath today, KENNEDY to LAD   Echo- Interpretation Summary     · Left ventricular systolic function is hyperdynamic (EF > 70%). Calculated EF = 76.0%. Estimated EF was in agreement with the calculated EF. Estimated EF = 76%. Normal left ventricular cavity size noted. All left ventricular wall segments contract normally. Left ventricular wall thickness is consistent with concentric hypertrophy.  · The basal left ventricular septum is sigmoid with a late peaking signal in the outflow tract without significant obstruction. The outflow tract itself was not be well visualized. Left ventricular diastolic dysfunction is noted (grade I) consistent with impaired relaxation.  · The aortic valve is abnormal in structure. There is moderate thickening of the aortic valve.         Objective   Vital Signs  Temp:  [97.1 °F (36.2 °C)-98.4 °F (36.9 °C)] 97.5 °F (36.4 °C)  Heart Rate:  [57-74] 57  Resp:  [14-19] 16  BP: ()/(53-87) 136/80    Intake/Output Summary (Last 24 hours) at 2020 1323  Last data filed at 2020 1004  Gross per 24 hour   Intake 800 ml   Output --   Net 800 ml     Flowsheet Rows      First Filed Value   Admission Height  162.6 cm (64\") Documented at 2020 1149   Admission Weight  90.7 kg (200 lb) Documented at 2020 1149          Physical Exam:   General Appearance:    Alert, cooperative, in no acute distress   Lungs:     Clear to auscultation.  Normal respiratory effort and rate.      Heart:    Regular rhythm and normal rate, normal S1 and S2, no murmurs, gallops or rubs.     Chest Wall:    No abnormalities observed   Abdomen:     Soft, nontender, positive bowel sounds.     Extremities:   no cyanosis, clubbing or edema.  No marked joint deformities.  Adequate musculoskeletal strength.       Results Review:  "   Results from last 7 days   Lab Units 05/19/20  0352   SODIUM mmol/L 139   POTASSIUM mmol/L 4.5   CHLORIDE mmol/L 105   CO2 mmol/L 20.9*   BUN mg/dL 31*   CREATININE mg/dL 1.69*   GLUCOSE mg/dL 106*   CALCIUM mg/dL 8.5*     Results from last 7 days   Lab Units 05/18/20  1830 05/18/20  1654 05/18/20  1400   TROPONIN T ng/mL <0.010 <0.010 <0.010     Results from last 7 days   Lab Units 05/18/20  1136   WBC 10*3/mm3 12.88*   HEMOGLOBIN g/dL 18.0*   HEMATOCRIT % 52.4*   PLATELETS 10*3/mm3 170     Results from last 7 days   Lab Units 05/18/20  1136   INR  1.03     Results from last 7 days   Lab Units 05/19/20  0352   MAGNESIUM mg/dL 1.8                   Medication Review:     aspirin 81 mg Oral Daily   atorvastatin 20 mg Oral Daily   clopidogrel 75 mg Oral Daily   DULoxetine 20 mg Oral Daily   enoxaparin 40 mg Subcutaneous Q24H   glipizide 10 mg Oral BID AC   insulin lispro 0-9 Units Subcutaneous TID AC   metoprolol succinate XL 50 mg Oral BID   nitroglycerin 1 inch Topical Q6H   pantoprazole 40 mg Oral QAM   sodium chloride 10 mL Intravenous Q12H          sodium chloride 50 mL/hr Last Rate: 50 mL/hr (05/18/20 2039)   sodium chloride 100 mL/hr        Assessment/Plan   Patient Active Problem List   Diagnosis   • Nausea and vomiting   • Rectal bleeding   • Unstable angina (CMS/[facility])   • Chronic renal failure   • Type 2 diabetes mellitus with circulatory disorder, with long-term current use of insulin (CMS/[facility])   • Polycythemia     1.  Unstable angina/CAD- KENNEDY to LAD today  2.  NL EF on Echo without any WMA  3.  Chronic renal failure- appreciate nephrology input  4.  Diabetes mellitus with circulatory complication - appreciate A assistance  5.  Elevated hemoglobin/hematocrit-[facility] is following    Augusto Gloria III, MD  [facility]  05/19/20  13:23

## 2020-05-19 NOTE — PROGRESS NOTES
Name: Jett Flower ADMIT: 2020   : 1950  PCP: Devon Monroe DO    MRN: [id number] LOS: 1 days   AGE/SEX: 70 y.o. male  ROOM: [facility]/     Subjective   Subjective   S/P cardiac cath this morning. Denies chest pain; has chronic dyspnea. No other complaints.    Review of Systems   Constitutional: Negative.    HENT: Negative.    Respiratory: Positive for shortness of breath.    Cardiovascular: Negative.    Gastrointestinal: Negative.    Genitourinary: Negative.    Musculoskeletal: Negative.    Skin: Negative.    Neurological: Negative.    Psychiatric/Behavioral: Negative.         Objective   Objective   Vital Signs  Temp:  [97.1 °F (36.2 °C)-98.4 °F (36.9 °C)] 97.5 °F (36.4 °C)  Heart Rate:  [57-74] 57  Resp:  [14-19] 16  BP: ()/(53-87) 136/80  SpO2:  [95 %-96 %] 95 %  on  Flow (L/min):  [3] 3;   Device (Oxygen Therapy): room air  Body mass index is 34.06 kg/m².  Physical Exam   Constitutional: He is oriented to person, place, and time. No distress.   HENT:   Head: Normocephalic.   Eyes: EOM are normal.   Neck: No JVD present.   Cardiovascular: Normal rate and regular rhythm.   Pulmonary/Chest: Effort normal and breath sounds normal. No respiratory distress.   Abdominal: Soft. Bowel sounds are normal.   Musculoskeletal: He exhibits no edema.   Neurological: He is alert and oriented to person, place, and time.   Skin: Skin is warm and dry.   Psychiatric: He has a normal mood and affect.   Vitals reviewed.      Results Review:       I reviewed the patient's new clinical results.  Results from last 7 days   Lab Units 20  1136 20  1048   WBC 10*3/mm3 12.88* 8.49   HEMOGLOBIN g/dL 18.0* 16.1   PLATELETS 10*3/mm3 170 160     Results from last 7 days   Lab Units 20  0352 20  1400 20  1136 20  1048   SODIUM mmol/L 139 139 137 139   POTASSIUM mmol/L 4.5 5.1 4.8 5.0   CHLORIDE mmol/L 105 103 100 102   CO2 mmol/L 20.9* 20.7* 23.7 23.4   BUN mg/dL 31* 23 23 25*      CREATININE mg/dL 1.69* 1.33* 1.36* 1.30*   GLUCOSE mg/dL 106* 120* 164* 182*   Estimated Creatinine Clearance: 41.1 mL/min (A) (by C-G formula based on SCr of 1.69 mg/dL (H)).  Results from last 7 days   Lab Units 20  1136 20  1048   ALBUMIN g/dL 4.40 4.00   BILIRUBIN mg/dL 0.6 0.5   ALK PHOS U/L 83 78   AST (SGOT) U/L 15 13   ALT (SGPT) U/L 12 11     Results from last 7 days   Lab Units 20  0352 20  1400 20  1136 20  1048   CALCIUM mg/dL 8.5* 9.2 9.5 9.7   ALBUMIN g/dL  --   --  4.40 4.00   MAGNESIUM mg/dL 1.8  --   --   --        Hemoglobin A1C   Date/Time Value Ref Range Status   2020 1136 9.49 (H) 4.80 - 5.60 % Final     Glucose   Date/Time Value Ref Range Status   2020 1032 91 70 - 130 mg/dL Final   2020 0623 99 70 - 130 mg/dL Final   2020 2111 135 (H) 70 - 130 mg/dL Final       Cardiac Catheterization/Vascular Study  [facility]   CARDIAC CATHETERIZATION PROCEDURE REPORT    Patient: Jett Flower  : 1950  MRN: [id number]  Procedure Date: 20    Referring Physician:   Augusto Gloria MD    Interventional Cardiologist:   Trae Kemp MD    Indication:  1. Unstable angina    Clinical Presentation:  Mr. Flower is a 70-year-old gentleman with past medical history   notable for coronary artery disease status post percutaneous intervention,   hypertension, mixed hyperlipidemia, and diabetes who presents to the   hospital with symptoms concerning for unstable angina.  Initially he was   planning on being sent home to follow-up as an outpatient but had   recurrent symptoms and thus re-presented to the hospital.  Given his high   risk presentation he was referred for cardiac catheterization.    Procedure performed:  1. Diagnostic Left Heart Catheterization  2. Coronary Angiography  3. Drug eluting stent to LAD  4. RFR LAD   5. RFR circumflex     Access Site(s):  1. Right radial artery    Findings:  1. Coronary Artery  Anatomy:  Dominance: Right  Left Main: Angiographically normal  Left Anterior Descending: Moderate in caliber size.  The proximal segment   contains luminal irregularities.  There is a patent stent within the mid   segment which contains diffuse moderate 50-60 in-stent restenosis within   the proximal segment however in the distal segment there is 70% segment   stenoses with YING II flow distally.  The distal LAD contains scattered   50% segment stenoses.  There are 2 small diagonal branches which contain   luminal irregularities.  Circumflex Artery: Moderate in caliber size.  Contains a 50% mid segment   stenoses.  The first marginal is relatively small in size and contains a   70% ostial segment stenoses.  Right Coronary Artery: Large in caliber size.  Contains luminal   irregularities throughout.    2. Hemodynamics:  Left Ventricle: 91/5/8 mmHg  Aorta: 91/52/69 mmHg    3. rFR Assessment:  LAD: 0.86  LCx: 0.93    4. Percutaneous Intervention:  Location: Mid LAD  Treatment: Lesion was predilated with a 2.75 compliant balloon followed by   a 3.0 x 10 mm angiosculpt balloon followed by placement of a 2.75 x 33 mm   Xience drug-eluting stent which was postdilated with a 3.0 noncompliant   balloon in the distal and mid portions and a 3.5 noncompliant balloon in   the proximal and mid portions.  Pre-stenosis: 70 %  Post-stenosis: 0 %  Lesion Type C: Yes  YING Flow Pre: 2  YING Flow Post: 3  Bifurcation: No  Severe Calcium: No  Dissection: No    Conclusions:  1. Severe single-vessel coronary artery disease with in-stent restenosis   of the mid LAD stent with 70% distal segment stenoses.  Moderate 50%   stenoses in the mid circumflex.  70% stenoses of the first marginal.  2. Confirmation of hemodynamic flow-limiting stenosis of the LAD with RFR   assessment at 0.86 as well as confirming nonsignificant circumflex   stenoses with RFR assessment at 0.93.  3. Successful percutaneous intervention with placement of 2.75 x 33 mm    Xience drug-eluting stent which was postdilated distally with a 3.0 mm   noncompliant balloon to 20 mery and a 3.5 mm noncompliant balloon   proximally to 16 mery with no residual stenosis and restoration of YING-3   flow.    Recommendations:   1. Patient was loaded with clopidogrel in the Cath Lab and continue with   optimal medical therapy for management of his coronary artery disease.  We   can reassess his symptoms and consider staged PCI to his marginal if   significantly symptomatic however this is a relatively small vessel and   may do very well with medical management alone.  2. IV hydration post procedure    Procedure Status:  Urgent    Procedure Details:  Informed consent was obtained with an explanation of the risk and benefits   of the procedure. The patient was brought to the Cardiac Catheterization   Laboratory and was prepped and draped in a standard sterile fashion.   Moderate sedation with Fentanyl and Versed was administered by the   circulating nurse. Lidocaine 2% was used to anesthetize the right radial   artery and a 5/6 Slender sheath was placed.      A TGR catheter was then advanced over a 0.035 guidewire into the ascending   aorta.  This catheter was used to engage the right and left coronary   arteries with diagnostic angiography obtained.  We then exchanged for an   angled pigtail catheter and enter the left ventricle to measure   hemodynamics.      We then exchanged for an EBU 3.5 and engaged the LAD.  A FFR guidewire was   then advanced into the left main and normalized.  We first measured the   RFR of the LAD which was found to be hemodynamically significant.  We then   moved our wire into the circumflex and found this not to be   hemodynamically significant.    We then exchanged for a pro-water guidewire and advanced this down the   LAD.  We predilated the lesion with a 2.75 mm compliant balloon followed   by a 3.0 angioscope balloon to help better prepped the lesion given some   amount  of scar tissue.  We then exchanged for a 2.75 x 33 mm Xience   drug-eluting stent and positioned this across the stenoses and deployed   our stent at nominal pressures.  We put our stent balloon back slightly   and postdilated to 16 mery.  We then exchanged for a 3.0 noncompliant   balloon and postdilated the distal and mid segments to 20 mery.  We then   exchanged for a 3.5 mm noncompliant balloon and postdilated at the   proximal mid segments to 16 mery.  We then removed our guidewire balloons   and took our final angiogram which demonstrated no residual stenoses and   restoration of YING-3 flow distally.    The catheter was then removed over a guidewire. The radial artery sheath   was removed without difficulty and TR Band was placed over the access site   with excellent hemostasis.     Patient tolerated the procedure well without any complications, and   transferred to the post procedure area for recovery in a stable condition.    Complications:  None.    Estimated Blood Loss:  Minimal.    Trae Kemp MD  [facility]  05/19/20  10:16        aspirin 81 mg Oral Daily   atorvastatin 20 mg Oral Daily   clopidogrel 75 mg Oral Daily   DULoxetine 20 mg Oral Daily   enoxaparin 40 mg Subcutaneous Q24H   glipizide 10 mg Oral BID AC   insulin lispro 0-9 Units Subcutaneous TID AC   metoprolol succinate XL 50 mg Oral BID   nitroglycerin 1 inch Topical Q6H   pantoprazole 40 mg Oral QAM   sodium chloride 10 mL Intravenous Q12H       sodium chloride 50 mL/hr Last Rate: 50 mL/hr (05/19/20 1325)   sodium chloride 100 mL/hr    Diet Regular; Cardiac       Assessment/Plan     Active Hospital Problems    Diagnosis  POA   • **Unstable angina (CMS/[facility]) [I20.0]  Yes   • Chronic renal failure [N18.9]  Yes   • Type 2 diabetes mellitus with circulatory disorder, with long-term current use of insulin (CMS/HCC) [E11.59, Z79.4]  Not Applicable   • Polycythemia [D75.1]  Unknown      Resolved Hospital Problems    Diagnosis  Date Resolved POA   • Nausea and vomiting [R11.2] 05/19/2020 Yes       70 y.o. male admitted with Unstable angina (CMS/[facility]).    Chest pain:  -S/P cardiac cath w/drug eluting stent to LAD  -Plavix, ASA, statin    DM II w/long term use of insulin:  -A1C 9.49%  -Home regimen: Lantus, Humalog, glipizide, metformin XR  -Monitor BG trends; acceptable at this time  -Will likely resume home regimen at discharge  -Continue to monitor need to adjust treatment during hospitalization    Polycythemia:  -Hgb 18 on admission; not repeated today  -Will trend    CKD:  -Nephrology following        CELESTE Delgadillo  [facility]  05/19/20  14:38

## 2020-05-19 NOTE — PLAN OF CARE
Problem: Patient Care Overview  Goal: Plan of Care Review  Outcome: Ongoing (interventions implemented as appropriate)  Flowsheets (Taken 5/19/2020 0022)  Outcome Summary: Pt has no complaints of CP overnight. Renal has cleared pt for possible cath in AM. Echo done. IVF started. VSS. Continue to monitor. Safety maintained.

## 2020-05-19 NOTE — CONSULTS
Provided phase II information along with the contact information for cardiac rehab at [facility].

## 2020-05-19 NOTE — DISCHARGE INSTRUCTIONS
[facility]  [address] [place] [address]    Coronary Angiogram with Stent (Radial Approach) After Care    Refer to this sheet in the next few weeks. These instructions provide you with information on caring for yourself after your procedure. Your health care provider may also give you more specific instructions. Your treatment has been planned according to current medical practices, but problems sometimes occur. Call your health care provider if you have any problems or questions after your procedure.       Home Care Instructions:  · You may shower the day after the procedure. Remove the bandage (dressing) and gently wash the site with plain soap and water. Gently pat the site dry. You may apply a band aid daily for 2 days if desired.    · Do not apply powder or lotion to the site.  · Do not submerge the affected site in water for 3 to 5 days or until the site is completely healed.   · Do not flex or bend the affected arm for 24 hours.  · Do not lift, push or pull anything over 10 pounds for 2 days after your procedure.  · Do not operate machinery or power tools for 24 hours.  · Inspect the site at least twice daily. You may notice some bruising at the site and it may be tender for 1 to 2 weeks.     · Increase your fluid intake for the next 2 days.    · Keep arm elevated for 24 hours.  For the remainder of the day, keep your arm in the “Pledge of Allegiance” position when up and about.    · Limit your activity for the next 48 hours and avoid strenuous activity as directed by your physician.   · Cardiac Rehab may or may not be ordered.  Please consult with your physician  · You may drive 24 hours after the procedure unless otherwise instructed by your caregiver.  · A responsible adult should be with you for the first 24 hours after you arrive home.   · Do not make any important legal decisions or sign legal papers for 24 hours. Do not drink alcohol for 24 hours.    · Take medicines only as  directed by your health care provider.  Blood thinners may be prescribed after your procedure to improve blood flow through the stent.    · Metformin or any medications containing Metformin should not be taken for 48 hours after your procedure.    · Eat a heart-healthy diet. This should include plenty of fresh fruits and vegetables. Meat should be lean cuts. Avoid the following types of food:    ¨ Food that is high in salt.    ¨ Canned or highly processed food.    ¨ Food that is high in saturated fat or sugar.    ¨ Fried food.    · Make any other lifestyle changes recommended by your health care provider. This may include:    ¨ Not using any tobacco products including cigarettes, chewing tobacco, or electronic cigarettes.   ¨ Managing your weight.    ¨ Getting regular exercise.    ¨ Managing your blood pressure.    ¨ Limiting your alcohol intake.    ¨ Managing other health problems, such as diabetes.    · If you need an MRI after your heart stent was placed, be sure to tell the health care provider who orders the MRI that you have a heart stent.    · Keep all follow-up visits as directed by your health care provider.    ·   Call Your Doctor If:  · You have unusual pain at the radial/ulnar (wrist) site.  · You have redness, warmth, swelling, or pain at the radial/ulnar (wrist) site.  · You have drainage (other than a small amount of blood on the dressing).  · `You have chills or a fever > 101.  · Your arm becomes pale or dark, cool, tingly, or numb.  · You develop chest pain, shortness of breath, feel faint or pass out.    · You have heavy bleeding from the site, hold pressure on the site for 20 minutes.  If the bleeding stops, apply a fresh bandage and call your cardiologist.  However, if you continue to have bleeding, call 911.        Make Sure You:   · Understand these instructions.  · Will watch your condition.  · Will get help right away if you are not doing well or get worse.

## 2020-05-19 NOTE — PLAN OF CARE
Pt received from the cath lab rt radial site CDI. Vitals stable. No c/o of CP. Pt is a daily weight and strict I/O will cont to monitor

## 2020-05-19 NOTE — PROGRESS NOTES
"   LOS: 1 day    Patient Care Team:  Devon Monroe DO as PCP - General (Family Medicine)    Chief Complaint:    Chief Complaint   Patient presents with   • Chest Pain     Follow UP CKD3  Subjective     Interval History:   SP LAD KENNEDY today.  BP better today. Urinating . Urine not recorded.   Bowels moving .  Not soa.  No pain .    Review of Systems:   See above .    Objective     Vital Signs  Temp:  [97.1 °F (36.2 °C)-97.8 °F (36.6 °C)] 97.8 °F (36.6 °C)  Heart Rate:  [57-71] 57  Resp:  [16-19] 16  BP: ()/(53-88) 123/85    Flowsheet Rows      First Filed Value   Admission Height  162.6 cm (64\") Documented at 05/18/2020 1149   Admission Weight  90.7 kg (200 lb) Documented at 05/18/2020 1149          I/O this shift:  In: 800 [I.V.:800]  Out: 300 [Urine:300]  No intake/output data recorded.    Intake/Output Summary (Last 24 hours) at 5/19/2020 1745  Last data filed at 5/19/2020 1700  Gross per 24 hour   Intake 800 ml   Output 300 ml   Net 500 ml       Physical Exam:  Awake, alert. Conversant  Sitting up in bed  Oral mucosa moist  Neck no jvd  Heart RRR no s3 or rub  Lungs clear to auscultation  Abd + bs, soft, nontender  Ext no edema  Right radial site intact.   Skin no rash .  Neuro oriented. Speech fluent. Moves all 4 ext .      Results Review:    Results from last 7 days   Lab Units 05/19/20  0352 05/18/20  1400 05/18/20  1136 05/17/20  1048   SODIUM mmol/L 139 139 137 139   POTASSIUM mmol/L 4.5 5.1 4.8 5.0   CHLORIDE mmol/L 105 103 100 102   CO2 mmol/L 20.9* 20.7* 23.7 23.4   BUN mg/dL 31* 23 23 25*   CREATININE mg/dL 1.69* 1.33* 1.36* 1.30*   CALCIUM mg/dL 8.5* 9.2 9.5 9.7   BILIRUBIN mg/dL  --   --  0.6 0.5   ALK PHOS U/L  --   --  83 78   ALT (SGPT) U/L  --   --  12 11   AST (SGOT) U/L  --   --  15 13   GLUCOSE mg/dL 106* 120* 164* 182*       Estimated Creatinine Clearance: 41.1 mL/min (A) (by C-G formula based on SCr of 1.69 mg/dL (H)).    Results from last 7 days   Lab Units 05/19/20  0352   MAGNESIUM " mg/dL 1.8             Results from last 7 days   Lab Units 05/18/20  1136 05/17/20  1048   WBC 10*3/mm3 12.88* 8.49   HEMOGLOBIN g/dL 18.0* 16.1   PLATELETS 10*3/mm3 170 160       Results from last 7 days   Lab Units 05/18/20  1136   INR  1.03         Imaging Results (Last 24 Hours)     ** No results found for the last 24 hours. **          aspirin 81 mg Oral Daily   atorvastatin 20 mg Oral Daily   clopidogrel 75 mg Oral Daily   DULoxetine 20 mg Oral Daily   enoxaparin 40 mg Subcutaneous Q24H   glipizide 10 mg Oral BID AC   insulin glargine 55 Units Subcutaneous Nightly   insulin lispro 0-9 Units Subcutaneous TID AC   insulin lispro 5 Units Subcutaneous TID With Meals   metoprolol succinate XL 50 mg Oral BID   nitroglycerin 1 inch Topical Q6H   pantoprazole 40 mg Oral QAM   sodium chloride 10 mL Intravenous Q12H       sodium chloride 50 mL/hr Last Rate: 50 mL/hr (05/19/20 1325)       Medication Review:   Current Facility-Administered Medications   Medication Dose Route Frequency Provider Last Rate Last Dose   • aspirin chewable tablet 81 mg  81 mg Oral Daily Trae Kemp MD   81 mg at 05/19/20 0802   • atorvastatin (LIPITOR) tablet 20 mg  20 mg Oral Daily Trae Kemp MD   20 mg at 05/19/20 0802   • clopidogrel (PLAVIX) tablet 75 mg  75 mg Oral Daily Trae Kemp MD   75 mg at 05/19/20 0802   • dextrose (D50W) 25 g/ 50mL Intravenous Solution 25 g  25 g Intravenous Q15 Min PRN Trae Kemp MD       • dextrose (GLUTOSE) oral gel 15 g  15 g Oral Q15 Min PRN Trae Kemp MD       • DULoxetine (CYMBALTA) DR capsule 20 mg  20 mg Oral Daily Trae Kemp MD   20 mg at 05/19/20 0802   • enoxaparin (LOVENOX) syringe 40 mg  40 mg Subcutaneous Q24H Trae Kemp MD   40 mg at 05/19/20 1734   • glipizide (GLUCOTROL) tablet 10 mg  10 mg Oral BID AC Trae Kemp MD   10 mg at 05/19/20 1734   • glucagon (human recombinant) (GLUCAGEN DIAGNOSTIC) injection 1 mg  1 mg Subcutaneous Q15 Min  PRN Trae Kemp MD       • HYDROcodone-acetaminophen (NORCO) 7.5-325 MG per tablet 1 tablet  1 tablet Oral Q6H PRN Trae Kemp MD       • insulin glargine (LANTUS) injection 55 Units  55 Units Subcutaneous Nightly Karolyn Hill MD       • insulin lispro (humaLOG) injection 0-9 Units  0-9 Units Subcutaneous TID AC Trae Kemp MD       • insulin lispro (humaLOG) injection 5 Units  5 Units Subcutaneous TID With Meals Karolyn Hill MD       • metoprolol succinate XL (TOPROL-XL) 24 hr tablet 50 mg  50 mg Oral BID Trae Kemp MD   50 mg at 05/19/20 0802   • nitroglycerin (NITROSTAT) ointment 1 inch  1 inch Topical Q6H Trae Kemp MD   1 inch at 05/19/20 1347   • ondansetron (ZOFRAN) injection 4 mg  4 mg Intravenous Q6H PRN Trae Kemp MD       • pantoprazole (PROTONIX) EC tablet 40 mg  40 mg Oral QAM Trae Kemp MD   40 mg at 05/19/20 0644   • sodium chloride 0.9 % flush 10 mL  10 mL Intravenous PRN Trae Kemp MD       • sodium chloride 0.9 % flush 10 mL  10 mL Intravenous Q12H Trae Kemp MD   10 mL at 05/19/20 0802   • sodium chloride 0.9 % flush 10 mL  10 mL Intravenous PRN Trae Kemp MD       • sodium chloride 0.9 % infusion  50 mL/hr Intravenous Continuous Trae Kemp MD 50 mL/hr at 05/19/20 1325 50 mL/hr at 05/19/20 1325       Assessment/Plan   1. CKD 3.  Creatinine up a little today.  Relative hypotension yesterday.  BP better today.   Off losartan.  Urine not recorded .  2. Unstable angina, CAD sp KENNEDY to LAD.   3. DM2  4. Polycythemia.  Recheck in am .    Anali Otero MD  05/19/20  17:45

## 2020-05-19 NOTE — TELEPHONE ENCOUNTER
Per telephone with yanni at [place] . Pt missed Covid test at [place] today. Has appt for Egd & C scope for 5-21.  Informed Dr. Nuno about pt will cancel Thurs.  procedure.

## 2020-05-20 ENCOUNTER — READMISSION MANAGEMENT (OUTPATIENT)
Dept: CALL CENTER | Facility: HOSPITAL | Age: 70
End: 2020-05-20

## 2020-05-20 VITALS
BODY MASS INDEX: 34.33 KG/M2 | HEIGHT: 64 IN | WEIGHT: 201.06 LBS | RESPIRATION RATE: 16 BRPM | OXYGEN SATURATION: 100 % | SYSTOLIC BLOOD PRESSURE: 150 MMHG | HEART RATE: 68 BPM | DIASTOLIC BLOOD PRESSURE: 75 MMHG | TEMPERATURE: 98.1 F

## 2020-05-20 LAB
ANION GAP SERPL CALCULATED.3IONS-SCNC: 11 MMOL/L (ref 5–15)
BUN BLD-MCNC: 26 MG/DL (ref 8–23)
BUN/CREAT SERPL: 19.3 (ref 7–25)
CALCIUM SPEC-SCNC: 8.6 MG/DL (ref 8.6–10.5)
CHLORIDE SERPL-SCNC: 106 MMOL/L (ref 98–107)
CO2 SERPL-SCNC: 21 MMOL/L (ref 22–29)
CREAT BLD-MCNC: 1.35 MG/DL (ref 0.76–1.27)
DEPRECATED RDW RBC AUTO: 42.1 FL (ref 37–54)
EOSINOPHIL # BLD MANUAL: 2.35 10*3/MM3 (ref 0–0.4)
EOSINOPHIL NFR BLD MANUAL: 18.5 % (ref 0.3–6.2)
ERYTHROCYTE [DISTWIDTH] IN BLOOD BY AUTOMATED COUNT: 13 % (ref 12.3–15.4)
GFR SERPL CREATININE-BSD FRML MDRD: 52 ML/MIN/1.73
GLUCOSE BLD-MCNC: 125 MG/DL (ref 65–99)
GLUCOSE BLDC GLUCOMTR-MCNC: 118 MG/DL (ref 70–130)
GLUCOSE BLDC GLUCOMTR-MCNC: 219 MG/DL (ref 70–130)
HCT VFR BLD AUTO: 49.2 % (ref 37.5–51)
HGB BLD-MCNC: 16.7 G/DL (ref 13–17.7)
LYMPHOCYTES # BLD MANUAL: 3.03 10*3/MM3 (ref 0.7–3.1)
LYMPHOCYTES NFR BLD MANUAL: 23.9 % (ref 19.6–45.3)
LYMPHOCYTES NFR BLD MANUAL: 8.7 % (ref 5–12)
MCH RBC QN AUTO: 30.4 PG (ref 26.6–33)
MCHC RBC AUTO-ENTMCNC: 33.9 G/DL (ref 31.5–35.7)
MCV RBC AUTO: 89.5 FL (ref 79–97)
MONOCYTES # BLD AUTO: 1.1 10*3/MM3 (ref 0.1–0.9)
NEUTROPHILS # BLD AUTO: 6.21 10*3/MM3 (ref 1.7–7)
NEUTROPHILS NFR BLD MANUAL: 48.9 % (ref 42.7–76)
PLAT MORPH BLD: NORMAL
PLATELET # BLD AUTO: 152 10*3/MM3 (ref 140–450)
PMV BLD AUTO: 10.2 FL (ref 6–12)
POTASSIUM BLD-SCNC: 4.8 MMOL/L (ref 3.5–5.2)
RBC # BLD AUTO: 5.5 10*6/MM3 (ref 4.14–5.8)
RBC MORPH BLD: NORMAL
SODIUM BLD-SCNC: 138 MMOL/L (ref 136–145)
WBC MORPH BLD: NORMAL
WBC NRBC COR # BLD: 12.69 10*3/MM3 (ref 3.4–10.8)

## 2020-05-20 PROCEDURE — 63710000001 PANTOPRAZOLE 40 MG TABLET DELAYED-RELEASE: Performed by: INTERNAL MEDICINE

## 2020-05-20 PROCEDURE — A9270 NON-COVERED ITEM OR SERVICE: HCPCS | Performed by: INTERNAL MEDICINE

## 2020-05-20 PROCEDURE — 63710000001 METOPROLOL SUCCINATE XL 50 MG TABLET SUSTAINED-RELEASE 24 HOUR: Performed by: INTERNAL MEDICINE

## 2020-05-20 PROCEDURE — 80048 BASIC METABOLIC PNL TOTAL CA: CPT | Performed by: INTERNAL MEDICINE

## 2020-05-20 PROCEDURE — 93010 ELECTROCARDIOGRAM REPORT: CPT | Performed by: INTERNAL MEDICINE

## 2020-05-20 PROCEDURE — 85025 COMPLETE CBC W/AUTO DIFF WBC: CPT | Performed by: INTERNAL MEDICINE

## 2020-05-20 PROCEDURE — 63710000001 GLIPIZIDE 10 MG TABLET: Performed by: INTERNAL MEDICINE

## 2020-05-20 PROCEDURE — 99217 PR OBSERVATION CARE DISCHARGE MANAGEMENT: CPT | Performed by: INTERNAL MEDICINE

## 2020-05-20 PROCEDURE — 93005 ELECTROCARDIOGRAM TRACING: CPT | Performed by: INTERNAL MEDICINE

## 2020-05-20 PROCEDURE — 63710000001 INSULIN LISPRO (HUMAN) PER 5 UNITS: Performed by: INTERNAL MEDICINE

## 2020-05-20 PROCEDURE — 85007 BL SMEAR W/DIFF WBC COUNT: CPT | Performed by: INTERNAL MEDICINE

## 2020-05-20 PROCEDURE — G0378 HOSPITAL OBSERVATION PER HR: HCPCS

## 2020-05-20 PROCEDURE — 63710000001 CLOPIDOGREL 75 MG TABLET: Performed by: INTERNAL MEDICINE

## 2020-05-20 PROCEDURE — 63710000001 ASPIRIN 81 MG CHEWABLE TABLET: Performed by: INTERNAL MEDICINE

## 2020-05-20 PROCEDURE — 63710000001 DULOXETINE 20 MG CAPSULE DELAYED-RELEASE PARTICLES: Performed by: INTERNAL MEDICINE

## 2020-05-20 PROCEDURE — 82962 GLUCOSE BLOOD TEST: CPT

## 2020-05-20 PROCEDURE — 63710000001 ATORVASTATIN 20 MG TABLET: Performed by: INTERNAL MEDICINE

## 2020-05-20 RX ADMIN — INSULIN LISPRO 5 UNITS: 100 INJECTION, SOLUTION INTRAVENOUS; SUBCUTANEOUS at 11:30

## 2020-05-20 RX ADMIN — ATORVASTATIN CALCIUM 20 MG: 20 TABLET, FILM COATED ORAL at 08:00

## 2020-05-20 RX ADMIN — PANTOPRAZOLE SODIUM 40 MG: 40 TABLET, DELAYED RELEASE ORAL at 06:34

## 2020-05-20 RX ADMIN — DULOXETINE HYDROCHLORIDE 20 MG: 20 CAPSULE, DELAYED RELEASE ORAL at 08:00

## 2020-05-20 RX ADMIN — INSULIN LISPRO 4 UNITS: 100 INJECTION, SOLUTION INTRAVENOUS; SUBCUTANEOUS at 11:29

## 2020-05-20 RX ADMIN — GLIPIZIDE 10 MG: 10 TABLET ORAL at 06:34

## 2020-05-20 RX ADMIN — CLOPIDOGREL 75 MG: 75 TABLET, FILM COATED ORAL at 08:00

## 2020-05-20 RX ADMIN — SODIUM CHLORIDE, PRESERVATIVE FREE 10 ML: 5 INJECTION INTRAVENOUS at 08:01

## 2020-05-20 RX ADMIN — METOPROLOL SUCCINATE 50 MG: 50 TABLET, EXTENDED RELEASE ORAL at 08:00

## 2020-05-20 RX ADMIN — ASPIRIN 81 MG: 81 TABLET, CHEWABLE ORAL at 08:00

## 2020-05-20 RX ADMIN — INSULIN LISPRO 5 UNITS: 100 INJECTION, SOLUTION INTRAVENOUS; SUBCUTANEOUS at 08:00

## 2020-05-20 NOTE — OUTREACH NOTE
Prep Survey      Responses   [facility] facility patient discharged from?  [place]   Is LACE score < 7 ?  No   Eligibility  Readm Mgmt   Discharge diagnosis  Unstable angina,     cardiac catheterization   COVID-19 Test Status  Negative   Does the patient have one of the following disease processes/diagnoses(primary or secondary)?  Other   Does the patient have Home health ordered?  No   Is there a DME ordered?  No   Prep survey completed?  Yes          Polly Escobedo RN

## 2020-05-20 NOTE — DISCHARGE SUMMARY
Hospital Discharge    Patient Name: Jett Flower  Age/Sex: 70 y.o. male  : 1950  MRN: [id number]    Encounter Provider: Augusto Gloria III, MD  Referring Provider: Augusto Gloria III, MD  Place of Service: [facility]  Patient Care Team:  Devon Monroe DO as PCP - General (Family Medicine)         Date of Discharge:  2020   Date of Admit: 2020    Discharge Condition: Good  Discharge Diagnosis:    Unstable angina (CMS/[facility])    Chronic renal failure    Type 2 diabetes mellitus with circulatory disorder, with long-term current use of insulin (CMS/[facility])    Polycythemia      Hospital Course:   Jett Flower is a 70 y.o. male who presented with unstable angina.  He is a 70 year old pt with a history of , HLD, DMHTN, CHF and 2 cardiac stents placed. Pt had cardiologist in [place].  He then presented with crescendo episodes of chest pain shortness of breath consistent with unstable angina pectoris.  Serial troponins in the emergency room were unremarkable.  He does have a history of chronic renal failure, and at 1 point they were apparently having to discuss dialysis.  Creatinine was only mildly increased at 1.36 on arrival.  We had him seen by nephrology who felt it would be safe to proceed with cardiac cath.  Hospital medicine saw him and managed his diabetes.  Hemoglobin was also elevated 18 but improved while he was hospitalized with no therapy and they did not feel any further hospital evaluation was warranted.  He then underwent a cardiac catheterization.  Disease was found in the LAD and a stent was placed.    Nephrology wanted him off of the losartan for now.  This was stopped prior to the cardiac catheterization and held.  He is scheduled to have a BMP performed at his primary care physicians on May 26 as ordered by nephrology.  Predicated on the results, losartan may then be able to be resumed.      Cath  Findings:  1. Coronary Artery  Anatomy:  Dominance: Right  Left Main: Angiographically normal  Left Anterior Descending: Moderate in caliber size.  The proximal segment contains luminal irregularities.  There is a patent stent within the mid segment which contains diffuse moderate 50-60 in-stent restenosis within the proximal segment however in the distal segment there is 70% segment stenoses with YING II flow distally.  The distal LAD contains scattered 50% segment stenoses.  There are 2 small diagonal branches which contain luminal irregularities.  Circumflex Artery: Moderate in caliber size.  Contains a 50% mid segment stenoses.  The first marginal is relatively small in size and contains a 70% ostial segment stenoses.  Right Coronary Artery: Large in caliber size.  Contains luminal irregularities throughout.     2. Hemodynamics:  Left Ventricle: 91/5/8 mmHg  Aorta: 91/52/69 mmHg     3. rFR Assessment:  LAD: 0.86  LCx: 0.93     4. Percutaneous Intervention:  Location: Mid LAD  Treatment: Lesion was predilated with a 2.75 compliant balloon followed by a 3.0 x 10 mm angiosculpt balloon followed by placement of a 2.75 x 33 mm Xience drug-eluting stent which was postdilated with a 3.0 noncompliant balloon in the distal and mid portions and a 3.5 noncompliant balloon in the proximal and mid portions.  Pre-stenosis: 70 %  Post-stenosis: 0 %  Lesion Type C: Yes  YING Flow Pre: 2  YING Flow Post: 3  Bifurcation: No  Severe Calcium: No  Dissection: No     Conclusions:  1. Severe single-vessel coronary artery disease with in-stent restenosis of the mid LAD stent with 70% distal segment stenoses.  Moderate 50% stenoses in the mid circumflex.  70% stenoses of the first marginal.  2. Confirmation of hemodynamic flow-limiting stenosis of the LAD with RFR assessment at 0.86 as well as confirming nonsignificant circumflex stenoses with RFR assessment at 0.93.  3. Successful percutaneous intervention with placement of 2.75 x 33 mm Xience drug-eluting stent  which was postdilated distally with a 3.0 mm noncompliant balloon to 20 mery and a 3.5 mm noncompliant balloon proximally to 16 mery with no residual stenosis and restoration of YING-3 flow.     Recommendations:   1. Patient was loaded with clopidogrel in the Cath Lab and continue with optimal medical therapy for management of his coronary artery disease.  We can reassess his symptoms and consider staged PCI to his marginal if significantly symptomatic however this is a relatively small vessel and may do very well with medical management alone.  2. IV hydration post procedure     Objective:  Temp:  [97.1 °F (36.2 °C)-99.6 °F (37.6 °C)] 98.1 °F (36.7 °C)  Heart Rate:  [66-83] 68  Resp:  [16] 16  BP: ()/(59-89) 150/75    Intake/Output Summary (Last 24 hours) at 5/20/2020 1311  Last data filed at 5/20/2020 1134  Gross per 24 hour   Intake 700 ml   Output 1350 ml   Net -650 ml     Body mass index is 34.51 kg/m².      05/18/20  1624 05/19/20  0630 05/20/20  0615   Weight: 89.5 kg (197 lb 6.4 oz) 90 kg (198 lb 6.4 oz) 91.2 kg (201 lb 1 oz)     Weight change: 0.481 kg (1 lb 1 oz)    Physical Exam:  General Appearance:    Alert, cooperative, in no acute distress   Head:    Normocephalic, without obvious abnormality, atraumatic   Eyes:            Lids and lashes normal, conjunctivae and sclerae normal, no   icterus, no pallor, corneas clear, PERRLA   Ears:    Ears appear intact with no abnormalities noted   Throat:   No oral lesions, no thrush, oral mucosa moist   Neck:   No adenopathy, supple, trachea midline, no thyromegaly, no   carotid bruit, no JVD   Back:     No kyphosis present, no scoliosis present, no skin lesions, erythema or scars, no tenderness to percussion or palpation, range of motion normal   Lungs:     Clear to auscultation,respirations regular, even and unlabored    Heart:    Regular rhythm and normal rate, normal S1 and S2, no murmur, no gallop, no rub, no click   Chest Wall:    No abnormalities  observed   Abdomen:     Normal bowel sounds, no masses, no organomegaly, soft        non-tender, non-distended, no guarding, no rebound  tenderness   Extremities:   Moves all extremities well, no edema, no cyanosis, no redness   Pulses:   Pulses palpable and equal bilaterally. Normal radial, carotid, femoral, dorsalis pedis and posterior tibial pulses bilaterally. Normal abdominal aorta   Skin:  Psychiatric:   No bleeding, bruising or rash    Alert and oriented x 3, normal mood and affect         Procedures Performed  Procedure(s):  Left Heart Cath  Coronary angiography  Left ventriculography  Resting Full Cycle Ratio  Stent KENNEDY coronary  Percutaneous Coronary Intervention       Consults:  Consults     Date and Time Order Name Status Description    5/18/2020 1632 Inpatient Nephrology Consult Completed     5/18/2020 1632 Inpatient Internal Medicine Consult Completed     5/18/2020 1148 LCG (on-call MD unless specified) Completed     5/18/2020 1134 LCG (on-call MD unless specified) Completed           Pertinent Test Results:  Results from last 7 days   Lab Units 05/20/20  0309 05/19/20  0352 05/18/20  1400 05/18/20  1136 05/17/20  1048   SODIUM mmol/L 138 139 139 137 139   POTASSIUM mmol/L 4.8 4.5 5.1 4.8 5.0   CHLORIDE mmol/L 106 105 103 100 102   CO2 mmol/L 21.0* 20.9* 20.7* 23.7 23.4   BUN mg/dL 26* 31* 23 23 25*   CREATININE mg/dL 1.35* 1.69* 1.33* 1.36* 1.30*   GLUCOSE mg/dL 125* 106* 120* 164* 182*   CALCIUM mg/dL 8.6 8.5* 9.2 9.5 9.7   AST (SGOT) U/L  --   --   --  15 13   ALT (SGPT) U/L  --   --   --  12 11     Results from last 7 days   Lab Units 05/18/20  1830 05/18/20  1654 05/18/20  1400 05/18/20  1136 05/17/20  1048   TROPONIN T ng/mL <0.010 <0.010 <0.010 <0.010 <0.010     Results from last 7 days   Lab Units 05/20/20  0309 05/18/20  1136 05/17/20  1048   WBC 10*3/mm3 12.69* 12.88* 8.49   HEMOGLOBIN g/dL 16.7 18.0* 16.1   HEMATOCRIT % 49.2 52.4* 50.7   PLATELETS 10*3/mm3 152 170 160     Results from last 7  days   Lab Units 05/18/20  1136   INR  1.03     Results from last 7 days   Lab Units 05/19/20  0352   MAGNESIUM mg/dL 1.8           Invalid input(s): LDLCALC            Discharge Medications     Discharge Medications      Continue These Medications      Instructions Start Date   aspirin 81 MG chewable tablet   81 mg, Oral, Daily      clopidogrel 75 MG tablet  Commonly known as:  PLAVIX   75 mg, Oral, Daily      DULoxetine 20 MG capsule  Commonly known as:  CYMBALTA   20 mg, Oral, Daily      glipizide 10 MG tablet  Commonly known as:  GLUCOTROL   10 mg, Oral, 2 Times Daily Before Meals      HYDROcodone-acetaminophen 7.5-325 MG per tablet  Commonly known as:  NORCO   1 tablet, Oral, Every 6 Hours PRN      insulin glargine 100 UNIT/ML injection  Commonly known as:  LANTUS   52 Units, Subcutaneous, Daily      insulin lispro 100 UNIT/ML injection  Commonly known as:  humaLOG   Subcutaneous, 3 Times Daily Before Meals      isosorbide dinitrate 10 MG tablet  Commonly known as:  ISORDIL   30 mg, Oral, Daily      losartan 50 MG tablet  Commonly known as:  COZAAR   100 mg, Oral, Daily      metFORMIN  MG 24 hr tablet  Commonly known as:  GLUCOPHAGE-XR   750 mg, Oral, 2 Times Daily      metoprolol succinate XL 50 MG 24 hr tablet  Commonly known as:  TOPROL-XL   50 mg, Oral, 2 Times Daily      omeprazole 40 MG capsule  Commonly known as:  priLOSEC   40 mg, Oral, Daily      Synjardy 5-1000 MG tablet  Generic drug:  Empagliflozin-metFORMIN HCl   Oral      Zocor 40 MG tablet  Generic drug:  simvastatin   40 mg, Oral, Nightly             Discharge Diet:    Dietary Orders (From admission, onward)     Start     Ordered    05/19/20 1800  Diet Regular; Cardiac, Consistent Carbohydrate  Diet Effective Now     Question Answer Comment   Diet Texture / Consistency Regular    Common Modifiers Cardiac    Common Modifiers Consistent Carbohydrate        05/19/20 2784                Activity at Discharge:       Discharge disposition: home      Discharge Instructions and Follow ups:  Future Appointments   Date Time Provider Department Center   6/1/2020 10:30 AM Teresa Rucker APRN [facility] None     Additional Instructions for the Follow-ups that You Need to Schedule     Ambulatory Referral to Cardiac Rehab   As directed        Follow-up Information     Trae Kemp MD Follow up.    Specialty:  Cardiology  Contact information:  [address]  [address]  [facility] [id number] [phone]             [facility] .    Specialty:  Cardiac Rehabilitation  Contact information:  [address] [address] [phone]           Devon Monroe, DO. Go on 5/26/2020.    Specialty:  Family Medicine  Why:  @ 11 AM  Please drive to the back of the building and call when you arrive.   CELESTE Leigh will come to your car to see you.  Contact information:  [address] [place] [address] [phone]                   Test Results Pending at Discharge:      Augusto Gloria III, MD  05/20/20  13:11    Time: Discharge 20 min    EMR Dragon/Transcription disclaimer:   Much of this encounter note is an electronic transcription/translation of spoken language to printed text. The electronic translation of spoken language may permit erroneous, or at times, nonsensical words or phrases to be inadvertently transcribed; Although I have reviewed the note for such errors, some may still exist.

## 2020-05-20 NOTE — PROGRESS NOTES
Name: Jett Flower ADMIT: 2020   : 1950  PCP: Devon Monroe DO    MRN: [id number] LOS: 1 days   AGE/SEX: 70 y.o. male  ROOM: /     Subjective   Subjective   No complaints. Hoping to be discharged today.     Review of Systems   Constitutional: Negative.    HENT: Negative.    Respiratory: Negative.    Cardiovascular: Negative.    Gastrointestinal: Negative.    Genitourinary: Negative.    Musculoskeletal: Negative.    Skin: Negative.    Neurological: Negative.    Psychiatric/Behavioral: Negative.         Objective   Objective   Vital Signs  Temp:  [97.1 °F (36.2 °C)-99.6 °F (37.6 °C)] 97.5 °F (36.4 °C)  Heart Rate:  [57-83] 68  Resp:  [16] 16  BP: ()/(59-89) 127/89  SpO2:  [93 %-96 %] 96 %  on   ;   Device (Oxygen Therapy): room air  Body mass index is 34.51 kg/m².  Physical Exam   Constitutional: He is oriented to person, place, and time. No distress.   HENT:   Head: Normocephalic.   Eyes: Conjunctivae are normal.   Neck: Neck supple.   Cardiovascular: Normal rate and regular rhythm.   Pulmonary/Chest: Effort normal and breath sounds normal. No respiratory distress.   Abdominal: Soft. Bowel sounds are normal.   Musculoskeletal: He exhibits no edema.   Neurological: He is alert and oriented to person, place, and time.   Skin: Skin is warm and dry.   Psychiatric: He has a normal mood and affect.   Vitals reviewed.      Results Review:       I reviewed the patient's new clinical results.  Results from last 7 days   Lab Units 20  0309 20  1136 20  1048   WBC 10*3/mm3 12.69* 12.88* 8.49   HEMOGLOBIN g/dL 16.7 18.0* 16.1   PLATELETS 10*3/mm3 152 170 160     Results from last 7 days   Lab Units 20  0309 20  0352 20  1400 20  1136   SODIUM mmol/L 138 139 139 137   POTASSIUM mmol/L 4.8 4.5 5.1 4.8   CHLORIDE mmol/L 106 105 103 100   CO2 mmol/L 21.0* 20.9* 20.7* 23.7   BUN mg/dL 26* 31* 23 23   CREATININE mg/dL 1.35* 1.69* 1.33* 1.36*   GLUCOSE mg/dL  125* 106* 120* 164*   Estimated Creatinine Clearance: 51.9 mL/min (A) (by C-G formula based on SCr of 1.35 mg/dL (H)).  Results from last 7 days   Lab Units 20  1136 20  1048   ALBUMIN g/dL 4.40 4.00   BILIRUBIN mg/dL 0.6 0.5   ALK PHOS U/L 83 78   AST (SGOT) U/L 15 13   ALT (SGPT) U/L 12 11     Results from last 7 days   Lab Units 20  0309 20  0352 20  1400 20  1136 20  1048   CALCIUM mg/dL 8.6 8.5* 9.2 9.5 9.7   ALBUMIN g/dL  --   --   --  4.40 4.00   MAGNESIUM mg/dL  --  1.8  --   --   --        Hemoglobin A1C   Date/Time Value Ref Range Status   2020 1136 9.49 (H) 4.80 - 5.60 % Final     Glucose   Date/Time Value Ref Range Status   2020 1119 219 (H) 70 - 130 mg/dL Final   2020 0535 118 70 - 130 mg/dL Final   2020 2035 169 (H) 70 - 130 mg/dL Final   2020 1542 136 (H) 70 - 130 mg/dL Final   2020 1032 91 70 - 130 mg/dL Final   2020 0623 99 70 - 130 mg/dL Final   2020 2111 135 (H) 70 - 130 mg/dL Final       Cardiac Catheterization/Vascular Study  [facility]   CARDIAC CATHETERIZATION PROCEDURE REPORT    Patient: Jett Flower  : 1950  MRN: [id number]  Procedure Date: 20    Referring Physician:   Augusto Gloria MD    Interventional Cardiologist:   Trae Kemp MD    Indication:  1. Unstable angina    Clinical Presentation:  Mr. Flower is a 70-year-old gentleman with past medical history   notable for coronary artery disease status post percutaneous intervention,   hypertension, mixed hyperlipidemia, and diabetes who presents to the   hospital with symptoms concerning for unstable angina.  Initially he was   planning on being sent home to follow-up as an outpatient but had   recurrent symptoms and thus re-presented to the hospital.  Given his high   risk presentation he was referred for cardiac catheterization.    Procedure performed:  1. Diagnostic Left Heart Catheterization  2. Coronary  Angiography  3. Drug eluting stent to LAD  4. RFR LAD   5. RFR circumflex     Access Site(s):  1. Right radial artery    Findings:  1. Coronary Artery Anatomy:  Dominance: Right  Left Main: Angiographically normal  Left Anterior Descending: Moderate in caliber size.  The proximal segment   contains luminal irregularities.  There is a patent stent within the mid   segment which contains diffuse moderate 50-60 in-stent restenosis within   the proximal segment however in the distal segment there is 70% segment   stenoses with YING II flow distally.  The distal LAD contains scattered   50% segment stenoses.  There are 2 small diagonal branches which contain   luminal irregularities.  Circumflex Artery: Moderate in caliber size.  Contains a 50% mid segment   stenoses.  The first marginal is relatively small in size and contains a   70% ostial segment stenoses.  Right Coronary Artery: Large in caliber size.  Contains luminal   irregularities throughout.    2. Hemodynamics:  Left Ventricle: 91/5/8 mmHg  Aorta: 91/52/69 mmHg    3. rFR Assessment:  LAD: 0.86  LCx: 0.93    4. Percutaneous Intervention:  Location: Mid LAD  Treatment: Lesion was predilated with a 2.75 compliant balloon followed by   a 3.0 x 10 mm angiosculpt balloon followed by placement of a 2.75 x 33 mm   Xience drug-eluting stent which was postdilated with a 3.0 noncompliant   balloon in the distal and mid portions and a 3.5 noncompliant balloon in   the proximal and mid portions.  Pre-stenosis: 70 %  Post-stenosis: 0 %  Lesion Type C: Yes  YING Flow Pre: 2  YING Flow Post: 3  Bifurcation: No  Severe Calcium: No  Dissection: No    Conclusions:  1. Severe single-vessel coronary artery disease with in-stent restenosis   of the mid LAD stent with 70% distal segment stenoses.  Moderate 50%   stenoses in the mid circumflex.  70% stenoses of the first marginal.  2. Confirmation of hemodynamic flow-limiting stenosis of the LAD with RFR   assessment at 0.86 as well  as confirming nonsignificant circumflex   stenoses with RFR assessment at 0.93.  3. Successful percutaneous intervention with placement of 2.75 x 33 mm   Xience drug-eluting stent which was postdilated distally with a 3.0 mm   noncompliant balloon to 20 mery and a 3.5 mm noncompliant balloon   proximally to 16 mery with no residual stenosis and restoration of YING-3   flow.    Recommendations:   1. Patient was loaded with clopidogrel in the Cath Lab and continue with   optimal medical therapy for management of his coronary artery disease.  We   can reassess his symptoms and consider staged PCI to his marginal if   significantly symptomatic however this is a relatively small vessel and   may do very well with medical management alone.  2. IV hydration post procedure    Procedure Status:  Urgent    Procedure Details:  Informed consent was obtained with an explanation of the risk and benefits   of the procedure. The patient was brought to the Cardiac Catheterization   Laboratory and was prepped and draped in a standard sterile fashion.   Moderate sedation with Fentanyl and Versed was administered by the   circulating nurse. Lidocaine 2% was used to anesthetize the right radial   artery and a 5/6 Slender sheath was placed.      A TGR catheter was then advanced over a 0.035 guidewire into the ascending   aorta.  This catheter was used to engage the right and left coronary   arteries with diagnostic angiography obtained.  We then exchanged for an   angled pigtail catheter and enter the left ventricle to measure   hemodynamics.      We then exchanged for an EBU 3.5 and engaged the LAD.  A FFR guidewire was   then advanced into the left main and normalized.  We first measured the   RFR of the LAD which was found to be hemodynamically significant.  We then   moved our wire into the circumflex and found this not to be   hemodynamically significant.    We then exchanged for a pro-water guidewire and advanced this down the   LAD.   We predilated the lesion with a 2.75 mm compliant balloon followed   by a 3.0 angioscope balloon to help better prepped the lesion given some   amount of scar tissue.  We then exchanged for a 2.75 x 33 mm Xience   drug-eluting stent and positioned this across the stenoses and deployed   our stent at nominal pressures.  We put our stent balloon back slightly   and postdilated to 16 mery.  We then exchanged for a 3.0 noncompliant   balloon and postdilated the distal and mid segments to 20 mery.  We then   exchanged for a 3.5 mm noncompliant balloon and postdilated at the   proximal mid segments to 16 mery.  We then removed our guidewire balloons   and took our final angiogram which demonstrated no residual stenoses and   restoration of YING-3 flow distally.    The catheter was then removed over a guidewire. The radial artery sheath   was removed without difficulty and TR Band was placed over the access site   with excellent hemostasis.     Patient tolerated the procedure well without any complications, and   transferred to the post procedure area for recovery in a stable condition.    Complications:  None.    Estimated Blood Loss:  Minimal.    Trae Kemp MD  [facility]  05/19/20  10:16        aspirin 81 mg Oral Daily   atorvastatin 20 mg Oral Daily   clopidogrel 75 mg Oral Daily   DULoxetine 20 mg Oral Daily   enoxaparin 40 mg Subcutaneous Q24H   glipizide 10 mg Oral BID AC   insulin glargine 55 Units Subcutaneous Nightly   insulin lispro 0-9 Units Subcutaneous TID AC   insulin lispro 5 Units Subcutaneous TID With Meals   metoprolol succinate XL 50 mg Oral BID   nitroglycerin 1 inch Topical Q6H   pantoprazole 40 mg Oral QAM   sodium chloride 10 mL Intravenous Q12H      Diet Regular; Cardiac, Consistent Carbohydrate       Assessment/Plan     Active Hospital Problems    Diagnosis  POA   • **Unstable angina (CMS/[facility]) [I20.0]  Yes   • Chronic renal failure [N18.9]  Yes   • Type 2 diabetes mellitus  with circulatory disorder, with long-term current use of insulin (CMS/[facility]) [E11.59, Z79.4]  Not Applicable   • Polycythemia [D75.1]  Unknown      Resolved Hospital Problems    Diagnosis Date Resolved POA   • Nausea and vomiting [R11.2] 05/19/2020 Yes       70 y.o. male admitted with Unstable angina (CMS/[facility]).    Chest pain:  -S/P cardiac cath w/drug eluting stent to LAD  -Plavix, ASA, statin     DM II w/long term use of insulin:  -A1C 9.49%  -Home regimen: Lantus, Humalog, glipizide, metformin XR  -Glipizide dc'd; Lantus increased  -Monitor BG trends; acceptable at this time  -Resume current regimen at discharge; pt will f/u PCP     Polycythemia:  -Hgb 18 on admission; down to 16 today  -Can be further evaluated as outpatient if needed     CKD:  -Nephrology following  -Off losartan     CELESTE Delgadillo  [facility]  05/20/20  11:29

## 2020-05-20 NOTE — PROGRESS NOTES
"   LOS: 1 day    Patient Care Team:  Devon Monroe DO as PCP - General (Family Medicine)    Chief Complaint:    Chief Complaint   Patient presents with   • Chest Pain     Follow UP CKD3  Subjective     Interval History:   SP LAD KENNEDY yesterday.  Urinating . No chest pain.   Bowels moving .  Not soa.  No pain .    Review of Systems:   See above .    Objective     Vital Signs  Temp:  [97.1 °F (36.2 °C)-99.6 °F (37.6 °C)] 97.5 °F (36.4 °C)  Heart Rate:  [57-83] 68  Resp:  [16-18] 16  BP: ()/(59-89) 127/89    Flowsheet Rows      First Filed Value   Admission Height  162.6 cm (64\") Documented at 05/18/2020 1149   Admission Weight  90.7 kg (200 lb) Documented at 05/18/2020 1149          I/O this shift:  In: 290 [P.O.:290]  Out: 300 [Urine:300]  I/O last 3 completed shifts:  In: 850 [P.O.:50; I.V.:800]  Out: 1050 [Urine:1050]    Intake/Output Summary (Last 24 hours) at 5/20/2020 1011  Last data filed at 5/20/2020 0805  Gross per 24 hour   Intake 340 ml   Output 1350 ml   Net -1010 ml       Physical Exam:  Awake, alert. Conversant  Oral mucosa moist  Neck no jvd  Heart RRR no s3 or rub  Lungs clear to auscultation  Abd + bs, soft, nontender  Ext no edema  Right radial site intact.   Skin no rash .  Neuro oriented. Speech fluent. Moves all 4 ext .      Results Review:    Results from last 7 days   Lab Units 05/20/20  0309 05/19/20  0352 05/18/20  1400 05/18/20  1136 05/17/20  1048   SODIUM mmol/L 138 139 139 137 139   POTASSIUM mmol/L 4.8 4.5 5.1 4.8 5.0   CHLORIDE mmol/L 106 105 103 100 102   CO2 mmol/L 21.0* 20.9* 20.7* 23.7 23.4   BUN mg/dL 26* 31* 23 23 25*   CREATININE mg/dL 1.35* 1.69* 1.33* 1.36* 1.30*   CALCIUM mg/dL 8.6 8.5* 9.2 9.5 9.7   BILIRUBIN mg/dL  --   --   --  0.6 0.5   ALK PHOS U/L  --   --   --  83 78   ALT (SGPT) U/L  --   --   --  12 11   AST (SGOT) U/L  --   --   --  15 13   GLUCOSE mg/dL 125* 106* 120* 164* 182*       Estimated Creatinine Clearance: 51.9 mL/min (A) (by C-G formula based on SCr " of 1.35 mg/dL (H)).    Results from last 7 days   Lab Units 05/19/20  0352   MAGNESIUM mg/dL 1.8             Results from last 7 days   Lab Units 05/20/20  0309 05/18/20  1136 05/17/20  1048   WBC 10*3/mm3 12.69* 12.88* 8.49   HEMOGLOBIN g/dL 16.7 18.0* 16.1   PLATELETS 10*3/mm3 152 170 160       Results from last 7 days   Lab Units 05/18/20  1136   INR  1.03         Imaging Results (Last 24 Hours)     ** No results found for the last 24 hours. **          aspirin 81 mg Oral Daily   atorvastatin 20 mg Oral Daily   clopidogrel 75 mg Oral Daily   DULoxetine 20 mg Oral Daily   enoxaparin 40 mg Subcutaneous Q24H   glipizide 10 mg Oral BID AC   insulin glargine 55 Units Subcutaneous Nightly   insulin lispro 0-9 Units Subcutaneous TID AC   insulin lispro 5 Units Subcutaneous TID With Meals   metoprolol succinate XL 50 mg Oral BID   nitroglycerin 1 inch Topical Q6H   pantoprazole 40 mg Oral QAM   sodium chloride 10 mL Intravenous Q12H          Medication Review:   Current Facility-Administered Medications   Medication Dose Route Frequency Provider Last Rate Last Dose   • aspirin chewable tablet 81 mg  81 mg Oral Daily Trae Kemp MD   81 mg at 05/20/20 0800   • atorvastatin (LIPITOR) tablet 20 mg  20 mg Oral Daily Trae Kemp MD   20 mg at 05/20/20 0800   • clopidogrel (PLAVIX) tablet 75 mg  75 mg Oral Daily Trae Kemp MD   75 mg at 05/20/20 0800   • dextrose (D50W) 25 g/ 50mL Intravenous Solution 25 g  25 g Intravenous Q15 Min PRN Trae Kemp MD       • dextrose (GLUTOSE) oral gel 15 g  15 g Oral Q15 Min PRN Trae Kemp MD       • DULoxetine (CYMBALTA) DR capsule 20 mg  20 mg Oral Daily Trae Kemp MD   20 mg at 05/20/20 0800   • enoxaparin (LOVENOX) syringe 40 mg  40 mg Subcutaneous Q24H Trae Kemp MD   40 mg at 05/19/20 1734   • glipizide (GLUCOTROL) tablet 10 mg  10 mg Oral BID AC Justo, Trae W, MD   10 mg at 05/20/20 0634   • glucagon (human recombinant) (GLUCAGEN  DIAGNOSTIC) injection 1 mg  1 mg Subcutaneous Q15 Min PRN Trae Kemp MD       • HYDROcodone-acetaminophen (NORCO) 7.5-325 MG per tablet 1 tablet  1 tablet Oral Q6H PRN Trae Kemp MD       • insulin glargine (LANTUS) injection 55 Units  55 Units Subcutaneous Nightly Karolyn Hill MD   55 Units at 05/19/20 2043   • insulin lispro (humaLOG) injection 0-9 Units  0-9 Units Subcutaneous TID AC Trae Kemp MD       • insulin lispro (humaLOG) injection 5 Units  5 Units Subcutaneous TID With Meals Karolyn Hill MD   5 Units at 05/20/20 0800   • metoprolol succinate XL (TOPROL-XL) 24 hr tablet 50 mg  50 mg Oral BID Trae Kemp MD   50 mg at 05/20/20 0800   • nitroglycerin (NITROSTAT) ointment 1 inch  1 inch Topical Q6H Trae Kemp MD   1 inch at 05/19/20 1347   • ondansetron (ZOFRAN) injection 4 mg  4 mg Intravenous Q6H PRN Trae Kemp MD       • pantoprazole (PROTONIX) EC tablet 40 mg  40 mg Oral QAM Trae Kemp MD   40 mg at 05/20/20 0634   • sodium chloride 0.9 % flush 10 mL  10 mL Intravenous PRN Trae Kemp MD       • sodium chloride 0.9 % flush 10 mL  10 mL Intravenous Q12H Trae Kemp MD   10 mL at 05/20/20 0801   • sodium chloride 0.9 % flush 10 mL  10 mL Intravenous PRN Trae Kemp MD           Assessment/Plan   1. CKD 3.  Creatinine better.    BP stable off losartan. Ok with renal for dc when ok with cardiology.   2. Unstable angina, CAD sp KENNEDY to LAD.   3. DM2  4. Polycythemia. Hg 16.     If DC needs BMP on Tuesday May 26 at PCP office .    Anali Otero MD  05/20/20  10:11

## 2020-05-20 NOTE — PROGRESS NOTES
Pharmacy Services: Unstable Angina Medication Review    Jett Flower is s/p PCI with drug-eluting stent placement for unstable angina. Pharmacy to review discharge medications to make sure appropriate medications have been prescribed.    Patient has been discharged on the following:  · P2Y12 Inhibitor: Clopidogrel 75 mg daily  · Aspirin 81 mg daily  · Medium-intensity Statin:   · Beta-blocker: Metoprolol XL 50 mg BID    Patient has been on Simvastatin for a long time and does not wish to be changed to another type of statin. Since the FDA no longer recommends a dose of Simvastatin 80 mg (which would be high-intensity) due to risks outweighing benefits, patient now takes 40 mg of drug.    These medications meet the requirements for NCDR discharge medication for chest pain and MI.    Marga Albarado, Pharm.D., [facility]  Clinical Staff Pharmacist

## 2020-05-21 ENCOUNTER — READMISSION MANAGEMENT (OUTPATIENT)
Dept: CALL CENTER | Facility: HOSPITAL | Age: 70
End: 2020-05-21

## 2020-05-21 LAB
ETHNIC BACKGROUND STATED: 19.7 MIU/ML (ref 2.6–18.5)
REF LAB TEST METHOD: NORMAL

## 2020-05-21 NOTE — OUTREACH NOTE
Medical Week 1 Survey      Responses   [facility]   COVID-19 Test Status  Negative   Does the patient have one of the following disease processes/diagnoses(primary or secondary)?  Other   Is there a successful TCM telephone encounter documented?  No   Week 1 attempt successful?  Yes   Call start time  1131   Call end time  1133   Discharge diagnosis  Unstable angina,     cardiac catheterization   Is patient permission given to speak with other caregiver?  Yes   List who call center can speak with  daughter   Person spoke with today (if not patient) and relationship  daughter   Meds reviewed with patient/caregiver?  Yes   Is the patient having any side effects they believe may be caused by any medication additions or changes?  No   Does the patient have all medications ordered at discharge?  Yes   Is the patient taking all medications as directed (includes completed medication regime)?  Yes   Does the patient have a primary care provider?   Yes   Does the patient have an appointment with their PCP within 7 days of discharge?  Yes   Has the patient kept scheduled appointments due by today?  N/A   Has home health visited the patient within 72 hours of discharge?  N/A   Pulse Ox monitoring  None   Psychosocial issues?  No   Did the patient receive a copy of their discharge instructions?  Yes   What is the patient's perception of their health status since discharge?  Improving   Is the patient/caregiver able to teach back signs and symptoms related to disease process for when to call PCP?  Yes   Is the patient/caregiver able to teach back signs and symptoms related to disease process for when to call 911?  Yes   Is the patient/caregiver able to teach back the hierarchy of who to call/visit for symptoms/problems? PCP, Specialist, Home health nurse, Urgent Care, ED, 911  Yes   Week 1 call completed?  Yes   Graduated  Yes   Did the patient feel the follow up calls were helpful  during their recovery period?  Yes   Was the number of calls appropriate?  Yes   Graduated/Revoked comments  Doing well.  No problems or questions at this time.          Lu Keating RN

## 2020-06-01 ENCOUNTER — OFFICE VISIT (OUTPATIENT)
Dept: CARDIOLOGY | Facility: CLINIC | Age: 70
End: 2020-06-01

## 2020-06-01 ENCOUNTER — TELEPHONE (OUTPATIENT)
Dept: CARDIOLOGY | Facility: CLINIC | Age: 70
End: 2020-06-01

## 2020-06-01 VITALS — BODY MASS INDEX: 34.15 KG/M2 | WEIGHT: 200 LBS | HEIGHT: 64 IN

## 2020-06-01 DIAGNOSIS — R53.83 OTHER FATIGUE: ICD-10-CM

## 2020-06-01 DIAGNOSIS — R06.09 DYSPNEA ON EXERTION: ICD-10-CM

## 2020-06-01 DIAGNOSIS — N18.30 CHRONIC RENAL FAILURE, STAGE 3 (MODERATE) (HCC): ICD-10-CM

## 2020-06-01 DIAGNOSIS — I25.10 CORONARY ARTERY DISEASE INVOLVING NATIVE CORONARY ARTERY OF NATIVE HEART WITHOUT ANGINA PECTORIS: Primary | ICD-10-CM

## 2020-06-01 PROBLEM — I20.0 UNSTABLE ANGINA (HCC): Status: RESOLVED | Noted: 2020-05-18 | Resolved: 2020-06-01

## 2020-06-01 PROCEDURE — 99441 PR PHYS/QHP TELEPHONE EVALUATION 5-10 MIN: CPT | Performed by: NURSE PRACTITIONER

## 2020-06-01 NOTE — TELEPHONE ENCOUNTER
Received lab work (CMP). Per :     Stay off losartan, Keep his appointment with nephrology.    Pt is aware.    Thanks Karen

## 2020-06-01 NOTE — PROGRESS NOTES
Telehealth Visit    Date of Visit: 2020  Encounter Provider: CELESTE Wade  Place of Service: [facility] CARDIOLOGY  Patient Name: Jett Flower  :1950  Primary Cardiologist: Dr. Augusto Gloria    Chief Complaint   Patient presents with   • Coronary Artery Disease   • Shortness of Breath   :     Dear Dr. Devon Monroe,     HPI: Jett Flower is a pleasant 70 y.o. male who is an newly established patient of our practice. Due to COVID-19 virus, I am conducting a telehealth visit via telephone with patient and he has consented to this visit today. He is a new patient to me and his previous records have been reviewed.    He has a known history of hypertension, hyperlipidemia, renal insufficiency, and type 2 diabetes mellitus.  In , he was diagnosed with coronary artery disease and had a stent placed in [place].  Details unknown.    In May 2020, he presented to the [facility] ED with chest discomfort on 2 occasions.  He ruled out for ACS with normal troponin and his hemoglobin was elevated.  He underwent cardiac catheterization and received a drug-eluting stent to the LAD.  Nephrology stopped his losartan because of the renal insufficiency prior to the catheterization.    He states that he is not feeling much better since his hospitalization.  He is just very fatigued and does not feel like doing any activity.  He has shortness of breath and denies any lung condition other than possibly having asbestos.  His chest pain has completely resolved since the stent was placed.  Able to check his blood pressure and heart rate.  He said he had blood work completed at his PCP office this past Friday and was told that everything was normal.    Past Medical History:   Diagnosis Date   • Congestive cardiac failure (CMS/HCC)    • Coronary artery disease    • Diabetes mellitus (CMS/HCC)    • Heart disease    • Hyperlipidemia    • Hypertension        Past Surgical History:    Procedure Laterality Date   • CARDIAC CATHETERIZATION N/A 5/19/2020    Procedure: Left Heart Cath;  Surgeon: Trae Kemp MD;  Location:  [facility] CATH INVASIVE LOCATION;  Service: Cardiovascular;  Laterality: N/A;   • CARDIAC CATHETERIZATION N/A 5/19/2020    Procedure: Coronary angiography;  Surgeon: Trae Kemp MD;  Location:  [facility] CATH INVASIVE LOCATION;  Service: Cardiovascular;  Laterality: N/A;   • CARDIAC CATHETERIZATION N/A 5/19/2020    Procedure: Left ventriculography;  Surgeon: Trae Kemp MD;  Location:  [facility] CATH INVASIVE LOCATION;  Service: Cardiovascular;  Laterality: N/A;   • CARDIAC CATHETERIZATION N/A 5/19/2020    Procedure: Resting Full Cycle Ratio;  Surgeon: Trae Kemp MD;  Location:  [facility] CATH INVASIVE LOCATION;  Service: Cardiovascular;  Laterality: N/A;   • CARDIAC CATHETERIZATION N/A 5/19/2020    Procedure: Stent KENNEDY coronary;  Surgeon: Trae Kemp MD;  [facility];  Service: Cardiovascular;  Laterality: N/A;   • CARDIAC CATHETERIZATION N/A 5/19/2020    Procedure: Percutaneous Coronary Intervention;  Surgeon: Trae Kemp MD;  Location: [facility] CATH INVASIVE LOCATION;  Service: Cardiovascular;  Laterality: N/A;   • CARDIAC SURGERY      x2   • CERVICAL SPINE SURGERY         Social History     Socioeconomic History   • Marital status:      Spouse name: Not on file   • Number of children: Not on file   • Years of education: Not on file   • Highest education level: Not on file   Tobacco Use   • Smoking status: Never Smoker   • Smokeless tobacco: Never Used   Substance and Sexual Activity   • Alcohol use: Not Currently     Frequency: Never     Comment: Caffeine use: 1-2 cokes daily   • Drug use: Never   • Sexual activity: Defer       Family History   Problem Relation Age of Onset   • Heart attack Mother    • Emphysema Father    • Heart attack Father    • Stroke Father    • Arthritis Father    • Arthritis Sister    • Sudden  death Brother         shot   • No Known Problems Daughter    • No Known Problems Son    • No Known Problems Maternal Grandmother    • No Known Problems Maternal Grandfather    • Emphysema Paternal Grandmother    • Diabetes Paternal Grandfather    • No Known Problems Sister    • No Known Problems Sister    • No Known Problems Sister    • No Known Problems Sister    • No Known Problems Sister    • No Known Problems Daughter    • No Known Problems Daughter        The following portion of the patient's history were reviewed and updated as appropriate: past medical history, past surgical history, past social history, past family history, allergies, current medications, and problem list.    Review of Systems   Constitution: Positive for malaise/fatigue.   Cardiovascular: Positive for dyspnea on exertion. Negative for chest pain.   Respiratory: Positive for shortness of breath.    Hematologic/Lymphatic: Negative for bleeding problem.   Neurological: Negative.        No Known Allergies      Current Outpatient Medications:   •  aspirin 81 MG chewable tablet, Chew 81 mg Daily., Disp: , Rfl:   •  clopidogrel (PLAVIX) 75 MG tablet, Take 75 mg by mouth Daily., Disp: , Rfl:   •  DULoxetine (CYMBALTA) 20 MG capsule, Take 20 mg by mouth Daily., Disp: , Rfl:   •  Empagliflozin-metFORMIN HCl (Synjardy) 5-1000 MG tablet, Take  by mouth Daily., Disp: , Rfl:   •  glipizide (GLUCOTROL) 10 MG tablet, Take 10 mg by mouth 2 (Two) Times a Day Before Meals., Disp: , Rfl:   •  HYDROcodone-acetaminophen (NORCO) 7.5-325 MG per tablet, Take 1 tablet by mouth Every 6 (Six) Hours As Needed for Moderate Pain ., Disp: , Rfl:   •  insulin glargine (LANTUS) 100 UNIT/ML injection, Inject 52 Units under the skin into the appropriate area as directed Daily., Disp: , Rfl:   •  insulin lispro (humaLOG) 100 UNIT/ML injection, Inject  under the skin into the appropriate area as directed 3 (Three) Times a Day Before Meals., Disp: , Rfl:   •  isosorbide  "dinitrate (ISORDIL) 30 MG tablet, Take 30 mg by mouth Daily., Disp: , Rfl:   •  metFORMIN ER (GLUCOPHAGE-XR) 750 MG 24 hr tablet, Take 750 mg by mouth Daily., Disp: , Rfl:   •  metoprolol succinate XL (TOPROL-XL) 50 MG 24 hr tablet, Take 50 mg by mouth 2 (Two) Times a Day., Disp: , Rfl:   •  omeprazole (priLOSEC) 40 MG capsule, Take 40 mg by mouth Daily., Disp: , Rfl:   •  losartan (COZAAR) 100 MG tablet, Take 100 mg by mouth Daily., Disp: , Rfl:         Objective:     Vitals:    06/01/20 1424   Weight: 90.7 kg (200 lb)   Height: 162.6 cm (64\")     Body mass index is 34.33 kg/m².    Due to telehealth visit, there was no EKG, vitals, or weight performed in our office.  Vitals/Weight were reported by the patient and conducted at home.       Assessment:       Diagnosis Plan   1. Coronary artery disease involving native coronary artery of native heart without angina pectoris     2. Chronic renal failure, stage 3 (moderate) (CMS/HCC)     3. Dyspnea on exertion     4. Other fatigue            Plan:       1.  Coronary Artery Disease: Status post recent stent placement to the LAD and his chest pain has resolved.  He remains on aspirin, clopidogrel, isosorbide, and metoprolol.  He should be taking his simvastatin and is not doing so.  He was referred to outpatient cardiac rehab and I think this would be very beneficial for him.    2.  Chronic Renal Failure: His losartan was discontinued during his hospitalization.  He was to follow-up with his PCP to see if he could resume the losartan patient.    3/4.  Dyspnea and Fatigue: The symptoms have both remained despite his recent stent placement.  He is unable to do any activity because of these symptoms.    5.  I have recommended that he follow-up in our [place] office next week since he is still not feeling well.    This patient has consented to a telehealth visit via telephone. The visit was scheduled as a telephone visit to comply with patient safety concerns in accordance " with CDC recommendations.  All vitals recorded within this visit are reported by the patient.  I spent 25 minutes in total including but not limited to the 6 minutes spent in direct conversation with this patient.      As always, it has been a pleasure to participate in your patient's care. Thank you.       Sincerely,         CELESTE Morales        Dictated utilizing Dragon dictation

## 2020-06-09 NOTE — PROGRESS NOTES
Date of Office Visit: 06/10/2020  Encounter Provider: CELESTE Snider  Place of Service: [facility] CARDIOLOGY  Patient Name: Jett Flower  :1950  Primary Cardiologist: Dr. Gloria    CC:  Fatigue and SOA    Dear Dr. Monroe    HPI: Jett Flower is a pleasant 70 y.o. male who presents 06/10/2020 for cardiac follow up. HE is a new patient to me and I have reviewed his past medical records.  He is a new patient to Dr. Esquivel.    He has a known history of hypertension, hyperlipidemia, renal insufficiency, and type 2 diabetes mellitus.  In , he was diagnosed with coronary artery disease and had a stent placed in [place].  Details unknown.     In May 2020, he presented to the [facility] ED with chest discomfort on 2 occasions.  He ruled out for ACS with normal troponin and his hemoglobin was elevated.  He underwent cardiac catheterization and received a drug-eluting stent to the LAD.  Nephrology stopped his losartan because of the renal insufficiency prior to the catheterization.     His biggest complaint is lower back pain.  He states he has had back issues since he broke his back many years ago.  He states he has several disc that are compressed.  It is very painful to stand, sit or walk.  He does ambulate with a cane.  He states he had an episode of a dull nonradiating chest pain yesterday that lasted about 1 hour.  He did not take anything for it and it just went away.  He denied any nausea vomiting or diaphoresis with it.  He denies any palpitations, lower extremity edema.  He does have some dizziness intermittently.  He states he is fatigued and simply has no energy.  He still complains of shortness of breath with exertion.  It is noted that with his severe back pain he is very sedentary and deconditioned.  He has not yet started cardiac rehab.  He also states that he saw pulmonology greater than 15 years ago and has a known asbestos exposure.  He states he was  "told that the asbestos is walled off and \"will not kill me\".  He denies any leg pain, numbness or tingling in his upper or lower extremities.  He denies any snoring, PND, cough, orthopnea.  He has known obstructive sleep apnea and is noncompliant with his machine.  He states he wakes up just is tired in the mornings is when he went to bed at night.  He states he is used many masks over the years but none of them seem to fit right.  He states it is been years since he has had a new mask.  He is taking his medications as directed.  He denies any unexplained bleeding, dark or tarry stools.  He states his blood pressure at home is been running in the 130s over 70s/80s.     Past Medical History:   Diagnosis Date   • Congestive cardiac failure (CMS/[facility])    • Coronary artery disease    • Diabetes mellitus (CMS/[facility])    • Heart disease    • Hyperlipidemia    • Hypertension        Past Surgical History:   Procedure Laterality Date   • CARDIAC CATHETERIZATION N/A 5/19/2020    Procedure: Left Heart Cath;  Surgeon: Trae Kemp MD;  Location: [facility] INVASIVE LOCATION;  Service: Cardiovascular;  Laterality: N/A;   • CARDIAC CATHETERIZATION N/A 5/19/2020    Procedure: Coronary angiography;  Surgeon: Trae Kemp MD;  Location: [facility] INVASIVE LOCATION;  Service: Cardiovascular;  Laterality: N/A;   • CARDIAC CATHETERIZATION N/A 5/19/2020    Procedure: Left ventriculography;  Surgeon: Trae Kemp MD;  Location: [facility] INVASIVE LOCATION;  Service: Cardiovascular;  Laterality: N/A;   • CARDIAC CATHETERIZATION N/A 5/19/2020    Procedure: Resting Full Cycle Ratio;  Surgeon: Trae Kemp MD;  Location: [facility] CATH INVASIVE LOCATION;  Service: Cardiovascular;  Laterality: N/A;   • CARDIAC CATHETERIZATION N/A 5/19/2020    Procedure: Stent KENNEDY coronary;  Surgeon: Trae Kemp MD;  Location: [facility] CATH INVASIVE LOCATION;  Service: Cardiovascular;  Laterality: N/A;   • CARDIAC CATHETERIZATION N/A " 5/19/2020    Procedure: Percutaneous Coronary Intervention;  Surgeon: Trae Kemp MD;  Location: [facility] INVASIVE LOCATION;  Service: Cardiovascular;  Laterality: N/A;   • CARDIAC SURGERY      x2   • CERVICAL SPINE SURGERY         Social History     Socioeconomic History   • Marital status:      Spouse name: Not on file   • Number of children: Not on file   • Years of education: Not on file   • Highest education level: Not on file   Tobacco Use   • Smoking status: Never Smoker   • Smokeless tobacco: Never Used   Substance and Sexual Activity   • Alcohol use: Not Currently     Frequency: Never     Comment: Caffeine use: 1-2 cokes daily   • Drug use: Never   • Sexual activity: Defer       Family History   Problem Relation Age of Onset   • Heart attack Mother    • Emphysema Father    • Heart attack Father    • Stroke Father    • Arthritis Father    • Arthritis Sister    • Sudden death Brother         shot   • No Known Problems Daughter    • No Known Problems Son    • No Known Problems Maternal Grandmother    • No Known Problems Maternal Grandfather    • Emphysema Paternal Grandmother    • Diabetes Paternal Grandfather    • No Known Problems Sister    • No Known Problems Sister    • No Known Problems Sister    • No Known Problems Sister    • No Known Problems Sister    • No Known Problems Daughter    • No Known Problems Daughter        The following portion of the patient's history were reviewed and updated as appropriate: past medical history, past surgical history, past social history, past family history, allergies, current medications, and problem list.    Review of Systems   Constitution: Positive for malaise/fatigue. Negative for diaphoresis and fever.   HENT: Negative for congestion, hearing loss, hoarse voice, nosebleeds and sore throat.    Eyes: Negative for photophobia, vision loss in left eye, vision loss in right eye and visual disturbance.   Cardiovascular: Positive for chest pain and  dyspnea on exertion. Negative for irregular heartbeat, leg swelling, near-syncope, orthopnea, palpitations, paroxysmal nocturnal dyspnea and syncope.   Respiratory: Positive for shortness of breath and sleep disturbances due to breathing. Negative for cough, hemoptysis, snoring, sputum production and wheezing.    Endocrine: Negative for cold intolerance, heat intolerance, polydipsia, polyphagia and polyuria.   Hematologic/Lymphatic: Negative for bleeding problem. Does not bruise/bleed easily.   Skin: Negative for color change, dry skin, poor wound healing, rash and suspicious lesions.   Musculoskeletal: Positive for back pain. Negative for arthritis, falls, gout, joint pain, joint swelling, muscle cramps, muscle weakness and myalgias.   Gastrointestinal: Negative for bloating, abdominal pain, constipation, diarrhea, dysphagia, melena, nausea and vomiting.   Neurological: Positive for excessive daytime sleepiness and dizziness. Negative for headaches, light-headedness, loss of balance, numbness, paresthesias, seizures, vertigo and weakness.   Psychiatric/Behavioral: Negative for depression, memory loss and substance abuse. The patient is not nervous/anxious.        No Known Allergies      Current Outpatient Medications:   •  aspirin 81 MG chewable tablet, Chew 81 mg Daily., Disp: , Rfl:   •  clopidogrel (PLAVIX) 75 MG tablet, Take 75 mg by mouth Daily., Disp: , Rfl:   •  DULoxetine (CYMBALTA) 20 MG capsule, Take 20 mg by mouth Daily., Disp: , Rfl:   •  Empagliflozin-metFORMIN HCl (Synjardy) 5-1000 MG tablet, Take  by mouth Daily., Disp: , Rfl:   •  glipizide (GLUCOTROL) 10 MG tablet, Take 10 mg by mouth 2 (Two) Times a Day Before Meals., Disp: , Rfl:   •  HYDROcodone-acetaminophen (NORCO) 7.5-325 MG per tablet, Take 1 tablet by mouth Every 6 (Six) Hours As Needed for Moderate Pain ., Disp: , Rfl:   •  insulin glargine (LANTUS) 100 UNIT/ML injection, Inject 52 Units under the skin into the appropriate area as directed  "Daily., Disp: , Rfl:   •  insulin lispro (humaLOG) 100 UNIT/ML injection, Inject  under the skin into the appropriate area as directed 3 (Three) Times a Day Before Meals., Disp: , Rfl:   •  isosorbide dinitrate (ISORDIL) 30 MG tablet, Take 2 tablets by mouth Daily., Disp: 180 tablet, Rfl: 3  •  metFORMIN ER (GLUCOPHAGE-XR) 750 MG 24 hr tablet, Take 750 mg by mouth Daily., Disp: , Rfl:   •  metoprolol succinate XL (TOPROL-XL) 50 MG 24 hr tablet, Take 50 mg by mouth 2 (Two) Times a Day., Disp: , Rfl:   •  omeprazole (priLOSEC) 40 MG capsule, Take 40 mg by mouth Daily., Disp: , Rfl:   •  simvastatin (ZOCOR) 40 MG tablet, Take 40 mg by mouth Daily., Disp: , Rfl:         Objective:     Vitals:    06/10/20 0933   BP: 140/80   Pulse: 67   Resp: 16   SpO2: 97%   Weight: 89.8 kg (198 lb)   Height: 162.6 cm (64\")     Body mass index is 33.99 kg/m².      Physical Exam   Constitutional: He is oriented to person, place, and time. He appears well-developed and well-nourished. No distress.   HENT:   Head: Normocephalic and atraumatic.   Right Ear: External ear normal.   Left Ear: External ear normal.   Nose: Nose normal.   Eyes: Pupils are equal, round, and reactive to light. Conjunctivae are normal. Right eye exhibits no discharge. Left eye exhibits no discharge.   Neck: Normal range of motion. Neck supple. No JVD present. No tracheal deviation present. No thyromegaly present.   Cardiovascular: Normal rate, regular rhythm, normal heart sounds and intact distal pulses. Exam reveals no gallop and no friction rub.   No murmur heard.  Pulmonary/Chest: Effort normal and breath sounds normal. No respiratory distress. He has no wheezes. He has no rales. He exhibits no tenderness.   Abdominal: Soft. Bowel sounds are normal. He exhibits no distension. There is no tenderness.   Musculoskeletal: He exhibits no edema, tenderness or deformity.   Ambulates with a walking stick, slightly kyphotic, shuffling gait   Lymphadenopathy:     He has no " cervical adenopathy.   Neurological: He is alert and oriented to person, place, and time. Coordination normal.   Skin: Skin is warm and dry. No rash noted. No erythema.   Psychiatric: He has a normal mood and affect. His behavior is normal. Judgment and thought content normal.             ECG 12 Lead  Date/Time: 6/10/2020 9:38 AM  Performed by: Cristina Cavazos APRN  Authorized by: Cristina Cavazos APRN   Comparison: compared with previous ECG from 5/20/2020  Similar to previous ECG  Rhythm: sinus rhythm  Rate: normal  Conduction: conduction normal  ST Elevation: II, III and aVF  T inversion: aVL  QRS axis: normal    Clinical impression: non-specific ECG              Assessment:       Diagnosis Plan   1. Coronary artery disease of native artery of native heart with stable angina pectoris (CMS/HCC)     2. Dyspnea on exertion  Ambulatory Referral to Pulmonology   3. MANDEEP (obstructive sleep apnea)            Plan:         1.  Coronary Artery Disease: Status post recent stent placement to the LAD.  One episode of chest pain this week, non-radiating.  Will increase the Imdur to 60 mg daily. He is on DAPT, BB and statin.  He has been referred to outpatient cardiac rehab.  Hopefully this will help with his deconditioning and overall fatigue.     2.  Chronic Renal Failure: His losartan was discontinued during his hospitalization.  He was to follow-up with his PCP to see if he could resume the losartan patient.     3/4.  Dyspnea and Fatigue: The symptoms have both remained despite his recent stent placement.  He is unable to do any activity because of these symptoms. This is multifactorial from deconditioning, untreated MANDEEP, chronic back pain and possible lung disease from prior asbestos exposure.  Referral to pulmonary.     5.  MANDEEP - untreated, very fatigued and SOA.  Referral to pulmonary    6.  Chronic back pain - wants to have surgery for compressed discs.  I have advised him he cannot stop his DAPT for one year.  I have made  an appt for him with Dr. Gloria  For the fall to discuss possibly stopping for surgery after 6 months of therapy so he could have surgery and then resuming.  I told him this is not ideal and he may be advised against it.  He voiced understanding.    Waiting to get enrolled in cardiac rehab  Pulmonary referral for SOA and untreated MANDEEP  RTO in 4 months with Dr. Gloria.    As always, it has been a pleasure to participate in your patient's care. Thank you.       Sincerely,       CELESTE Snider      Current Outpatient Medications:   •  aspirin 81 MG chewable tablet, Chew 81 mg Daily., Disp: , Rfl:   •  clopidogrel (PLAVIX) 75 MG tablet, Take 75 mg by mouth Daily., Disp: , Rfl:   •  DULoxetine (CYMBALTA) 20 MG capsule, Take 20 mg by mouth Daily., Disp: , Rfl:   •  Empagliflozin-metFORMIN HCl (Synjardy) 5-1000 MG tablet, Take  by mouth Daily., Disp: , Rfl:   •  glipizide (GLUCOTROL) 10 MG tablet, Take 10 mg by mouth 2 (Two) Times a Day Before Meals., Disp: , Rfl:   •  HYDROcodone-acetaminophen (NORCO) 7.5-325 MG per tablet, Take 1 tablet by mouth Every 6 (Six) Hours As Needed for Moderate Pain ., Disp: , Rfl:   •  insulin glargine (LANTUS) 100 UNIT/ML injection, Inject 52 Units under the skin into the appropriate area as directed Daily., Disp: , Rfl:   •  insulin lispro (humaLOG) 100 UNIT/ML injection, Inject  under the skin into the appropriate area as directed 3 (Three) Times a Day Before Meals., Disp: , Rfl:   •  isosorbide dinitrate (ISORDIL) 30 MG tablet, Take 2 tablets by mouth Daily., Disp: 180 tablet, Rfl: 3  •  metFORMIN ER (GLUCOPHAGE-XR) 750 MG 24 hr tablet, Take 750 mg by mouth Daily., Disp: , Rfl:   •  metoprolol succinate XL (TOPROL-XL) 50 MG 24 hr tablet, Take 50 mg by mouth 2 (Two) Times a Day., Disp: , Rfl:   •  omeprazole (priLOSEC) 40 MG capsule, Take 40 mg by mouth Daily., Disp: , Rfl:   •  simvastatin (ZOCOR) 40 MG tablet, Take 40 mg by mouth Daily., Disp: , Rfl:     Dictated utilizing Dragon  dictation

## 2020-06-10 ENCOUNTER — OFFICE VISIT (OUTPATIENT)
Dept: CARDIOLOGY | Facility: CLINIC | Age: 70
End: 2020-06-10

## 2020-06-10 VITALS
HEIGHT: 64 IN | DIASTOLIC BLOOD PRESSURE: 80 MMHG | RESPIRATION RATE: 16 BRPM | OXYGEN SATURATION: 97 % | HEART RATE: 67 BPM | WEIGHT: 198 LBS | BODY MASS INDEX: 33.8 KG/M2 | SYSTOLIC BLOOD PRESSURE: 140 MMHG

## 2020-06-10 DIAGNOSIS — R06.09 DYSPNEA ON EXERTION: ICD-10-CM

## 2020-06-10 DIAGNOSIS — I25.118 CORONARY ARTERY DISEASE OF NATIVE ARTERY OF NATIVE HEART WITH STABLE ANGINA PECTORIS (HCC): ICD-10-CM

## 2020-06-10 DIAGNOSIS — G47.33 OSA (OBSTRUCTIVE SLEEP APNEA): ICD-10-CM

## 2020-06-10 PROCEDURE — 93000 ELECTROCARDIOGRAM COMPLETE: CPT | Performed by: NURSE PRACTITIONER

## 2020-06-10 PROCEDURE — 99214 OFFICE O/P EST MOD 30 MIN: CPT | Performed by: NURSE PRACTITIONER

## 2020-06-10 RX ORDER — ISOSORBIDE DINITRATE 30 MG/1
60 TABLET ORAL DAILY
Qty: 180 TABLET | Refills: 3 | Status: SHIPPED | OUTPATIENT
Start: 2020-06-10 | End: 2021-10-21

## 2020-06-10 RX ORDER — SIMVASTATIN 40 MG
40 TABLET ORAL DAILY
COMMUNITY
Start: 2018-01-30

## 2020-07-13 NOTE — PLAN OF CARE
Problem: Patient Care Overview  Goal: Plan of Care Review  Outcome: Ongoing (interventions implemented as appropriate)  Flowsheets (Taken 5/20/2020 0421)  Progress: improving  Plan of Care Reviewed With: patient  Outcome Summary: VSS. No complaints of pain. R radial site c,d,i, soft. Will continue to monitor.  Goal: Individualization and Mutuality  Outcome: Ongoing (interventions implemented as appropriate)  Goal: Discharge Needs Assessment  Outcome: Ongoing (interventions implemented as appropriate)  Goal: Interprofessional Rounds/Family Conf  Outcome: Ongoing (interventions implemented as appropriate)      no

## 2020-09-17 ENCOUNTER — TRANSCRIBE ORDERS (OUTPATIENT)
Dept: ADMINISTRATIVE | Facility: HOSPITAL | Age: 70
End: 2020-09-17

## 2020-09-17 DIAGNOSIS — R93.89 ABNORMAL CXR: ICD-10-CM

## 2020-09-17 DIAGNOSIS — R06.09 DOE (DYSPNEA ON EXERTION): Primary | ICD-10-CM

## 2020-09-18 ENCOUNTER — TRANSCRIBE ORDERS (OUTPATIENT)
Dept: OBSTETRICS AND GYNECOLOGY | Facility: CLINIC | Age: 70
End: 2020-09-18

## 2020-09-18 ENCOUNTER — TRANSCRIBE ORDERS (OUTPATIENT)
Dept: ADMINISTRATIVE | Facility: HOSPITAL | Age: 70
End: 2020-09-18

## 2020-09-18 DIAGNOSIS — Z01.818 OTHER SPECIFIED PRE-OPERATIVE EXAMINATION: Primary | ICD-10-CM

## 2020-09-18 DIAGNOSIS — R06.09 DOE (DYSPNEA ON EXERTION): Primary | ICD-10-CM

## 2020-09-18 DIAGNOSIS — R93.89 ABNORMAL CXR: ICD-10-CM

## 2020-09-21 ENCOUNTER — LAB (OUTPATIENT)
Dept: LAB | Facility: HOSPITAL | Age: 70
End: 2020-09-21

## 2020-09-21 DIAGNOSIS — Z01.818 OTHER SPECIFIED PRE-OPERATIVE EXAMINATION: ICD-10-CM

## 2020-09-21 PROCEDURE — U0004 COV-19 TEST NON-CDC HGH THRU: HCPCS

## 2020-09-21 PROCEDURE — C9803 HOPD COVID-19 SPEC COLLECT: HCPCS

## 2020-09-22 LAB — SARS-COV-2 RNA NOSE QL NAA+PROBE: NOT DETECTED

## 2020-09-23 ENCOUNTER — HOSPITAL ENCOUNTER (OUTPATIENT)
Dept: CT IMAGING | Facility: HOSPITAL | Age: 70
Discharge: HOME | End: 2020-09-23

## 2020-09-23 ENCOUNTER — HOSPITAL ENCOUNTER (OUTPATIENT)
Dept: PULMONOLOGY | Facility: HOSPITAL | Age: 70
Discharge: HOME | End: 2020-09-23

## 2020-09-23 VITALS — OXYGEN SATURATION: 98 % | RESPIRATION RATE: 16 BRPM | HEART RATE: 75 BPM

## 2020-09-23 DIAGNOSIS — R93.89 ABNORMAL CXR: ICD-10-CM

## 2020-09-23 DIAGNOSIS — R06.09 DOE (DYSPNEA ON EXERTION): ICD-10-CM

## 2020-09-23 PROCEDURE — 94729 DIFFUSING CAPACITY: CPT

## 2020-09-23 PROCEDURE — A9270 NON-COVERED ITEM OR SERVICE: HCPCS | Performed by: INTERNAL MEDICINE

## 2020-09-23 PROCEDURE — 94726 PLETHYSMOGRAPHY LUNG VOLUMES: CPT

## 2020-09-23 PROCEDURE — 63710000001 ALBUTEROL SULFATE HFA 108 (90 BASE) MCG/ACT AEROSOL SOLUTION 8.5 G INHALER: Performed by: INTERNAL MEDICINE

## 2020-09-23 PROCEDURE — 94060 EVALUATION OF WHEEZING: CPT

## 2020-09-23 PROCEDURE — 71250 CT THORAX DX C-: CPT

## 2020-09-23 RX ORDER — ALBUTEROL SULFATE 90 UG/1
4 AEROSOL, METERED RESPIRATORY (INHALATION) ONCE
Status: COMPLETED | OUTPATIENT
Start: 2020-09-23 | End: 2020-09-23

## 2020-09-23 RX ADMIN — ALBUTEROL SULFATE 4 PUFF: 90 AEROSOL, METERED RESPIRATORY (INHALATION) at 15:47

## 2020-10-08 ENCOUNTER — OFFICE VISIT (OUTPATIENT)
Dept: CARDIOLOGY | Facility: CLINIC | Age: 70
End: 2020-10-08

## 2020-10-08 VITALS
HEIGHT: 64 IN | DIASTOLIC BLOOD PRESSURE: 80 MMHG | OXYGEN SATURATION: 98 % | SYSTOLIC BLOOD PRESSURE: 120 MMHG | WEIGHT: 199 LBS | BODY MASS INDEX: 33.97 KG/M2 | HEART RATE: 74 BPM | RESPIRATION RATE: 16 BRPM

## 2020-10-08 DIAGNOSIS — G47.33 OSA (OBSTRUCTIVE SLEEP APNEA): ICD-10-CM

## 2020-10-08 DIAGNOSIS — Z79.4 TYPE 2 DIABETES MELLITUS WITH OTHER CIRCULATORY COMPLICATION, WITH LONG-TERM CURRENT USE OF INSULIN (HCC): Chronic | ICD-10-CM

## 2020-10-08 DIAGNOSIS — E11.59 TYPE 2 DIABETES MELLITUS WITH OTHER CIRCULATORY COMPLICATION, WITH LONG-TERM CURRENT USE OF INSULIN (HCC): Chronic | ICD-10-CM

## 2020-10-08 DIAGNOSIS — I25.118 CORONARY ARTERY DISEASE OF NATIVE ARTERY OF NATIVE HEART WITH STABLE ANGINA PECTORIS (HCC): Primary | ICD-10-CM

## 2020-10-08 DIAGNOSIS — N18.30 CHRONIC RENAL FAILURE, STAGE 3 (MODERATE), UNSPECIFIED WHETHER STAGE 3A OR 3B CKD (HCC): Chronic | ICD-10-CM

## 2020-10-08 DIAGNOSIS — D75.1 POLYCYTHEMIA: ICD-10-CM

## 2020-10-08 PROCEDURE — 99215 OFFICE O/P EST HI 40 MIN: CPT | Performed by: INTERNAL MEDICINE

## 2020-10-08 NOTE — PROGRESS NOTES
Subjective:     Encounter Date:10/08/20      Patient ID: Jett Flower is a 70 y.o. male.    Chief Complaint: CAD  History of Present Illness  Is a pleasant 70-year-old male presents for follow-up.  This is the first time that I have seen him.  He has a history of coronary disease and had drug-eluting stent placed in LAD in May 2020.    He presented the emergency room [facility] with chest discomfort.  He ruled out for acute myocardial infarction with normal troponin.  He underwent cardiac catheterization and showed stenosis in his LAD and then received drug-eluting stent to LAD.  He did have a prior history of coronary disease.  In 2011 he was diagnosed with CAD and had a stent placed in [place] although we have never received those records.    Patient has a known history of hypertension, hyperlipidemia, renal failure chronic, and diabetes mellitus with circulatory complication in addition to his CAD.    Currently his only complaint is back pain.  He was here in June and was stating he went to have surgery done on his back.  We explained that the risk was greatest with holding antiplatelet therapy within the first 6 months after stent placement, then was less for the subsequent 6 months, and minimal after 12 months.  He comes back in to discuss stopping his meds at 6 months and going through with the surgery.    He states he is feeling fine from a cardiac standpoint.  No chest pain, pressure, tightness, squeezing, heartburn.  He never did cardiac rehab because he states that he would not be able to do anything because of his back pain.  No shortness of breath at rest.  No orthopnea or PND.  No chills or fevers.  No loss of taste or smell.  No cough.  No fever.  No exposure to anyone known to have COVID-19.    Cardiac catheterization May 2020:  Findings:  1. Coronary Artery Anatomy:  Dominance: Right  Left Main: Angiographically normal  Left Anterior Descending: Moderate in caliber size.  The proximal  segment contains luminal irregularities.  There is a patent stent within the mid segment which contains diffuse moderate 50-60 in-stent restenosis within the proximal segment however in the distal segment there is 70% segment stenoses with YING II flow distally.  The distal LAD contains scattered 50% segment stenoses.  There are 2 small diagonal branches which contain luminal irregularities.  Circumflex Artery: Moderate in caliber size.  Contains a 50% mid segment stenoses.  The first marginal is relatively small in size and contains a 70% ostial segment stenoses.  Right Coronary Artery: Large in caliber size.  Contains luminal irregularities throughout.     2. Hemodynamics:  Left Ventricle: 91/5/8 mmHg  Aorta: 91/52/69 mmHg     3. rFR Assessment:  LAD: 0.86  LCx: 0.93     4. Percutaneous Intervention:  Location: Mid LAD  Treatment: Lesion was predilated with a 2.75 compliant balloon followed by a 3.0 x 10 mm angiosculpt balloon followed by placement of a 2.75 x 33 mm Xience drug-eluting stent which was postdilated with a 3.0 noncompliant balloon in the distal and mid portions and a 3.5 noncompliant balloon in the proximal and mid portions.  Pre-stenosis: 70 %  Post-stenosis: 0 %  Lesion Type C: Yes  YING Flow Pre: 2  YING Flow Post: 3  Bifurcation: No  Severe Calcium: No  Dissection: No     Conclusions:  1. Severe single-vessel coronary artery disease with in-stent restenosis of the mid LAD stent with 70% distal segment stenoses.  Moderate 50% stenoses in the mid circumflex.  70% stenoses of the first marginal.  2. Confirmation of hemodynamic flow-limiting stenosis of the LAD with RFR assessment at 0.86 as well as confirming nonsignificant circumflex stenoses with RFR assessment at 0.93.  3. Successful percutaneous intervention with placement of 2.75 x 33 mm Xience drug-eluting stent which was postdilated distally with a 3.0 mm noncompliant balloon to 20 mery and a 3.5 mm noncompliant balloon proximally to 16 mery  with no residual stenosis and restoration of YING-3 flow.       The following portions of the patient's history were reviewed and updated as appropriate: allergies, current medications, past family history, past medical history, past social history, past surgical history and problem list.    Past Medical History:   Diagnosis Date   • Congestive cardiac failure (CMS/[facility])    • Coronary artery disease    • Diabetes mellitus (CMS/[facility])    • Heart disease    • Hyperlipidemia    • Hypertension        Past Surgical History:   Procedure Laterality Date   • CARDIAC CATHETERIZATION N/A 5/19/2020    Procedure: Left Heart Cath;  Surgeon: Trea Kemp MD;  Location:  [facility] CATH INVASIVE LOCATION;  Service: Cardiovascular;  Laterality: N/A;   • CARDIAC CATHETERIZATION N/A 5/19/2020    Procedure: Coronary angiography;  Surgeon: Trae Kemp MD;  Location:  [facility] CATH INVASIVE LOCATION;  Service: Cardiovascular;  Laterality: N/A;   • CARDIAC CATHETERIZATION N/A 5/19/2020    Procedure: Left ventriculography;  Surgeon: Trae Kemp MD;  [facility];  Service: Cardiovascular;  Laterality: N/A;   • CARDIAC CATHETERIZATION N/A 5/19/2020    Procedure: Resting Full Cycle Ratio;  Surgeon: Trae Kemp MD;  Location:  [facility] CATH INVASIVE LOCATION;  Service: Cardiovascular;  Laterality: N/A;   • CARDIAC CATHETERIZATION N/A 5/19/2020    Procedure: Stent KENNEDY coronary;  Surgeon: Trae Kemp MD;  [facility];  Service: Cardiovascular;  Laterality: N/A;   • CARDIAC CATHETERIZATION N/A 5/19/2020    Procedure: Percutaneous Coronary Intervention;  Surgeon: Trae Kemp MD;  Location: [facility] CATH INVASIVE LOCATION;  Service: Cardiovascular;  Laterality: N/A;   • CARDIAC SURGERY      x2   • CERVICAL SPINE SURGERY         Social History     Socioeconomic History   • Marital status:      Spouse name: Not on file   • Number of children: Not on file   •  "Years of education: Not on file   • Highest education level: Not on file   Tobacco Use   • Smoking status: Never Smoker   • Smokeless tobacco: Never Used   Substance and Sexual Activity   • Alcohol use: Not Currently     Frequency: Never     Comment: Caffeine use: 1-2 cokes daily   • Drug use: Never   • Sexual activity: Defer       Review of Systems   Constitution: Negative for chills, decreased appetite, fever and night sweats.   HENT: Negative for ear discharge, ear pain, hearing loss, nosebleeds and sore throat.    Eyes: Negative for blurred vision, double vision and pain.   Cardiovascular: Negative for cyanosis.   Respiratory: Negative for hemoptysis and sputum production.    Endocrine: Negative for cold intolerance and heat intolerance.   Hematologic/Lymphatic: Negative for adenopathy.   Skin: Negative for dry skin, itching, nail changes, rash and suspicious lesions.   Musculoskeletal: Positive for back pain and joint pain. Negative for arthritis, gout, muscle cramps, muscle weakness, myalgias and neck pain.   Gastrointestinal: Negative for anorexia, bowel incontinence, constipation, diarrhea, dysphagia, hematemesis and jaundice.   Genitourinary: Negative for bladder incontinence, dysuria, flank pain, frequency, hematuria and nocturia.   Neurological: Negative for focal weakness, numbness, paresthesias and seizures.   Psychiatric/Behavioral: Negative for altered mental status, hallucinations, hypervigilance, suicidal ideas and thoughts of violence.   Allergic/Immunologic: Negative for persistent infections.         Performed by: Augusto Gloria III, MD  Authorized by: Augusto Gloria III, MD                  Objective:     Vitals:    10/08/20 1258   BP: 120/80   Pulse: 74   Resp: 16   SpO2: 98%   Weight: 90.3 kg (199 lb)   Height: 162.6 cm (64\")         Vitals signs reviewed.   Constitutional:       General: Not in acute distress.     Appearance: Well-developed. Not diaphoretic.   Eyes:      General:         " Right eye: No discharge.         Left eye: No discharge.      Conjunctiva/sclera: Conjunctivae normal.      Pupils: Pupils are equal, round, and reactive to light.   HENT:      Head: Normocephalic and atraumatic.      Nose: Nose normal.   Neck:      Musculoskeletal: Normal range of motion and neck supple.      Thyroid: No thyromegaly.      Trachea: No tracheal deviation.      Lymphadenopathy: No cervical adenopathy.   Pulmonary:      Effort: Pulmonary effort is normal. No respiratory distress.      Breath sounds: Normal breath sounds. No stridor.   Chest:      Chest wall: Not tender to palpatation.   Cardiovascular:      Normal rate. Regular rhythm.      Murmurs: There is no murmur.      . No S3 gallop. No click. No rub.   Pulses:     Intact distal pulses.   Abdominal:      General: Bowel sounds are normal. There is no distension.      Palpations: Abdomen is soft. There is no abdominal mass.      Tenderness: There is no abdominal tenderness. There is no guarding or rebound.   Musculoskeletal: Normal range of motion.         General: No tenderness or deformity.   Skin:     General: Skin is warm and dry.      Findings: No erythema or rash.   Neurological:      Mental Status: Alert and oriented to person, place, and time.      Deep Tendon Reflexes: Reflexes are normal and symmetric.   Psychiatric:         Thought Content: Thought content normal.         Lab Review:             Performed  Prior cardiac catheterization images reviewed personally, agree with interpretation  Prior echocardiogram is reviewed personally, I agree with the interpretation  Prior chest x-ray is reviewed and agree with the interpretation from the radiologist      Assessment:          Diagnosis Plan   1. Coronary artery disease of native artery of native heart with stable angina pectoris (CMS/[facility])  ECG 12 Lead   2. Type 2 diabetes mellitus with other circulatory complication, with long-term current use of insulin (CMS/[facility])  ECG 12 Lead   3.  Chronic renal failure, stage 3 (moderate), unspecified whether stage 3a or 3b CKD  ECG 12 Lead          Plan:         1.  CAD, prior stent placement of the LAD in May.  New problem to me, pain-free, continue current medical therapy.  Patient remains on dual antiplatelet therapy.  We discussed risk factor modifications.  2.  Chronic renal failure, new problem to me, recent lab work reviewed, prior losartan therapy was discontinued by nephrology when he was hospitalized.  3.  Obstructive sleep apnea, new problem to me, following with pulmonary  4.  Chronic back pain.  New problem to me, patient's major issue at this point, we discussed at length the issues of risk and benefit regarding surgical intervention and his recent stent placement.  Patient is scheduled to follow back up with neurosurgery and I will send him a note.  Patient was to have surgery for compressed disc.  He is scheduled to follow-up with neurosurgery soon.  Risk of stopping antiplatelet therapy would diminish after 6 months which would be in November.  The absolute minimal point of cardiovascular risk would be after 12 months of antiplatelet therapy.  Certainly if it is necessary from surgical standpoint appropriate after 6 months to hold aspirin and Plavix for the surgical procedure.  He does not meet guideline recommendations for any additional cardiac testing at this time.    Thank you very much for allowing us to participate in the care of this pleasant patient.  Please don't hesitate to call if I can be of assistance in any way.      Current Outpatient Medications:   •  aspirin 81 MG chewable tablet, Chew 81 mg Daily., Disp: , Rfl:   •  clopidogrel (PLAVIX) 75 MG tablet, Take 75 mg by mouth Daily., Disp: , Rfl:   •  DULoxetine (CYMBALTA) 20 MG capsule, Take 20 mg by mouth Daily., Disp: , Rfl:   •  Empagliflozin-metFORMIN HCl (Synjardy) 5-1000 MG tablet, Take  by mouth Daily., Disp: , Rfl:   •  glipizide (GLUCOTROL) 10 MG tablet, Take 10 mg by  mouth 2 (Two) Times a Day Before Meals., Disp: , Rfl:   •  HYDROcodone-acetaminophen (NORCO) 7.5-325 MG per tablet, Take 1 tablet by mouth Every 6 (Six) Hours As Needed for Moderate Pain ., Disp: , Rfl:   •  insulin glargine (LANTUS) 100 UNIT/ML injection, Inject 52 Units under the skin into the appropriate area as directed Daily., Disp: , Rfl:   •  insulin lispro (humaLOG) 100 UNIT/ML injection, Inject  under the skin into the appropriate area as directed 3 (Three) Times a Day Before Meals., Disp: , Rfl:   •  isosorbide dinitrate (ISORDIL) 30 MG tablet, Take 2 tablets by mouth Daily., Disp: 180 tablet, Rfl: 3  •  metFORMIN ER (GLUCOPHAGE-XR) 750 MG 24 hr tablet, Take 750 mg by mouth Daily., Disp: , Rfl:   •  metoprolol succinate XL (TOPROL-XL) 50 MG 24 hr tablet, Take 50 mg by mouth 2 (Two) Times a Day., Disp: , Rfl:   •  omeprazole (priLOSEC) 40 MG capsule, Take 40 mg by mouth Daily., Disp: , Rfl:   •  simvastatin (ZOCOR) 40 MG tablet, Take 40 mg by mouth Daily., Disp: , Rfl:          No follow-ups on file.

## 2021-01-07 ENCOUNTER — TELEPHONE (OUTPATIENT)
Dept: CARDIOLOGY | Facility: CLINIC | Age: 71
End: 2021-01-07

## 2021-01-07 NOTE — TELEPHONE ENCOUNTER
You can schedule the pt to see Johnnie Cavazos tomorrow. She has openings in the [place] office if they need to be seen this week.

## 2021-01-07 NOTE — PROGRESS NOTES
Date of Office Visit: 2021  Encounter Provider: CELESTE Snider  Place of Service: [facility] CARDIOLOGY  Patient Name: Jett Flower  :1950  Primary Cardiologist: Dr. Gloria    CC:  Increased HR    Dear Dr. Groves    HPI: Jett Flower is a pleasant 70 y.o. male who presents 2021 for cardiac follow up.  He is a new patient to me and I reviewed his past medical records.  Established care with Dr. Gloria and 10/8/2020.    Patient has a known history of hypertension, hyperlipidemia, renal failure chronic, and diabetes mellitus with circulatory complication in addition to his CAD. He has a history of coronary disease and had drug-eluting stent placed in LAD in May 2020.     He presented the emergency room [facility] in May 2020 with chest discomfort.  He ruled out for acute myocardial infarction with normal troponin.  He underwent cardiac catheterization and showed stenosis in his LAD and then received drug-eluting stent to LAD.  He did have a prior history of coronary disease.  In  he was diagnosed with CAD and had a stent placed in [place] although we have never received those records.       He was here in  and was stating he went to have surgery done on his back.  We explained that the risk was greatest with holding antiplatelet therapy within the first 6 months after stent placement, then was less for the subsequent 6 months, and minimal after 12 months.  He comes back in to discuss stopping his meds at 6 months and going through with the surgery.       Echo 2020  Interpretation Summary    · Left ventricular systolic function is hyperdynamic (EF > 70%). Calculated EF = 76.0%. Estimated EF was in agreement with the calculated EF. Estimated EF = 76%. Normal left ventricular cavity size noted. All left ventricular wall segments contract normally. Left ventricular wall thickness is consistent with concentric hypertrophy.  · The basal left  ventricular septum is sigmoid with a late peaking signal in the outflow tract without significant obstruction. The outflow tract itself was not be well visualized. Left ventricular diastolic dysfunction is noted (grade I) consistent with impaired relaxation.  · The aortic valve is abnormal in structure. There is moderate thickening of the aortic valve.          Cardiac catheterization May 19, 2020:  Findings:  1. Coronary Artery Anatomy:  Dominance: Right  Left Main: Angiographically normal  Left Anterior Descending: Moderate in caliber size.  The proximal segment contains luminal irregularities.  There is a patent stent within the mid segment which contains diffuse moderate 50-60 in-stent restenosis within the proximal segment however in the distal segment there is 70% segment stenoses with YING II flow distally.  The distal LAD contains scattered 50% segment stenoses.  There are 2 small diagonal branches which contain luminal irregularities.  Circumflex Artery: Moderate in caliber size.  Contains a 50% mid segment stenoses.  The first marginal is relatively small in size and contains a 70% ostial segment stenoses.  Right Coronary Artery: Large in caliber size.  Contains luminal irregularities throughout.   2. Hemodynamics:  Left Ventricle: 91/5/8 mmHg  Aorta: 91/52/69 mmHg   3. rFR Assessment:  LAD: 0.86  LCx: 0.93   4. Percutaneous Intervention:  Location: Mid LAD  Treatment: Lesion was predilated with a 2.75 compliant balloon followed by a 3.0 x 10 mm angiosculpt balloon followed by placement of a 2.75 x 33 mm Xience drug-eluting stent which was postdilated with a 3.0 noncompliant balloon in the distal and mid portions and a 3.5 noncompliant balloon in the proximal and mid portions.  Pre-stenosis: 70 %  Post-stenosis: 0 %  Lesion Type C: Yes  YING Flow Pre: 2  YING Flow Post: 3  Bifurcation: No  Severe Calcium: No  Dissection: No   Conclusions:  1. Severe single-vessel coronary artery disease with in-stent  "restenosis of the mid LAD stent with 70% distal segment stenoses.  Moderate 50% stenoses in the mid circumflex.  70% stenoses of the first marginal.  2. Confirmation of hemodynamic flow-limiting stenosis of the LAD with RFR assessment at 0.86 as well as confirming nonsignificant circumflex stenoses with RFR assessment at 0.93.  3. Successful percutaneous intervention with placement of 2.75 x 33 mm Xience drug-eluting stent which was postdilated distally with a 3.0 mm noncompliant balloon to 20 mery and a 3.5 mm noncompliant balloon proximally to 16 mery with no residual stenosis and restoration of YING-3 flow.     He returns today with complaints of increased heart rate.  He states it can happen with activity and without.  He states he cannot really tell that it is skipping but he can feels that it is fast he states he has shortness of breath that is worse with activity but this is not a change.  He denies any lower extremity edema.  He has dizziness with quick positional changes.  He states he will have some chest pain that at times is intense and sharp.  He states it can last anywhere from a second to \"half a day\".  He states on the average he probably takes sublingual nitroglycerin 1-2 times a week.  He did state that sometimes he does not have to take 1 at all and other weeks he may take up to 3.  He states his normal blood pressure is usually 130/140s-70s/80s.  He states it is mostly 130s/70s.  He does have obstructive sleep apnea.  He does have a machine at home.  He states he does not use it and \"I am not going to use it\".  He does have an appointment coming up soon with pulmonology.  He states he has tried several masks and he feels he is getting a good go but he states he is awake half the night adjusting the mask and he gets no more sleep now than he did if he did not use it at all.  He denies any PND, cough, orthopnea.  He is taking his medications as directed.  He denies any unexplained bleeding, dark or " tarry stools.    Past Medical History:   Diagnosis Date   • Congestive cardiac failure (CMS/[facility])    • Coronary artery disease    • Diabetes mellitus (CMS/[facility])    • Heart disease    • Hyperlipidemia    • Hypertension        Past Surgical History:   Procedure Laterality Date   • CARDIAC CATHETERIZATION N/A 5/19/2020    Procedure: Left Heart Cath;  Surgeon: Trae Kemp MD;  Location:  [facility] CATH INVASIVE LOCATION;  Service: Cardiovascular;  Laterality: N/A;   • CARDIAC CATHETERIZATION N/A 5/19/2020    Procedure: Coronary angiography;  Surgeon: Trae Kemp MD;  Location:  [facility] CATH INVASIVE LOCATION;  Service: Cardiovascular;  Laterality: N/A;   • CARDIAC CATHETERIZATION N/A 5/19/2020    Procedure: Left ventriculography;  Surgeon: Trae Kemp MD;  Location:  [facility] CATH INVASIVE LOCATION;  Service: Cardiovascular;  Laterality: N/A;   • CARDIAC CATHETERIZATION N/A 5/19/2020    Procedure: Resting Full Cycle Ratio;  Surgeon: Trae Kemp MD;  [facility];  Service: Cardiovascular;  Laterality: N/A;   • CARDIAC CATHETERIZATION N/A 5/19/2020    Procedure: Stent KENNEDY coronary;  Surgeon: Trae Kemp MD;  Location:  [facility] CATH INVASIVE LOCATION;  Service: Cardiovascular;  Laterality: N/A;   • CARDIAC CATHETERIZATION N/A 5/19/2020    Procedure: Percutaneous Coronary Intervention;  Surgeon: Trae Kemp MD;  [facility];  Service: Cardiovascular;  Laterality: N/A;   • CARDIAC SURGERY      x2   • CERVICAL SPINE SURGERY         Social History     Socioeconomic History   • Marital status:      Spouse name: Not on file   • Number of children: Not on file   • Years of education: Not on file   • Highest education level: Not on file   Tobacco Use   • Smoking status: Never Smoker   • Smokeless tobacco: Never Used   Substance and Sexual Activity   • Alcohol use: Not Currently     Frequency: Never     Comment: Caffeine use: 1-2 cokes daily    • Drug use: Never   • Sexual activity: Defer       Family History   Problem Relation Age of Onset   • Heart attack Mother    • Emphysema Father    • Heart attack Father    • Stroke Father    • Arthritis Father    • Arthritis Sister    • Sudden death Brother         shot   • No Known Problems Daughter    • No Known Problems Son    • No Known Problems Maternal Grandmother    • No Known Problems Maternal Grandfather    • Emphysema Paternal Grandmother    • Diabetes Paternal Grandfather    • No Known Problems Sister    • No Known Problems Sister    • No Known Problems Sister    • No Known Problems Sister    • No Known Problems Sister    • No Known Problems Daughter    • No Known Problems Daughter        The following portion of the patient's history were reviewed and updated as appropriate: past medical history, past surgical history, past social history, past family history, allergies, current medications, and problem list.    Review of Systems   Constitution: Negative for diaphoresis, fever and malaise/fatigue.   HENT: Negative for congestion, hearing loss, hoarse voice, nosebleeds and sore throat.    Eyes: Negative for photophobia, vision loss in left eye, vision loss in right eye and visual disturbance.   Cardiovascular: Positive for chest pain (intermittent) and palpitations. Negative for dyspnea on exertion, irregular heartbeat, leg swelling, near-syncope, orthopnea, paroxysmal nocturnal dyspnea and syncope.   Respiratory: Positive for shortness of breath (with activity, no change). Negative for cough, hemoptysis, sleep disturbances due to breathing, snoring, sputum production and wheezing.    Endocrine: Negative for cold intolerance, heat intolerance, polydipsia, polyphagia and polyuria.   Hematologic/Lymphatic: Negative for bleeding problem. Does not bruise/bleed easily.   Skin: Negative for color change, dry skin, poor wound healing, rash and suspicious lesions.   Musculoskeletal: Negative for arthritis, back  pain, falls, gout, joint pain, joint swelling, muscle cramps, muscle weakness and myalgias.   Gastrointestinal: Negative for bloating, abdominal pain, constipation, diarrhea, dysphagia, melena, nausea and vomiting.   Neurological: Positive for dizziness (with quick positional changes). Negative for excessive daytime sleepiness, headaches, light-headedness, loss of balance, numbness, paresthesias, seizures, vertigo and weakness.   Psychiatric/Behavioral: Negative for depression, memory loss and substance abuse. The patient is not nervous/anxious.        No Known Allergies      Current Outpatient Medications:   •  aspirin 81 MG chewable tablet, Chew 81 mg Daily., Disp: , Rfl:   •  clopidogrel (PLAVIX) 75 MG tablet, Take 75 mg by mouth Daily., Disp: , Rfl:   •  DULoxetine (CYMBALTA) 20 MG capsule, Take 20 mg by mouth Daily., Disp: , Rfl:   •  Empagliflozin-metFORMIN HCl (Synjardy) 5-1000 MG tablet, Take  by mouth Daily., Disp: , Rfl:   •  glipizide (GLUCOTROL) 10 MG tablet, Take 10 mg by mouth 2 (Two) Times a Day Before Meals., Disp: , Rfl:   •  HYDROcodone-acetaminophen (NORCO) 7.5-325 MG per tablet, Take 1 tablet by mouth Every 6 (Six) Hours As Needed for Moderate Pain ., Disp: , Rfl:   •  insulin glargine (LANTUS) 100 UNIT/ML injection, Inject 52 Units under the skin into the appropriate area as directed Daily., Disp: , Rfl:   •  insulin lispro (humaLOG) 100 UNIT/ML injection, Inject  under the skin into the appropriate area as directed 3 (Three) Times a Day Before Meals., Disp: , Rfl:   •  isosorbide dinitrate (ISORDIL) 30 MG tablet, Take 2 tablets by mouth Daily., Disp: 180 tablet, Rfl: 3  •  metFORMIN ER (GLUCOPHAGE-XR) 750 MG 24 hr tablet, Take 750 mg by mouth Daily., Disp: , Rfl:   •  metoprolol succinate XL (TOPROL-XL) 50 MG 24 hr tablet, Take 50 mg by mouth 2 (Two) Times a Day., Disp: , Rfl:   •  omeprazole (priLOSEC) 40 MG capsule, Take 40 mg by mouth Daily., Disp: , Rfl:   •  simvastatin (ZOCOR) 40 MG  "tablet, Take 40 mg by mouth Daily., Disp: , Rfl:         Objective:     Vitals:    01/08/21 1314   BP: 140/70   BP Location: Right arm   Pulse: 84   SpO2: 90%   Weight: 93.9 kg (207 lb 1.6 oz)   Height: 162.6 cm (64\")     Body mass index is 35.55 kg/m².      Vitals signs reviewed.   Constitutional:       General: Not in acute distress.     Appearance: Healthy appearance. Well-groomed, overweight and not in distress.   Eyes:      General:         Right eye: No discharge.         Left eye: No discharge.      Conjunctiva/sclera: Conjunctivae normal.   HENT:      Head: Normocephalic and atraumatic.      Right Ear: External ear normal.      Left Ear: External ear normal.      Nose: Nose normal.   Neck:      Musculoskeletal: Normal range of motion and neck supple.      Thyroid: No thyromegaly.      Vascular: No JVD.      Trachea: No tracheal deviation.      Lymphadenopathy: No cervical adenopathy.   Pulmonary:      Effort: Pulmonary effort is normal. No respiratory distress.      Breath sounds: Normal breath sounds. No wheezing. No rales.   Chest:      Chest wall: Not tender to palpatation.   Cardiovascular:      Normal rate. Regular rhythm.      No gallop.   Pulses:     Intact distal pulses.   Abdominal:      General: There is no distension.      Palpations: Abdomen is soft.      Tenderness: There is no abdominal tenderness.   Musculoskeletal: Normal range of motion.         General: No tenderness or deformity.   Skin:     General: Skin is warm and dry.      Findings: No erythema or rash.   Neurological:      Mental Status: Alert and oriented to person, place, and time.      Coordination: Coordination normal.   Psychiatric:         Behavior: Behavior normal. Behavior is cooperative.         Thought Content: Thought content normal.         Judgment: Judgment normal.               ECG 12 Lead    Date/Time: 1/8/2021 1:26 PM  Performed by: Cristina Cavazos APRN  Authorized by: Cristina Cavazos APRN   Comparison: compared with " "previous ECG from 6/10/2020  Similar to previous ECG  Rhythm: sinus rhythm  Rate: normal  Conduction: conduction normal  Q waves: III and aVF    ST Segments: ST segments normal  T Waves: T waves normal  QRS axis: normal    Clinical impression: non-specific ECG              Assessment:       Diagnosis Plan   1. Coronary artery disease of native artery of native heart with stable angina pectoris (CMS/HCC)     2. Chronic renal failure, stage 3 (moderate), unspecified whether stage 3a or 3b CKD     3. MANDEEP (obstructive sleep apnea)     4. Type 2 diabetes mellitus with other circulatory complication, with long-term current use of insulin (CMS/[facility])     5. Palpitations  Holter Monitor - 48 Hour          Plan:       1.  CAD, prior stent placement of the LAD in May.  Intermittent chest pain, takes S/L nitro on the average 1-2 times a week, sometimes not at all during the week. Continue on DAPT.  Continue BB and nitrates. We discussed risk factor modifications.  2.  Chronic renal failure,  prior losartan therapy was discontinued by nephrology when he was hospitalized 5/2020  3.  Obstructive sleep apnea,  following with pulmonary.  States ha cannot tolerate the mask and \"I am not gonna wear it\".  Appt soon  4.  Chronic back pain. Better after recent surgery  5.  DM - on therapy  6.  Palpitations/tachycardia - check 48 hour Holter      As always, it has been a pleasure to participate in your patient's care. Thank you.       Sincerely,       CELESTE Snider      Current Outpatient Medications:   •  aspirin 81 MG chewable tablet, Chew 81 mg Daily., Disp: , Rfl:   •  clopidogrel (PLAVIX) 75 MG tablet, Take 75 mg by mouth Daily., Disp: , Rfl:   •  DULoxetine (CYMBALTA) 20 MG capsule, Take 20 mg by mouth Daily., Disp: , Rfl:   •  Empagliflozin-metFORMIN HCl (Synjardy) 5-1000 MG tablet, Take  by mouth Daily., Disp: , Rfl:   •  glipizide (GLUCOTROL) 10 MG tablet, Take 10 mg by mouth 2 (Two) Times a Day Before Meals., Disp: , Rfl:   •  " HYDROcodone-acetaminophen (NORCO) 7.5-325 MG per tablet, Take 1 tablet by mouth Every 6 (Six) Hours As Needed for Moderate Pain ., Disp: , Rfl:   •  insulin glargine (LANTUS) 100 UNIT/ML injection, Inject 52 Units under the skin into the appropriate area as directed Daily., Disp: , Rfl:   •  insulin lispro (humaLOG) 100 UNIT/ML injection, Inject  under the skin into the appropriate area as directed 3 (Three) Times a Day Before Meals., Disp: , Rfl:   •  isosorbide dinitrate (ISORDIL) 30 MG tablet, Take 2 tablets by mouth Daily., Disp: 180 tablet, Rfl: 3  •  metFORMIN ER (GLUCOPHAGE-XR) 750 MG 24 hr tablet, Take 750 mg by mouth Daily., Disp: , Rfl:   •  metoprolol succinate XL (TOPROL-XL) 50 MG 24 hr tablet, Take 50 mg by mouth 2 (Two) Times a Day., Disp: , Rfl:   •  omeprazole (priLOSEC) 40 MG capsule, Take 40 mg by mouth Daily., Disp: , Rfl:   •  simvastatin (ZOCOR) 40 MG tablet, Take 40 mg by mouth Daily., Disp: , Rfl:     Dictated utilizing Dragon dictation

## 2021-01-07 NOTE — TELEPHONE ENCOUNTER
Patient's wife is calling urgently to try and get him in as soon as possible. She said that he is having a rapid heart beat and she is worried and wants to try and get him into the [place] office. The next available appointment is on 1/18/21 at 10 A.M. but she said that it would be easier for her if it was later in the day. I was wondering if we could use one of the same day spots that day at 2:45. Please advise.     Thanks, Ravi

## 2021-01-08 ENCOUNTER — HOSPITAL ENCOUNTER (OUTPATIENT)
Dept: CARDIOLOGY | Facility: HOSPITAL | Age: 71
Discharge: HOME | End: 2021-01-08
Admitting: NURSE PRACTITIONER

## 2021-01-08 ENCOUNTER — OFFICE VISIT (OUTPATIENT)
Dept: CARDIOLOGY | Facility: CLINIC | Age: 71
End: 2021-01-08

## 2021-01-08 VITALS
DIASTOLIC BLOOD PRESSURE: 70 MMHG | HEIGHT: 64 IN | SYSTOLIC BLOOD PRESSURE: 140 MMHG | OXYGEN SATURATION: 90 % | BODY MASS INDEX: 35.36 KG/M2 | HEART RATE: 84 BPM | WEIGHT: 207.1 LBS

## 2021-01-08 DIAGNOSIS — G47.33 OSA (OBSTRUCTIVE SLEEP APNEA): ICD-10-CM

## 2021-01-08 DIAGNOSIS — I25.118 CORONARY ARTERY DISEASE OF NATIVE ARTERY OF NATIVE HEART WITH STABLE ANGINA PECTORIS (HCC): Primary | ICD-10-CM

## 2021-01-08 DIAGNOSIS — Z79.4 TYPE 2 DIABETES MELLITUS WITH OTHER CIRCULATORY COMPLICATION, WITH LONG-TERM CURRENT USE OF INSULIN (HCC): Chronic | ICD-10-CM

## 2021-01-08 DIAGNOSIS — R00.2 PALPITATIONS: ICD-10-CM

## 2021-01-08 DIAGNOSIS — E11.59 TYPE 2 DIABETES MELLITUS WITH OTHER CIRCULATORY COMPLICATION, WITH LONG-TERM CURRENT USE OF INSULIN (HCC): Chronic | ICD-10-CM

## 2021-01-08 DIAGNOSIS — N18.30 CHRONIC RENAL FAILURE, STAGE 3 (MODERATE), UNSPECIFIED WHETHER STAGE 3A OR 3B CKD (HCC): Chronic | ICD-10-CM

## 2021-01-08 PROCEDURE — 93225 XTRNL ECG REC<48 HRS REC: CPT

## 2021-01-08 PROCEDURE — 93000 ELECTROCARDIOGRAM COMPLETE: CPT | Performed by: NURSE PRACTITIONER

## 2021-01-08 PROCEDURE — 99214 OFFICE O/P EST MOD 30 MIN: CPT | Performed by: NURSE PRACTITIONER

## 2021-01-08 PROCEDURE — 93226 XTRNL ECG REC<48 HR SCAN A/R: CPT

## 2021-01-12 PROCEDURE — 93227 XTRNL ECG REC<48 HR R&I: CPT | Performed by: INTERNAL MEDICINE

## 2021-01-29 ENCOUNTER — TELEPHONE (OUTPATIENT)
Dept: CARDIOLOGY | Facility: CLINIC | Age: 71
End: 2021-01-29

## 2021-02-02 ENCOUNTER — APPOINTMENT (OUTPATIENT)
Dept: PREADMISSION TESTING | Facility: HOSPITAL | Age: 71
End: 2021-02-02

## 2021-02-02 NOTE — TELEPHONE ENCOUNTER
Called and spoke with Caron and informed her that he is cleared to have surgery. I have also faxed a clearance letter to them as well. Faxed confirmed

## 2021-02-02 NOTE — TELEPHONE ENCOUNTER
C[facility] called [facility] from [facility]. Patient will be having a TURP procedure done on 02/05/21. They are needing cardiac clearance. Are you ok with clearing this patient? How many days prior to surgery would you like for patient to stop his aspirin and plavix?    He was last seen 01/08/21 by Cristina ALONSO.     Fax number: [phone]    Thanks

## 2021-02-03 ENCOUNTER — APPOINTMENT (OUTPATIENT)
Dept: LAB | Facility: HOSPITAL | Age: 71
End: 2021-02-03

## 2021-02-03 ENCOUNTER — APPOINTMENT (OUTPATIENT)
Dept: PREADMISSION TESTING | Facility: HOSPITAL | Age: 71
End: 2021-02-03

## 2021-02-03 VITALS
SYSTOLIC BLOOD PRESSURE: 92 MMHG | HEART RATE: 77 BPM | RESPIRATION RATE: 20 BRPM | OXYGEN SATURATION: 95 % | DIASTOLIC BLOOD PRESSURE: 54 MMHG

## 2021-02-03 LAB
ANION GAP SERPL CALCULATED.3IONS-SCNC: 10.6 MMOL/L (ref 5–15)
BUN SERPL-MCNC: 26 MG/DL (ref 8–23)
BUN/CREAT SERPL: 21.3 (ref 7–25)
CALCIUM SPEC-SCNC: 9.6 MG/DL (ref 8.6–10.5)
CHLORIDE SERPL-SCNC: 105 MMOL/L (ref 98–107)
CO2 SERPL-SCNC: 21.4 MMOL/L (ref 22–29)
CREAT SERPL-MCNC: 1.22 MG/DL (ref 0.76–1.27)
DEPRECATED RDW RBC AUTO: 48.3 FL (ref 37–54)
ERYTHROCYTE [DISTWIDTH] IN BLOOD BY AUTOMATED COUNT: 14.9 % (ref 12.3–15.4)
GFR SERPL CREATININE-BSD FRML MDRD: 59 ML/MIN/1.73
GLUCOSE SERPL-MCNC: 228 MG/DL (ref 65–99)
HBA1C MFR BLD: 8.7 % (ref 4.8–5.6)
HCT VFR BLD AUTO: 48.6 % (ref 37.5–51)
HGB BLD-MCNC: 14.6 G/DL (ref 13–17.7)
MCH RBC QN AUTO: 27 PG (ref 26.6–33)
MCHC RBC AUTO-ENTMCNC: 30 G/DL (ref 31.5–35.7)
MCV RBC AUTO: 90 FL (ref 79–97)
PLATELET # BLD AUTO: 193 10*3/MM3 (ref 140–450)
PMV BLD AUTO: 10.4 FL (ref 6–12)
POTASSIUM SERPL-SCNC: 5.5 MMOL/L (ref 3.5–5.2)
RBC # BLD AUTO: 5.4 10*6/MM3 (ref 4.14–5.8)
SARS-COV-2 RNA PNL SPEC NAA+PROBE: NOT DETECTED
SODIUM SERPL-SCNC: 137 MMOL/L (ref 136–145)
WBC # BLD AUTO: 8.03 10*3/MM3 (ref 3.4–10.8)

## 2021-02-03 PROCEDURE — 83036 HEMOGLOBIN GLYCOSYLATED A1C: CPT | Performed by: UROLOGY

## 2021-02-03 PROCEDURE — C9803 HOPD COVID-19 SPEC COLLECT: HCPCS

## 2021-02-03 PROCEDURE — 87635 SARS-COV-2 COVID-19 AMP PRB: CPT | Performed by: UROLOGY

## 2021-02-03 PROCEDURE — 85027 COMPLETE CBC AUTOMATED: CPT | Performed by: UROLOGY

## 2021-02-03 PROCEDURE — 36415 COLL VENOUS BLD VENIPUNCTURE: CPT

## 2021-02-03 PROCEDURE — 80048 BASIC METABOLIC PNL TOTAL CA: CPT | Performed by: UROLOGY

## 2021-02-03 RX ORDER — TAMSULOSIN HYDROCHLORIDE 0.4 MG/1
1 CAPSULE ORAL DAILY
COMMUNITY
End: 2021-02-07 | Stop reason: HOSPADM

## 2021-02-03 NOTE — PAT
Pt here for PAT visit.  Pre-op tests completed, chg soap given, and instructions reviewed.  Instructed clears until 2 hrs prior to arrival time, voiced understanding. Covid swab complete, to stop Plavix 3 days prior to surgery per Cardiology

## 2021-02-03 NOTE — PAT
Anesthesia requesting Pulmonary Clearance. Caron at Dr Diez's office notified and via email and phone.

## 2021-02-03 NOTE — DISCHARGE INSTRUCTIONS
PRE-ADMISSION TESTING INSTRUCTIONS FOR ADULTS    Take these medications the morning of surgery with a small sip of water: Simvastatin, Omeprazole, Cymbalta, Isordil      No aspirin, advil, aleve, ibuprofen, naproxen, diet pills, decongestants, or herbal/vitamins for a week prior to surgery.    General Instructions:    • Do not eat solid food after midnight the night before surgery.  No gum, mints, or hard candy after midnight the night before surgery.  • You may drink clear liquids the day of surgery up until 2 hours before your arrival time.  • Clear liquids are liquids you can see through. Nothing RED in color.    Plain water    Sports drinks  Sodas     Gelatin (Jell-O)  Fruit juices without pulp such as white grape juice and apple juice  Popsicles that contain no fruit or yogurt  Tea or coffee (no cream or milk added)    • It is beneficial for you to have a clear drink that contains carbohydrates just before you leave your house and before your fasting time begins.  We suggest a 20 ounce bottle of Gatorade or Powerade for non-diabetic patients or a 20 ounce bottle of G2 or Powerade Zero for diabetic patients.     • Patients who avoid smoking, chewing tobacco and alcohol for 4 weeks prior to surgery have a reduced risk of post-operative complications.  If at all possible, quit smoking as many days before surgery as you can.    • Do not smoke, use chewing tobacco or drink alcohol the day of surgery    • Bring your C-PAP/ BI-PAP machine if you use one.  • Wear clean comfortable clothes and socks.  • Do not wear contact lenses, lotion, deodorant, or make-up.  Bring a case for your glasses if applicable. You may brush your teeth the morning of surgery.  • You may wear dentures/partials, do not put adhesive/glue on them.  • Bring crutches or walker if applicable.  • Leave all other jewelry and valuables at home.      Preventing a Surgical Site Infection:    • Shower the night before and on the morning of surgery using  the chlorhexidine soap you were given.  Use a clean washcloth with the soap.  Place clean sheets on your bed after showering the night before surgery. Do not use the CHG soap on your hair, face, or private areas. Wash your body gently for five (5) minutes. Do not scrub your skin.  Dry with a clean towel and dress in clean clothing.    • Do not shave the surgical area for 10 days-2 weeks prior to surgery  because the razor can irritate skin and make it easier to develop an infection.  • Make sure you, your family, and all healthcare providers clean their hands with soap and water or an alcohol based hand  before caring for you or your wound.      Day of surgery:    Your surgeon’s office will advise you of your arrival time for the day of surgery.    Upon arrival, a Pre-op nurse and Anesthesia provider will review your health history, obtain vital signs, and answer questions you may have.  The only belongings needed at this time will be your home medications and if applicable your C-PAP/BI-PAP machine.  If you are staying overnight your family can leave the rest of your belongings in the car and bring them to your room later.  A Pre-op nurse will start an IV and you may receive medication in preparation for surgery, including something to help you relax.  Your family will be able to see you in the Pre-op area.  While you are in surgery your family should notify the waiting room  if they leave the waiting room area and provide a contact phone number.    IF you have any questions, you can call the Pre-Admission Department at [phone] or your surgeon's office.  Notify your surgeon if  you become sick, have a fever, productive cough, or cannot be here the day of surgery    Please be aware that surgery does come with discomfort.  We want to make every effort to control your discomfort so please discuss any uncontrolled symptoms with your nurse.   Your doctor will most likely have prescribed pain  medications.      If you are going home after surgery, you will receive individualized written care instructions before being discharged.  A responsible adult (over the age of 18) must drive you to and from the hospital on the day of your surgery and stay with you for 24 hours after anesthesia.    If you are staying overnight following surgery, you will be transported to your hospital room following the recovery period.  [facility] has all private rooms.    You may receive a survey regarding the care you received. Your feedback is very important and will be used to collect the necessary data to help us to continue to provide excellent care.     Deductibles and co-payments are collected on the day of service. Please be prepared to pay the required co-pay, deductible or deposit on the day of service as defined by your plan.

## 2021-02-04 ENCOUNTER — ANESTHESIA EVENT (OUTPATIENT)
Dept: PERIOP | Facility: HOSPITAL | Age: 71
End: 2021-02-04

## 2021-02-05 ENCOUNTER — HOSPITAL ENCOUNTER (OUTPATIENT)
Facility: HOSPITAL | Age: 71
Discharge: HOME | End: 2021-02-07
Attending: UROLOGY | Admitting: UROLOGY

## 2021-02-05 ENCOUNTER — ANESTHESIA (OUTPATIENT)
Dept: PERIOP | Facility: HOSPITAL | Age: 71
End: 2021-02-05

## 2021-02-05 DIAGNOSIS — I20.0 UNSTABLE ANGINA (HCC): ICD-10-CM

## 2021-02-05 DIAGNOSIS — D75.1 POLYCYTHEMIA: ICD-10-CM

## 2021-02-05 DIAGNOSIS — R11.2 NON-INTRACTABLE VOMITING WITH NAUSEA, UNSPECIFIED VOMITING TYPE: ICD-10-CM

## 2021-02-05 DIAGNOSIS — N13.8 BPH WITH URINARY OBSTRUCTION: ICD-10-CM

## 2021-02-05 DIAGNOSIS — E11.59 TYPE 2 DIABETES MELLITUS WITH OTHER CIRCULATORY COMPLICATION, WITH LONG-TERM CURRENT USE OF INSULIN (HCC): Chronic | ICD-10-CM

## 2021-02-05 DIAGNOSIS — R94.31 ABNORMAL EKG: ICD-10-CM

## 2021-02-05 DIAGNOSIS — N40.1 ENLARGED PROSTATE WITH URINARY OBSTRUCTION: ICD-10-CM

## 2021-02-05 DIAGNOSIS — N13.8 ENLARGED PROSTATE WITH URINARY OBSTRUCTION: ICD-10-CM

## 2021-02-05 DIAGNOSIS — N40.1 BPH WITH OBSTRUCTION/LOWER URINARY TRACT SYMPTOMS: Primary | ICD-10-CM

## 2021-02-05 DIAGNOSIS — N18.30 CHRONIC RENAL FAILURE, STAGE 3 (MODERATE) (HCC): Chronic | ICD-10-CM

## 2021-02-05 DIAGNOSIS — N40.1 BPH WITH URINARY OBSTRUCTION: ICD-10-CM

## 2021-02-05 DIAGNOSIS — K62.5 RECTAL BLEEDING: ICD-10-CM

## 2021-02-05 DIAGNOSIS — N13.8 BPH WITH OBSTRUCTION/LOWER URINARY TRACT SYMPTOMS: Primary | ICD-10-CM

## 2021-02-05 DIAGNOSIS — Z79.4 TYPE 2 DIABETES MELLITUS WITH OTHER CIRCULATORY COMPLICATION, WITH LONG-TERM CURRENT USE OF INSULIN (HCC): Chronic | ICD-10-CM

## 2021-02-05 LAB
ANION GAP SERPL CALCULATED.3IONS-SCNC: 13.2 MMOL/L (ref 5–15)
BUN SERPL-MCNC: 28 MG/DL (ref 8–23)
BUN/CREAT SERPL: 24.6 (ref 7–25)
CALCIUM SPEC-SCNC: 8.8 MG/DL (ref 8.6–10.5)
CHLORIDE SERPL-SCNC: 105 MMOL/L (ref 98–107)
CO2 SERPL-SCNC: 20.8 MMOL/L (ref 22–29)
CREAT SERPL-MCNC: 1.14 MG/DL (ref 0.76–1.27)
GFR SERPL CREATININE-BSD FRML MDRD: 64 ML/MIN/1.73
GLUCOSE BLDC GLUCOMTR-MCNC: 175 MG/DL (ref 70–130)
GLUCOSE BLDC GLUCOMTR-MCNC: 232 MG/DL (ref 70–130)
GLUCOSE BLDC GLUCOMTR-MCNC: 292 MG/DL (ref 70–130)
GLUCOSE BLDC GLUCOMTR-MCNC: 301 MG/DL (ref 70–130)
GLUCOSE SERPL-MCNC: 240 MG/DL (ref 65–99)
POTASSIUM SERPL-SCNC: 5.4 MMOL/L (ref 3.5–5.2)
SODIUM SERPL-SCNC: 139 MMOL/L (ref 136–145)

## 2021-02-05 PROCEDURE — 63710000001 INSULIN DETEMIR PER 5 UNITS: Performed by: FAMILY MEDICINE

## 2021-02-05 PROCEDURE — 63710000001 INSULIN ASPART PER 5 UNITS: Performed by: UROLOGY

## 2021-02-05 PROCEDURE — G0378 HOSPITAL OBSERVATION PER HR: HCPCS

## 2021-02-05 PROCEDURE — A9270 NON-COVERED ITEM OR SERVICE: HCPCS | Performed by: UROLOGY

## 2021-02-05 PROCEDURE — 63710000001 DULOXETINE 20 MG CAPSULE DELAYED-RELEASE PARTICLES: Performed by: UROLOGY

## 2021-02-05 PROCEDURE — 63710000001 BELLADONNA-OPIUM 16.2-30 MG SUPPOSITORY: Performed by: UROLOGY

## 2021-02-05 PROCEDURE — A9270 NON-COVERED ITEM OR SERVICE: HCPCS | Performed by: FAMILY MEDICINE

## 2021-02-05 PROCEDURE — 25010000002 ONDANSETRON PER 1 MG: Performed by: NURSE ANESTHETIST, CERTIFIED REGISTERED

## 2021-02-05 PROCEDURE — 99213 OFFICE O/P EST LOW 20 MIN: CPT | Performed by: FAMILY MEDICINE

## 2021-02-05 PROCEDURE — 63710000001 OXYCODONE-ACETAMINOPHEN 5-325 MG TABLET: Performed by: NURSE ANESTHETIST, CERTIFIED REGISTERED

## 2021-02-05 PROCEDURE — 88305 TISSUE EXAM BY PATHOLOGIST: CPT | Performed by: UROLOGY

## 2021-02-05 PROCEDURE — 25010000002 FENTANYL CITRATE (PF) 100 MCG/2ML SOLUTION: Performed by: NURSE ANESTHETIST, CERTIFIED REGISTERED

## 2021-02-05 PROCEDURE — 80048 BASIC METABOLIC PNL TOTAL CA: CPT | Performed by: INTERNAL MEDICINE

## 2021-02-05 PROCEDURE — 25010000003 CEFAZOLIN SODIUM-DEXTROSE 1-4 GM-%(50ML) RECONSTITUTED SOLUTION: Performed by: UROLOGY

## 2021-02-05 PROCEDURE — 25010000002 PHENYLEPHRINE PER 1 ML: Performed by: NURSE ANESTHETIST, CERTIFIED REGISTERED

## 2021-02-05 PROCEDURE — 63710000001 METFORMIN ER 750 MG TABLET SUSTAINED-RELEASE 24 HOUR: Performed by: UROLOGY

## 2021-02-05 PROCEDURE — 63710000001 GLIPIZIDE 10 MG TABLET: Performed by: UROLOGY

## 2021-02-05 PROCEDURE — A9270 NON-COVERED ITEM OR SERVICE: HCPCS | Performed by: NURSE ANESTHETIST, CERTIFIED REGISTERED

## 2021-02-05 PROCEDURE — 63710000001 HYDROCODONE-ACETAMINOPHEN 7.5-325 MG TABLET: Performed by: UROLOGY

## 2021-02-05 PROCEDURE — 63710000001 SULFAMETHOXAZOLE-TRIMETHOPRIM 800-160 MG TABLET: Performed by: UROLOGY

## 2021-02-05 PROCEDURE — 82962 GLUCOSE BLOOD TEST: CPT

## 2021-02-05 PROCEDURE — 63710000001 METOPROLOL SUCCINATE XL 50 MG TABLET SUSTAINED-RELEASE 24 HOUR: Performed by: UROLOGY

## 2021-02-05 PROCEDURE — 25010000002 PROPOFOL 10 MG/ML EMULSION: Performed by: NURSE ANESTHETIST, CERTIFIED REGISTERED

## 2021-02-05 RX ORDER — GLIPIZIDE 10 MG/1
10 TABLET ORAL
Status: DISCONTINUED | OUTPATIENT
Start: 2021-02-05 | End: 2021-02-07 | Stop reason: HOSPADM

## 2021-02-05 RX ORDER — LIDOCAINE HYDROCHLORIDE 20 MG/ML
INJECTION, SOLUTION INFILTRATION; PERINEURAL AS NEEDED
Status: DISCONTINUED | OUTPATIENT
Start: 2021-02-05 | End: 2021-02-05 | Stop reason: SURG

## 2021-02-05 RX ORDER — METFORMIN HYDROCHLORIDE 750 MG/1
750 TABLET, EXTENDED RELEASE ORAL 2 TIMES DAILY WITH MEALS
Status: DISCONTINUED | OUTPATIENT
Start: 2021-02-05 | End: 2021-02-07 | Stop reason: HOSPADM

## 2021-02-05 RX ORDER — METFORMIN HYDROCHLORIDE 750 MG/1
750 TABLET, EXTENDED RELEASE ORAL DAILY
Status: DISCONTINUED | OUTPATIENT
Start: 2021-02-05 | End: 2021-02-05 | Stop reason: SDUPTHER

## 2021-02-05 RX ORDER — INSULIN GLARGINE 100 [IU]/ML
30 INJECTION, SOLUTION SUBCUTANEOUS 2 TIMES DAILY
Status: DISCONTINUED | OUTPATIENT
Start: 2021-02-05 | End: 2021-02-06

## 2021-02-05 RX ORDER — LIDOCAINE HYDROCHLORIDE 10 MG/ML
0.5 INJECTION, SOLUTION EPIDURAL; INFILTRATION; INTRACAUDAL; PERINEURAL ONCE AS NEEDED
Status: DISCONTINUED | OUTPATIENT
Start: 2021-02-05 | End: 2021-02-05 | Stop reason: HOSPADM

## 2021-02-05 RX ORDER — PROPOFOL 10 MG/ML
VIAL (ML) INTRAVENOUS AS NEEDED
Status: DISCONTINUED | OUTPATIENT
Start: 2021-02-05 | End: 2021-02-05 | Stop reason: SURG

## 2021-02-05 RX ORDER — HYDROMORPHONE HYDROCHLORIDE 1 MG/ML
0.5 INJECTION, SOLUTION INTRAMUSCULAR; INTRAVENOUS; SUBCUTANEOUS
Status: DISCONTINUED | OUTPATIENT
Start: 2021-02-05 | End: 2021-02-05 | Stop reason: HOSPADM

## 2021-02-05 RX ORDER — HYDROCODONE BITARTRATE AND ACETAMINOPHEN 7.5; 325 MG/1; MG/1
1 TABLET ORAL EVERY 6 HOURS PRN
Status: DISCONTINUED | OUTPATIENT
Start: 2021-02-05 | End: 2021-02-07 | Stop reason: HOSPADM

## 2021-02-05 RX ORDER — SODIUM CHLORIDE 0.9 % (FLUSH) 0.9 %
10 SYRINGE (ML) INJECTION AS NEEDED
Status: DISCONTINUED | OUTPATIENT
Start: 2021-02-05 | End: 2021-02-05 | Stop reason: HOSPADM

## 2021-02-05 RX ORDER — FENTANYL CITRATE 50 UG/ML
50 INJECTION, SOLUTION INTRAMUSCULAR; INTRAVENOUS
Status: DISCONTINUED | OUTPATIENT
Start: 2021-02-05 | End: 2021-02-05 | Stop reason: HOSPADM

## 2021-02-05 RX ORDER — HYDROCODONE BITARTRATE AND ACETAMINOPHEN 5; 325 MG/1; MG/1
1 TABLET ORAL EVERY 6 HOURS PRN
Qty: 20 TABLET | Refills: 0 | Status: SHIPPED | OUTPATIENT
Start: 2021-02-05 | End: 2021-02-07 | Stop reason: SDUPTHER

## 2021-02-05 RX ORDER — HYDROCODONE BITARTRATE AND ACETAMINOPHEN 7.5; 325 MG/1; MG/1
1 TABLET ORAL EVERY 4 HOURS PRN
Status: DISCONTINUED | OUTPATIENT
Start: 2021-02-05 | End: 2021-02-07 | Stop reason: HOSPADM

## 2021-02-05 RX ORDER — MAGNESIUM HYDROXIDE 1200 MG/15ML
3000 LIQUID ORAL CONTINUOUS
Status: DISCONTINUED | OUTPATIENT
Start: 2021-02-05 | End: 2021-02-07 | Stop reason: HOSPADM

## 2021-02-05 RX ORDER — FENTANYL CITRATE 50 UG/ML
INJECTION, SOLUTION INTRAMUSCULAR; INTRAVENOUS AS NEEDED
Status: DISCONTINUED | OUTPATIENT
Start: 2021-02-05 | End: 2021-02-05 | Stop reason: SURG

## 2021-02-05 RX ORDER — MAGNESIUM HYDROXIDE 1200 MG/15ML
LIQUID ORAL AS NEEDED
Status: DISCONTINUED | OUTPATIENT
Start: 2021-02-05 | End: 2021-02-05 | Stop reason: HOSPADM

## 2021-02-05 RX ORDER — ATROPA BELLADONNA AND OPIUM 16.2; 3 MG/1; MG/1
30 SUPPOSITORY RECTAL DAILY PRN
Status: DISCONTINUED | OUTPATIENT
Start: 2021-02-05 | End: 2021-02-07 | Stop reason: HOSPADM

## 2021-02-05 RX ORDER — ONDANSETRON 2 MG/ML
4 INJECTION INTRAMUSCULAR; INTRAVENOUS EVERY 6 HOURS PRN
Status: DISCONTINUED | OUTPATIENT
Start: 2021-02-05 | End: 2021-02-07 | Stop reason: HOSPADM

## 2021-02-05 RX ORDER — OXYCODONE HYDROCHLORIDE AND ACETAMINOPHEN 5; 325 MG/1; MG/1
1 TABLET ORAL ONCE AS NEEDED
Status: COMPLETED | OUTPATIENT
Start: 2021-02-05 | End: 2021-02-05

## 2021-02-05 RX ORDER — LIDOCAINE HYDROCHLORIDE 20 MG/ML
10 INJECTION, SOLUTION INFILTRATION; PERINEURAL CONTINUOUS
Status: DISCONTINUED | OUTPATIENT
Start: 2021-02-05 | End: 2021-02-07 | Stop reason: HOSPADM

## 2021-02-05 RX ORDER — METOPROLOL SUCCINATE 50 MG/1
50 TABLET, EXTENDED RELEASE ORAL 2 TIMES DAILY
Status: DISCONTINUED | OUTPATIENT
Start: 2021-02-05 | End: 2021-02-07 | Stop reason: HOSPADM

## 2021-02-05 RX ORDER — ATROPA BELLADONNA AND OPIUM 16.2; 3 MG/1; MG/1
SUPPOSITORY RECTAL AS NEEDED
Status: DISCONTINUED | OUTPATIENT
Start: 2021-02-05 | End: 2021-02-05 | Stop reason: HOSPADM

## 2021-02-05 RX ORDER — NALOXONE HCL 0.4 MG/ML
0.1 VIAL (ML) INJECTION
Status: DISCONTINUED | OUTPATIENT
Start: 2021-02-05 | End: 2021-02-07 | Stop reason: HOSPADM

## 2021-02-05 RX ORDER — SODIUM CHLORIDE 9 MG/ML
40 INJECTION, SOLUTION INTRAVENOUS AS NEEDED
Status: DISCONTINUED | OUTPATIENT
Start: 2021-02-05 | End: 2021-02-05 | Stop reason: HOSPADM

## 2021-02-05 RX ORDER — ATORVASTATIN CALCIUM 20 MG/1
20 TABLET, FILM COATED ORAL DAILY
Status: DISCONTINUED | OUTPATIENT
Start: 2021-02-05 | End: 2021-02-07 | Stop reason: HOSPADM

## 2021-02-05 RX ORDER — DEXTROSE MONOHYDRATE 25 G/50ML
25 INJECTION, SOLUTION INTRAVENOUS
Status: DISCONTINUED | OUTPATIENT
Start: 2021-02-05 | End: 2021-02-07 | Stop reason: HOSPADM

## 2021-02-05 RX ORDER — SODIUM CHLORIDE, SODIUM LACTATE, POTASSIUM CHLORIDE, CALCIUM CHLORIDE 600; 310; 30; 20 MG/100ML; MG/100ML; MG/100ML; MG/100ML
9 INJECTION, SOLUTION INTRAVENOUS CONTINUOUS
Status: DISCONTINUED | OUTPATIENT
Start: 2021-02-05 | End: 2021-02-07 | Stop reason: HOSPADM

## 2021-02-05 RX ORDER — SODIUM CHLORIDE 0.9 % (FLUSH) 0.9 %
10 SYRINGE (ML) INJECTION EVERY 12 HOURS SCHEDULED
Status: DISCONTINUED | OUTPATIENT
Start: 2021-02-05 | End: 2021-02-05 | Stop reason: HOSPADM

## 2021-02-05 RX ORDER — FAMOTIDINE 10 MG/ML
20 INJECTION, SOLUTION INTRAVENOUS
Status: COMPLETED | OUTPATIENT
Start: 2021-02-05 | End: 2021-02-05

## 2021-02-05 RX ORDER — BISACODYL 5 MG/1
10 TABLET, DELAYED RELEASE ORAL DAILY PRN
Status: DISCONTINUED | OUTPATIENT
Start: 2021-02-05 | End: 2021-02-07 | Stop reason: HOSPADM

## 2021-02-05 RX ORDER — ONDANSETRON 2 MG/ML
4 INJECTION INTRAMUSCULAR; INTRAVENOUS ONCE AS NEEDED
Status: DISCONTINUED | OUTPATIENT
Start: 2021-02-05 | End: 2021-02-05 | Stop reason: HOSPADM

## 2021-02-05 RX ORDER — SODIUM CHLORIDE 9 MG/ML
50 INJECTION, SOLUTION INTRAVENOUS CONTINUOUS
Status: DISCONTINUED | OUTPATIENT
Start: 2021-02-05 | End: 2021-02-07 | Stop reason: HOSPADM

## 2021-02-05 RX ORDER — HYDROMORPHONE HYDROCHLORIDE 1 MG/ML
0.25 INJECTION, SOLUTION INTRAMUSCULAR; INTRAVENOUS; SUBCUTANEOUS
Status: DISCONTINUED | OUTPATIENT
Start: 2021-02-05 | End: 2021-02-05 | Stop reason: HOSPADM

## 2021-02-05 RX ORDER — ONDANSETRON 2 MG/ML
4 INJECTION INTRAMUSCULAR; INTRAVENOUS ONCE AS NEEDED
Status: COMPLETED | OUTPATIENT
Start: 2021-02-05 | End: 2021-02-05

## 2021-02-05 RX ORDER — DULOXETIN HYDROCHLORIDE 20 MG/1
20 CAPSULE, DELAYED RELEASE ORAL DAILY
Status: DISCONTINUED | OUTPATIENT
Start: 2021-02-05 | End: 2021-02-07 | Stop reason: HOSPADM

## 2021-02-05 RX ORDER — LANSOPRAZOLE
30 KIT EVERY MORNING
Status: DISCONTINUED | OUTPATIENT
Start: 2021-02-06 | End: 2021-02-06

## 2021-02-05 RX ORDER — NICOTINE POLACRILEX 4 MG
15 LOZENGE BUCCAL
Status: DISCONTINUED | OUTPATIENT
Start: 2021-02-05 | End: 2021-02-07 | Stop reason: HOSPADM

## 2021-02-05 RX ORDER — SULFAMETHOXAZOLE AND TRIMETHOPRIM 800; 160 MG/1; MG/1
1 TABLET ORAL EVERY 12 HOURS SCHEDULED
Status: DISCONTINUED | OUTPATIENT
Start: 2021-02-05 | End: 2021-02-07 | Stop reason: HOSPADM

## 2021-02-05 RX ORDER — HYDROMORPHONE HCL 110MG/55ML
0.5 PATIENT CONTROLLED ANALGESIA SYRINGE INTRAVENOUS
Status: DISCONTINUED | OUTPATIENT
Start: 2021-02-05 | End: 2021-02-07 | Stop reason: HOSPADM

## 2021-02-05 RX ORDER — SORBITOL 30 G/1000ML
IRRIGANT IRRIGATION AS NEEDED
Status: DISCONTINUED | OUTPATIENT
Start: 2021-02-05 | End: 2021-02-05 | Stop reason: HOSPADM

## 2021-02-05 RX ORDER — ISOSORBIDE DINITRATE 30 MG/1
60 TABLET ORAL DAILY
Status: DISCONTINUED | OUTPATIENT
Start: 2021-02-05 | End: 2021-02-07 | Stop reason: HOSPADM

## 2021-02-05 RX ORDER — CEFAZOLIN SODIUM 1 G/50ML
1 SOLUTION INTRAVENOUS ONCE
Status: COMPLETED | OUTPATIENT
Start: 2021-02-05 | End: 2021-02-05

## 2021-02-05 RX ORDER — SODIUM CHLORIDE, SODIUM LACTATE, POTASSIUM CHLORIDE, CALCIUM CHLORIDE 600; 310; 30; 20 MG/100ML; MG/100ML; MG/100ML; MG/100ML
9 INJECTION, SOLUTION INTRAVENOUS CONTINUOUS PRN
Status: DISCONTINUED | OUTPATIENT
Start: 2021-02-05 | End: 2021-02-05 | Stop reason: HOSPADM

## 2021-02-05 RX ADMIN — LIDOCAINE HYDROCHLORIDE 10 ML: 20 INJECTION, SOLUTION INFILTRATION; PERINEURAL at 17:00

## 2021-02-05 RX ADMIN — PHENYLEPHRINE HYDROCHLORIDE 100 MCG: 10 INJECTION, SOLUTION INTRAMUSCULAR; INTRAVENOUS; SUBCUTANEOUS at 12:42

## 2021-02-05 RX ADMIN — ONDANSETRON 4 MG: 2 INJECTION, SOLUTION INTRAMUSCULAR; INTRAVENOUS at 11:01

## 2021-02-05 RX ADMIN — FENTANYL CITRATE 25 MCG: 50 INJECTION, SOLUTION INTRAMUSCULAR; INTRAVENOUS at 12:36

## 2021-02-05 RX ADMIN — SODIUM CHLORIDE 50 ML/HR: 9 INJECTION, SOLUTION INTRAVENOUS at 14:58

## 2021-02-05 RX ADMIN — SODIUM CHLORIDE, POTASSIUM CHLORIDE, SODIUM LACTATE AND CALCIUM CHLORIDE 9 ML/HR: 600; 310; 30; 20 INJECTION, SOLUTION INTRAVENOUS at 11:00

## 2021-02-05 RX ADMIN — OXYCODONE HYDROCHLORIDE AND ACETAMINOPHEN 1 TABLET: 5; 325 TABLET ORAL at 13:39

## 2021-02-05 RX ADMIN — FAMOTIDINE 20 MG: 10 INJECTION, SOLUTION INTRAVENOUS at 11:01

## 2021-02-05 RX ADMIN — ATROPA BELLADONNA AND OPIUM 30 MG: 16.2; 3 SUPPOSITORY RECTAL at 17:11

## 2021-02-05 RX ADMIN — INSULIN ASPART 7 UNITS: 100 INJECTION, SOLUTION INTRAVENOUS; SUBCUTANEOUS at 17:10

## 2021-02-05 RX ADMIN — HYDROCODONE BITARTRATE AND ACETAMINOPHEN 1 TABLET: 7.5; 325 TABLET ORAL at 18:19

## 2021-02-05 RX ADMIN — INSULIN DETEMIR 30 UNITS: 100 INJECTION, SOLUTION SUBCUTANEOUS at 22:28

## 2021-02-05 RX ADMIN — DULOXETINE 20 MG: 20 CAPSULE, DELAYED RELEASE ORAL at 16:58

## 2021-02-05 RX ADMIN — CEFAZOLIN SODIUM 1 G: 1 SOLUTION INTRAVENOUS at 12:03

## 2021-02-05 RX ADMIN — LIDOCAINE HYDROCHLORIDE 100 MG: 20 INJECTION, SOLUTION INFILTRATION; PERINEURAL at 12:03

## 2021-02-05 RX ADMIN — METFORMIN HYDROCHLORIDE 750 MG: 750 TABLET ORAL at 17:01

## 2021-02-05 RX ADMIN — METOPROLOL SUCCINATE 50 MG: 50 TABLET, EXTENDED RELEASE ORAL at 21:22

## 2021-02-05 RX ADMIN — SULFAMETHOXAZOLE AND TRIMETHOPRIM 160 MG: 800; 160 TABLET ORAL at 16:58

## 2021-02-05 RX ADMIN — GLIPIZIDE 10 MG: 10 TABLET ORAL at 16:58

## 2021-02-05 RX ADMIN — FENTANYL CITRATE 50 MCG: 50 INJECTION, SOLUTION INTRAMUSCULAR; INTRAVENOUS at 12:03

## 2021-02-05 RX ADMIN — SULFAMETHOXAZOLE AND TRIMETHOPRIM 160 MG: 800; 160 TABLET ORAL at 21:22

## 2021-02-05 RX ADMIN — SODIUM CHLORIDE 3000 ML: 900 IRRIGANT IRRIGATION at 21:53

## 2021-02-05 RX ADMIN — FENTANYL CITRATE 50 MCG: 50 INJECTION, SOLUTION INTRAMUSCULAR; INTRAVENOUS at 12:18

## 2021-02-05 RX ADMIN — PROPOFOL 150 MG: 10 INJECTION, EMULSION INTRAVENOUS at 12:03

## 2021-02-05 NOTE — PLAN OF CARE
Goal Outcome Evaluation:     Progress: improving  Outcome Summary: Post op admit ,TURP with continuous bladder irrigation (N/S) , V/S stable , tolerated diet well ,good pain control. Jesus patent with light pink/clear urine.

## 2021-02-05 NOTE — OP NOTE
Preoperative diagnosis refractory BPH with outlet obstruction incomplete bladder emptying    Postoperative diagnosis same bilateral lobe prostate obstruction high riding bladder neck    Procedure cystoscopy transurethral section of the prostate    Surgeon Rg    Anesthesia General    Procedure note 70-year-old gentleman with progressive outflow obstructive complaint secondary to prostatic obstruction refractory to alpha blockers to undergo the above-mentioned procedure he also has phimosis posthitis which he uses Mycolog cream and states this has not been a problem options discussed with him regarding his prostate obstruction to undergo above-mentioned procedure procedure attendant risk of been discussed problem to infection bleeding incontinence urinary retention incomplete bladder emptying bleeding especially with the Plavix that he is been off of for about 8 days    Site was marked antibiotics were given timeout was taken Covid precautions after adequate general anesthesia was placed very carefully modified dorsolithotomy position all pressure points lead prepped draped sterile fashion genitalia 2% intraurethral lidocaine jelly is administered Anastacio dilation was done the 28 Jordanian 24 Jordanian continuous-flow resectoscope sheath was placed into the bladder moderate trabeculations orifices were patent with clear reflux normal position figuration the bladder tumors were seen our initial resection carried down onto the anterior portion the prostate staying proximal to the distal portion of verumontanum down to the surgical capsule left and right lateral lobe resected exact same fashion fine before the prostate was then resected after adequate resection curettings were irrigated free from the bladder and sent to pathology bleeders were controlled by pinpoint Bovie cautery incisions were made at high riding bladder neck at the 5 and 7:00 positions away from a orifice ease after adequate incisions have been  performed no signs of active bleeding were appreciable during while the patient was normotensive scope was removed and a 22 Wolof hematuria catheter with 40 cc in balloon was in place to continuous irrigation with a clear to light pink return patient procedure well was sent to the recovery room in satisfactory condition.  BNO suppository was placed per rectum    Disposition he will be kept in the hospital until his urine is clear on its own without irrigation voiding trial accordingly outpatient instruction sheet my with limitations regarding activity diet and to maintain off his Plavix and blood thinners until his urine is clear for 1 week anticipation is to be home on antibiotic  approximately 3 days and if necessary pain medication and nausea medication    Instruction sheet will be given on discharge I outlined his postoperative care with both the patient and his wife before surgery my office will call for follow-up

## 2021-02-05 NOTE — PROGRESS NOTES
Dr Diez TURP Discharge Instructions    Remember that the raw area inside your body from which the prostate gland was removed will take many weeks to heal completely. AVOID stresses and strains that might damage the raw surface, including avoidance of pushing, pulling, straining and lifting. Maintain your normal diet. Here are some suggestions:    1) Eat and drink what you like, but use alcohol, soft drinks and spicy foods in moderation.  2) Keep your urine flowing freely by drinking plenty of water during the day (12-14 glasses), preferably before 8 p.m.  3) Avoid straining during bowel movements. It would be better to take a mild laxative such as Milk of Magnesia, 2 tablespoons once or twice a day to keep your bowels open.  4) Avoid any sudden exertion or anything that would raise your blood pressure unduly high. You may drive, but only for short distances, after three weeks and be careful not to get yourself into the predicament where you will have to hold your urine back. It is unwise to restrain your urination. Long distance travel should be avoided until six weeks after surgery.  5) You may take walks, but not unduly long ones.  6) Excessive stair climbing should be avoided.  7) No heavy lifting ( less than 5lbs for 6 weeks)  8) No sexual intercourse until complete healing has taken place ( 6 weeks)  9) If blood shows in the urine, go to bed and rest quietly, force fluids. Call me if bleeding persists, or if clots form which might obstruct the urethra.   10) Be sure to take all medications that have been prescribed for you and keep your appointments with me for postoperative check-ups.  11) RAVINDER Diez's office will call for follow-up appointment.  12) Continue medications: Except no Plavix until the urine is completely clear for 1 week    13) Do not take aspirin or arthritis medications; only Tylenol until urine clear for 1 week    Feel free to call me at any time if you have additional questions  [phone]

## 2021-02-05 NOTE — BRIEF OP NOTE
CYSTOSCOPY TRANSURETHRAL RESECTION OF PROSTATE  Progress Note    Jett Flower  2/5/2021    Pre-op Diagnosis: BPH with urinary retention       Post-Op Diagnosis Codes:  Same bilateral lobe prostate obstruction high riding bladder neck    Procedure/CPT® Codes:        Procedure(s):  CYSTOSCOPY TRANSURETHRAL RESECTION OF PROSTATE incision of bladder neck    Surgeon(s):  Jamar Diez MD    Anesthesia: General    Staff:   Circulator: Cristina Monroe RN  Scrub Person: Gloria Salazar         Estimated Blood Loss: 100ml    Urine Voided: * No values recorded between 2/5/2021 11:57 AM and 2/5/2021 12:55 PM *    Specimens:                Specimens     ID Source Type Tests Collected By Collected At Frozen?      A Prostate Tissue · TISSUE PATHOLOGY EXAM   Jamar Diez MD 2/5/21 1223      Description: Prostate chips    This specimen was not marked as sent.                Drains: * No LDAs found *    Findings: As above    Complications: None          Jamar Diez MD     Date: 2/5/2021  Time: 13:00 EST

## 2021-02-05 NOTE — ANESTHESIA PROCEDURE NOTES
Airway  Urgency: elective    Date/Time: 2/5/2021 12:03 PM    General Information and Staff    Patient location during procedure: OR    Indications and Patient Condition  Indications for airway management: airway protection    Preoxygenated: yes  MILS maintained throughout  Mask difficulty assessment: 0 - not attempted    Final Airway Details  Final airway type: supraglottic airway      Successful airway: classic  Size 5    Number of attempts at approach: 1  Assessment: lips, teeth, and gum same as pre-op and atraumatic intubation              
hip pain/knee pain/injury

## 2021-02-05 NOTE — ANESTHESIA PREPROCEDURE EVALUATION
Anesthesia Evaluation     Patient summary reviewed and Nursing notes reviewed   no history of anesthetic complications:  NPO Solid Status: > 8 hours  NPO Liquid Status: > 8 hours           Airway   Mallampati: II  TM distance: >3 FB  Neck ROM: full  Large neck circumference  Dental - normal exam     Pulmonary     breath sounds clear to auscultation  (+) shortness of breath, sleep apnea (non compliant w/ CPAP), decreased breath sounds,     ROS comment: S/P COVID 7/20    RA = 97%  Cardiovascular   Exercise tolerance: poor (<4 METS)    ECG reviewed  PT is on anticoagulation therapy  Patient on routine beta blocker and Beta blocker given within 24 hours of surgery  Rhythm: regular  Rate: normal    (+) hypertension well controlled, CAD, cardiac stents within the past 12 months angina with exertion, CHF Diastolic >=55%, hyperlipidemia,       Neuro/Psych  (+) dizziness/light headedness (since COVID), numbness (bilat feet),     GI/Hepatic/Renal/Endo    (+) obesity,   renal disease CRI, diabetes mellitus (A1c 8.7) type 2,     Musculoskeletal     (+) back pain,       ROS comment: ankylosis spondylitis  Abdominal   (+) obese,    Substance History - negative use     OB/GYN          Other   arthritis,                    Anesthesia Plan    ASA 3     general     intravenous induction     Anesthetic plan, all risks, benefits, and alternatives have been provided, discussed and informed consent has been obtained with: patient.  Use of blood products discussed with patient  Consented to blood products.

## 2021-02-05 NOTE — H&P
[facility] History and Physical    Patient Care Team:  Trae Groves as PCP - General    Chief complaint cannot urinate    Subjective 70-year-old gent with a history of prostatic obstruction on Flomax twice daily with incomplete bladder emptying after back surgery at [facility] urgency frequency with incomplete bladder emptying poor stream hesitancy treatment options discussed undergo above-mentioned procedure    He has a history of phimosis which is been controlled with use of Mycolog cream      Patient is a history of anticoagulation therapy on Plavix stopped preoperatively        Review of Systems   No fever chills    Past Medical History:   Diagnosis Date   • Congestive cardiac failure (CMS/HCC)    • Coronary artery disease    • COVID-19 07/2020    hospitalized for 24 days   • Diabetes mellitus (CMS/[facility])    • Heart disease    • Hyperlipidemia    • Hypertension    • Shortness of breath      Past Surgical History:   Procedure Laterality Date   • APPENDECTOMY     • BACK SURGERY     • CARDIAC CATHETERIZATION N/A 5/19/2020    Procedure: Left Heart Cath;  Surgeon: Trae Kemp MD;  Location: [facility] CATH INVASIVE LOCATION;  Service: Cardiovascular;  Laterality: N/A;   • CARDIAC CATHETERIZATION N/A 5/19/2020    Procedure: Coronary angiography;  Surgeon: Trae Kemp MD;  Location: [facility] CATH INVASIVE LOCATION;  Service: Cardiovascular;  Laterality: N/A;   • CARDIAC CATHETERIZATION N/A 5/19/2020    Procedure: Left ventriculography;  Surgeon: Trae Kemp MD;  Location: [facility] CATH INVASIVE LOCATION;  Service: Cardiovascular;  Laterality: N/A;   • CARDIAC CATHETERIZATION N/A 5/19/2020    Procedure: Resting Full Cycle Ratio;  Surgeon: Trae Kemp MD;  Location: [facility] CATH INVASIVE LOCATION;  Service: Cardiovascular;  Laterality: N/A;   • CARDIAC CATHETERIZATION N/A 5/19/2020    Procedure: Stent KENNEDY coronary;  Surgeon: Trae Kemp MD;  Location: [facility]  CATH INVASIVE LOCATION;  Service: Cardiovascular;  Laterality: N/A;   • CARDIAC CATHETERIZATION N/A 5/19/2020    Procedure: Percutaneous Coronary Intervention;  Surgeon: Trae Kemp MD;  Location: [facility] CATH INVASIVE LOCATION;  Service: Cardiovascular;  Laterality: N/A;   • CARDIAC SURGERY      x2   • CERVICAL SPINE SURGERY      x4   • CYSTOSCOPY W/ LASER LITHOTRIPSY       Family History   Problem Relation Age of Onset   • Heart attack Mother    • Emphysema Father    • Heart attack Father    • Stroke Father    • Arthritis Father    • Arthritis Sister    • Sudden death Brother         shot   • No Known Problems Daughter    • No Known Problems Son    • No Known Problems Maternal Grandmother    • No Known Problems Maternal Grandfather    • Emphysema Paternal Grandmother    • Diabetes Paternal Grandfather    • No Known Problems Sister    • No Known Problems Sister    • No Known Problems Sister    • No Known Problems Sister    • No Known Problems Sister    • No Known Problems Daughter    • No Known Problems Daughter      Social History     Tobacco Use   • Smoking status: Never Smoker   • Smokeless tobacco: Never Used   Substance Use Topics   • Alcohol use: Not Currently     Frequency: Never     Comment: Caffeine use: 1-2 cokes daily   • Drug use: Never       Meds:  No medications prior to admission.       Allergies:  Patient has no known allergies.    Debilities:  None    Objective     Vital Signs     No intake or output data in the 24 hours ending 02/05/21 0754       Physical Exam:      General Appearance:    Alert, cooperative, in no acute distress   Head:    Normocephalic, without obvious abnormality, atraumatic   Eyes:            Lids and lashes normal, conjunctivae and sclerae normal, no   icterus, no pallor, corneas clear   Ears:    Ears appear intact with no abnormalities noted   Throat:   No oral lesions, no thrush, oral mucosa moist   Neck:   No adenopathy, supple, trachea midline, no thyromegaly,  no  JVD   Back:     No kyphosis present, no scoliosis present, healing surgical scars      erythema or scars, no tenderness to percussion or                   palpation,   range of motion normal   Lungs:     respirations regular, even and                  unlabored    Heart:    Regular rhythm and normal rate   Chest Wall:    No abnormalities observed   Abdomen:    no masses, no organomegaly, soft        non-tender, non-distended, no guarding, no rebound                tenderness   Rectal:     Deferred   Extremities:   Moves all extremities, no edema, no cyanosis, no             redness   Pulses:    Skin:    Lymph nodes:    Neurologic:      Results Review:    I reviewed the patient's new clinical results.  Results for orders placed or performed in visit on 02/03/21   COVID-19,[facility] IN-HOUSE,Nasal Swab No Transport Media 3-4 HR TAT - Swab, Nasal Cavity    Specimen: Nasal Cavity; Swab   Result Value Ref Range    COVID19 Not Detected Not Detected - Ref. Range   Basic Metabolic Panel    Specimen: Blood   Result Value Ref Range    Glucose 228 (H) 65 - 99 mg/dL    BUN 26 (H) 8 - 23 mg/dL    Creatinine 1.22 0.76 - 1.27 mg/dL    Sodium 137 136 - 145 mmol/L    Potassium 5.5 (H) 3.5 - 5.2 mmol/L    Chloride 105 98 - 107 mmol/L    CO2 21.4 (L) 22.0 - 29.0 mmol/L    Calcium 9.6 8.6 - 10.5 mg/dL    eGFR Non African Amer 59 (L) >60 mL/min/1.73    BUN/Creatinine Ratio 21.3 7.0 - 25.0    Anion Gap 10.6 5.0 - 15.0 mmol/L   CBC (No Diff)    Specimen: Blood   Result Value Ref Range    WBC 8.03 3.40 - 10.80 10*3/mm3    RBC 5.40 4.14 - 5.80 10*6/mm3    Hemoglobin 14.6 13.0 - 17.7 g/dL    Hematocrit 48.6 37.5 - 51.0 %    MCV 90.0 79.0 - 97.0 fL    MCH 27.0 26.6 - 33.0 pg    MCHC 30.0 (L) 31.5 - 35.7 g/dL    RDW 14.9 12.3 - 15.4 %    RDW-SD 48.3 37.0 - 54.0 fl    MPV 10.4 6.0 - 12.0 fL    Platelets 193 140 - 450 10*3/mm3   Hemoglobin A1c    Specimen: Blood   Result Value Ref Range    Hemoglobin A1C 8.70 (H) 4.80 - 5.60 %         Assessment:  BPH with urinary retention refractory to maximum medical therapy  Plan:    Cystoscopy TUR the prostate R-B-O discussed with patient understands agrees to proceed    I discussed the patients findings and my recommendations with patient and family.     Jamar Diez MD  02/05/21  07:54 EST

## 2021-02-05 NOTE — ANESTHESIA POSTPROCEDURE EVALUATION
Patient: Jett Flower    Procedure Summary     Date: 02/05/21 Room / Location:  [facility] OR 4 /  [facility]    Anesthesia Start: 1158 Anesthesia Stop: 1306    Procedure: CYSTOSCOPY TRANSURETHRAL RESECTION OF PROSTATE (N/A Urethra) Diagnosis:     Surgeon: Jamar Diez MD Provider: Tessa Couch CRNA    Anesthesia Type: general ASA Status: 3          Anesthesia Type: general    Vitals  Vitals Value Taken Time   /84 02/05/21 1320   Temp 97.8 °F (36.6 °C) 02/05/21 1314   Pulse 66 02/05/21 1326   Resp 12 02/05/21 1315   SpO2 99 % 02/05/21 1326   Vitals shown include unvalidated device data.        Post Anesthesia Care and Evaluation    Patient location during evaluation: PHASE II  Patient participation: complete - patient participated  Level of consciousness: awake  Pain score: 0  Pain management: adequate  Airway patency: patent  Anesthetic complications: No anesthetic complications  PONV Status: none  Cardiovascular status: acceptable  Respiratory status: acceptable  Hydration status: acceptable

## 2021-02-06 LAB
ANION GAP SERPL CALCULATED.3IONS-SCNC: 9.8 MMOL/L (ref 5–15)
BUN SERPL-MCNC: 27 MG/DL (ref 8–23)
BUN/CREAT SERPL: 20.3 (ref 7–25)
CALCIUM SPEC-SCNC: 8.2 MG/DL (ref 8.6–10.5)
CHLORIDE SERPL-SCNC: 105 MMOL/L (ref 98–107)
CO2 SERPL-SCNC: 21.2 MMOL/L (ref 22–29)
CREAT SERPL-MCNC: 1.33 MG/DL (ref 0.76–1.27)
DEPRECATED RDW RBC AUTO: 51.4 FL (ref 37–54)
ERYTHROCYTE [DISTWIDTH] IN BLOOD BY AUTOMATED COUNT: 15 % (ref 12.3–15.4)
GFR SERPL CREATININE-BSD FRML MDRD: 53 ML/MIN/1.73
GLUCOSE BLDC GLUCOMTR-MCNC: 204 MG/DL (ref 70–130)
GLUCOSE BLDC GLUCOMTR-MCNC: 233 MG/DL (ref 70–130)
GLUCOSE BLDC GLUCOMTR-MCNC: 252 MG/DL (ref 70–130)
GLUCOSE BLDC GLUCOMTR-MCNC: 344 MG/DL (ref 70–130)
GLUCOSE SERPL-MCNC: 183 MG/DL (ref 65–99)
HCT VFR BLD AUTO: 48 % (ref 37.5–51)
HGB BLD-MCNC: 13.9 G/DL (ref 13–17.7)
MCH RBC QN AUTO: 27.1 PG (ref 26.6–33)
MCHC RBC AUTO-ENTMCNC: 29 G/DL (ref 31.5–35.7)
MCV RBC AUTO: 93.6 FL (ref 79–97)
PLATELET # BLD AUTO: 168 10*3/MM3 (ref 140–450)
PMV BLD AUTO: 10.4 FL (ref 6–12)
POTASSIUM SERPL-SCNC: 4.8 MMOL/L (ref 3.5–5.2)
RBC # BLD AUTO: 5.13 10*6/MM3 (ref 4.14–5.8)
SODIUM SERPL-SCNC: 136 MMOL/L (ref 136–145)
WBC # BLD AUTO: 6.79 10*3/MM3 (ref 3.4–10.8)

## 2021-02-06 PROCEDURE — A9270 NON-COVERED ITEM OR SERVICE: HCPCS | Performed by: UROLOGY

## 2021-02-06 PROCEDURE — 63710000001 INSULIN DETEMIR PER 5 UNITS: Performed by: FAMILY MEDICINE

## 2021-02-06 PROCEDURE — 85027 COMPLETE CBC AUTOMATED: CPT | Performed by: UROLOGY

## 2021-02-06 PROCEDURE — 99213 OFFICE O/P EST LOW 20 MIN: CPT | Performed by: INTERNAL MEDICINE

## 2021-02-06 PROCEDURE — 63710000001 GLIPIZIDE 10 MG TABLET: Performed by: UROLOGY

## 2021-02-06 PROCEDURE — 63710000001 METOPROLOL SUCCINATE XL 50 MG TABLET SUSTAINED-RELEASE 24 HOUR: Performed by: UROLOGY

## 2021-02-06 PROCEDURE — 63710000001 DULOXETINE 20 MG CAPSULE DELAYED-RELEASE PARTICLES: Performed by: UROLOGY

## 2021-02-06 PROCEDURE — 82962 GLUCOSE BLOOD TEST: CPT

## 2021-02-06 PROCEDURE — A9270 NON-COVERED ITEM OR SERVICE: HCPCS | Performed by: FAMILY MEDICINE

## 2021-02-06 PROCEDURE — 63710000001 ATORVASTATIN 20 MG TABLET: Performed by: UROLOGY

## 2021-02-06 PROCEDURE — 80048 BASIC METABOLIC PNL TOTAL CA: CPT | Performed by: UROLOGY

## 2021-02-06 PROCEDURE — A9270 NON-COVERED ITEM OR SERVICE: HCPCS | Performed by: HOSPITALIST

## 2021-02-06 PROCEDURE — 63710000001 INSULIN ASPART PER 5 UNITS: Performed by: HOSPITALIST

## 2021-02-06 PROCEDURE — 94799 UNLISTED PULMONARY SVC/PX: CPT

## 2021-02-06 PROCEDURE — 63710000001 METFORMIN ER 750 MG TABLET SUSTAINED-RELEASE 24 HOUR: Performed by: UROLOGY

## 2021-02-06 PROCEDURE — G0378 HOSPITAL OBSERVATION PER HR: HCPCS

## 2021-02-06 PROCEDURE — 63710000001 ISOSORBIDE DINITRATE 30 MG TABLET: Performed by: UROLOGY

## 2021-02-06 PROCEDURE — 63710000001 SULFAMETHOXAZOLE-TRIMETHOPRIM 800-160 MG TABLET: Performed by: UROLOGY

## 2021-02-06 PROCEDURE — 63710000001 PANTOPRAZOLE 40 MG TABLET DELAYED-RELEASE: Performed by: UROLOGY

## 2021-02-06 RX ORDER — PANTOPRAZOLE SODIUM 40 MG/1
40 TABLET, DELAYED RELEASE ORAL
Status: DISCONTINUED | OUTPATIENT
Start: 2021-02-06 | End: 2021-02-07 | Stop reason: HOSPADM

## 2021-02-06 RX ORDER — INSULIN GLARGINE 100 [IU]/ML
30 INJECTION, SOLUTION SUBCUTANEOUS 2 TIMES DAILY
Status: DISCONTINUED | OUTPATIENT
Start: 2021-02-06 | End: 2021-02-06

## 2021-02-06 RX ADMIN — SODIUM CHLORIDE 3000 ML: 900 IRRIGANT IRRIGATION at 20:04

## 2021-02-06 RX ADMIN — INSULIN ASPART 8 UNITS: 100 INJECTION, SOLUTION INTRAVENOUS; SUBCUTANEOUS at 08:35

## 2021-02-06 RX ADMIN — GLIPIZIDE 10 MG: 10 TABLET ORAL at 17:51

## 2021-02-06 RX ADMIN — PANTOPRAZOLE SODIUM 40 MG: 40 TABLET, DELAYED RELEASE ORAL at 06:51

## 2021-02-06 RX ADMIN — GLIPIZIDE 10 MG: 10 TABLET ORAL at 06:51

## 2021-02-06 RX ADMIN — LIDOCAINE HYDROCHLORIDE 10 ML: 20 INJECTION, SOLUTION INFILTRATION; PERINEURAL at 12:51

## 2021-02-06 RX ADMIN — LIDOCAINE HYDROCHLORIDE 10 ML: 20 INJECTION, SOLUTION INFILTRATION; PERINEURAL at 20:04

## 2021-02-06 RX ADMIN — SULFAMETHOXAZOLE AND TRIMETHOPRIM 160 MG: 800; 160 TABLET ORAL at 08:35

## 2021-02-06 RX ADMIN — INSULIN DETEMIR 30 UNITS: 100 INJECTION, SOLUTION SUBCUTANEOUS at 08:34

## 2021-02-06 RX ADMIN — INSULIN DETEMIR 30 UNITS: 100 INJECTION, SOLUTION SUBCUTANEOUS at 22:52

## 2021-02-06 RX ADMIN — ATORVASTATIN CALCIUM 20 MG: 20 TABLET, FILM COATED ORAL at 08:36

## 2021-02-06 RX ADMIN — LIDOCAINE HYDROCHLORIDE 10 ML: 20 INJECTION, SOLUTION INFILTRATION; PERINEURAL at 04:13

## 2021-02-06 RX ADMIN — INSULIN ASPART 5 UNITS: 100 INJECTION, SOLUTION INTRAVENOUS; SUBCUTANEOUS at 21:54

## 2021-02-06 RX ADMIN — METFORMIN HYDROCHLORIDE 750 MG: 750 TABLET ORAL at 08:36

## 2021-02-06 RX ADMIN — INSULIN ASPART 5 UNITS: 100 INJECTION, SOLUTION INTRAVENOUS; SUBCUTANEOUS at 17:51

## 2021-02-06 RX ADMIN — DULOXETINE 20 MG: 20 CAPSULE, DELAYED RELEASE ORAL at 08:36

## 2021-02-06 RX ADMIN — METFORMIN HYDROCHLORIDE 750 MG: 750 TABLET ORAL at 17:51

## 2021-02-06 RX ADMIN — SODIUM CHLORIDE 3000 ML: 900 IRRIGANT IRRIGATION at 04:10

## 2021-02-06 RX ADMIN — SODIUM CHLORIDE 3000 ML: 900 IRRIGANT IRRIGATION at 12:51

## 2021-02-06 RX ADMIN — INSULIN ASPART 10 UNITS: 100 INJECTION, SOLUTION INTRAVENOUS; SUBCUTANEOUS at 12:54

## 2021-02-06 RX ADMIN — METOPROLOL SUCCINATE 50 MG: 50 TABLET, EXTENDED RELEASE ORAL at 20:05

## 2021-02-06 RX ADMIN — METOPROLOL SUCCINATE 50 MG: 50 TABLET, EXTENDED RELEASE ORAL at 08:36

## 2021-02-06 RX ADMIN — ISOSORBIDE DINITRATE 60 MG: 30 TABLET ORAL at 08:35

## 2021-02-06 RX ADMIN — PANTOPRAZOLE SODIUM 40 MG: 40 TABLET, DELAYED RELEASE ORAL at 17:51

## 2021-02-06 RX ADMIN — SULFAMETHOXAZOLE AND TRIMETHOPRIM 160 MG: 800; 160 TABLET ORAL at 20:05

## 2021-02-06 RX ADMIN — SODIUM CHLORIDE 50 ML/HR: 9 INJECTION, SOLUTION INTRAVENOUS at 16:21

## 2021-02-06 NOTE — PLAN OF CARE
Goal Outcome Evaluation:     Progress: improving  Outcome Summary: patient continues on 2L O2, vss. no complaints of pain yet this shift. continuous bladder irrigation in place- urine was clear pink this AM, and has become more yellowed throughout the day. blood sugars monitored.

## 2021-02-06 NOTE — PLAN OF CARE
Goal Outcome Evaluation:  Plan of Care Reviewed With: patient     Outcome Summary: VS stable. O2 2L during night as at home. Pt refuses C-pap at home. Continues IV fluids. Continues continuous bladder irrigation. Clear pink with no clots noted. Pt up all night from [place] which he says keeps him up all night. No c/o pain since pain med given on day shift yest.

## 2021-02-06 NOTE — NURSING NOTE
Discharge Planning Assessment  [facility]     Patient Name: Jett Flower  MRN: [id number]  Today's Date: 2/6/2021    Admit Date: 2/5/2021    Discharge Needs Assessment     Row Name 02/06/21 1228       Living Environment    Lives With  spouse    Name(s) of Who Lives With Patient  cleo Smith    Current Living Arrangements  home/apartment/condo    Duration at Residence  1 year    Potentially Unsafe Housing Conditions  -- none    Primary Care Provided by  self    Provides Primary Care For  no one    Family Caregiver if Needed  spouse    Family Caregiver Names  cleo Smith    Quality of Family Relationships  unable to assess    Able to Return to Prior Arrangements  yes       Resource/Environmental Concerns    Resource/Environmental Concerns  none    Transportation Concerns  car, none       Transition Planning    Patient/Family Anticipates Transition to  home    Patient/Family Anticipated Services at Transition  none    Transportation Anticipated  family or friend will provide       Discharge Needs Assessment    Readmission Within the Last 30 Days  no previous admission in last 30 days    Current Outpatient/Agency/Support Group  -- none    Equipment Currently Used at Home  cpap;oxygen;glucometer;rollator;cane, straight;shower chair;commode;other (see comments) Walking stick    Concerns to be Addressed  medication    Anticipated Changes Related to Illness  none    Equipment Needed After Discharge  none    Outpatient/Agency/Support Group Needs  -- none    Discharge Facility/Level of Care Needs  -- none    Provided Post Acute Provider List?  Refused    Refused Provider List Comment  Pt declined    Provided Post Acute Provider Quality & Resource List?  Refused    Refused Quality and Resource List Comment  Pt declined        Discharge Plan     Row Name 02/06/21 1231       Plan    Plan  Dicharge home with wife    Patient/Family in Agreement with Plan  yes    Plan Comments  I spoke with the patient at bedside with is  "permission.  I introduced myself and explained my role.  I verified the information on the facesheet and his PCP as Dr. Groves.  Pt stated that he uses [facility] in [place] for his one time medications and that he is able to obtain his medications but is unable to afford his insulins; Lantus and Humalog.  He stated that he is currently using a [facility]. He denied having a living will and declined paperwork regarding same.  He stated that he is living in a house for the past year with his wife.  He advised that there are no steps to enter the single story home and that he is able to get into and maneuver around with the use of his walking stick.  He stated that he is independent with ADL's, has a car and drives.  His wife will pick him up at discharge.  He stated that he has a rolling walker, straight cane, \"walking stick\", shower chair, bedside commode, CPAP, glucometer and oxygen at home.  He stated that he uses oxygen at night at 2 LPM and his oxygen is provided by [facility].  He denied needing any further DME, home health or other community resources at this time.  He will discharge home with his wife and is agreeable to this discharge plan.  He had nu further questions or concerns regarding his discharge at this time. MORAES signed. CM will continue to follow.        Continued Care and Services - Admitted Since 2/5/2021    Coordination has not been started for this encounter.         Demographic Summary     Row Name 02/06/21 1228       General Information    Admission Type  observation    Arrived From  home    Required Notices Provided  Observation Status Notice    Referral Source  admission list    Reason for Consult  discharge planning    Preferred Language  English     Used During This Interaction  no       Contact Information    Permission Granted to Share Info With          Functional Status    No documentation.       Psychosocial    No documentation.   "     Abuse/Neglect    No documentation.       Legal    No documentation.       Substance Abuse    No documentation.       Patient Forms    No documentation.           Yenifer Minor RN

## 2021-02-06 NOTE — NURSING NOTE
"Continued Stay Note   [place]     Patient Name: Jett Flower  MRN: [id number]  Today's Date: 2/6/2021    Admit Date: 2/5/2021    Discharge Plan     Row Name 02/06/21 1436       Plan    Plan  Discharge home    Patient/Family in Agreement with Plan  yes    Plan Comments  I spoke with the patients wife at bedside.  Pt was sleeping.  I provided her with a [employer] card for their use and discussed with her that they should speak with the patients PCP to see if there are less expensive alternatives to the patients insulin.  She verbalized understanding.  CM will continue to follow.    Row Name 02/06/21 1231       Plan    Plan  Dicharge home with wife    Patient/Family in Agreement with Plan  yes    Plan Comments  I spoke with the patient at bedside with is permission.  I introduced myself and explained my role.  I verified the information on the facesheet and his PCP as Dr. Groves.  Pt stated that he uses [facility] in [place] for his one time medications and that he is able to obtain his medications but is unable to afford his insulins; Lantus and Humalog.  He stated that he is currently using a [facility].  He stated that he is living in a house for the past year with his wife.  He advised that there are no steps to enter the single story home and that he is able to get into and manuever around with the use of his walking stick.  He stated that he is independent with ADL's, has a car and drives.  His wife will pick him up at discharge.  He stated that he has a rolling walker, stright cane, \"walking stick\", shower chair, bedside commode, CPAP, glucometer and oxygen at home.  He stated that he uses oxygen at night at 2 LPM and his oxygen is provided by [facility].  He denied needing any further DME, home health or other community resources at this time.  He will discharge home with his wife and is agreeable to this discharge plan.  He had nu further questions or concerns regarding his discharge at " this time. CM will continue to follow.        Discharge Codes    No documentation.             Yenifer Minor RN

## 2021-02-06 NOTE — PROGRESS NOTES
"  [facility] Progress Note    Chief Complaint:  Didn't sleep well    Minimal pain  cbi still going but slow rate  No clots or cath issues  Issues with sleep due to narcs  Dr Bourgeois addressing DM    Review of Systems:    The following systems were reviewed and negative;  respiratory, cardiovascular and gastrointestinal          Vital Signs  /69 (BP Location: Right arm, Patient Position: Lying)   Pulse 66   Temp 98.3 °F (36.8 °C) (Oral)   Resp 20   Ht 162.6 cm (64.02\")   Wt 95.1 kg (209 lb 9.6 oz)   SpO2 95%   BMI 35.96 kg/m²     Physical Exam:     General Appearance:    Alert, cooperative, NAD   HEENT:    No trauma, pupils reactive, hearing intact   Back:     No CVA tenderness   Lungs:     Respirations unlabored, no wheezing    Heart:    RRR, intact peripheral pulses   Abdomen:     Soft, NDNT, no masses, no guarding   :    Penis nl   Extremities:   No edema, no deformity   Lymphatic:   No neck or groin LAD   Skin:   No bleeding, bruising or rashes   Neuro/Psych:   Orientation intact, mood/affect pleasant, no focal findings        Results Review:     I reviewed the patient's new clinical results.  Lab Results (last 24 hours)     Procedure Component Value Units Date/Time    Basic Metabolic Panel [[id number]]  (Abnormal) Collected: 02/06/21 0506    Specimen: Blood Updated: 02/06/21 0604     Glucose 183 mg/dL      BUN 27 mg/dL      Creatinine 1.33 mg/dL      Sodium 136 mmol/L      Potassium 4.8 mmol/L      Chloride 105 mmol/L      CO2 21.2 mmol/L      Calcium 8.2 mg/dL      eGFR Non African Amer 53 mL/min/1.73      BUN/Creatinine Ratio 20.3     Anion Gap 9.8 mmol/L     Narrative:      GFR Normal >60  Chronic Kidney Disease <60  Kidney Failure <15      CBC (No Diff) [[id number]]  (Abnormal) Collected: 02/06/21 0506    Specimen: Blood Updated: 02/06/21 0539     WBC 6.79 10*3/mm3      RBC 5.13 10*6/mm3      Hemoglobin 13.9 g/dL      Hematocrit 48.0 %      MCV 93.6 fL      MCH 27.1 pg      MCHC 29.0 g/dL "      RDW 15.0 %      RDW-SD 51.4 fl      MPV 10.4 fL      Platelets 168 10*3/mm3     POC Glucose Once [[id number]]  (Abnormal) Collected: 02/05/21 2120    Specimen: Blood Updated: 02/05/21 2136     Glucose 292 mg/dL     POC Glucose Once [[id number]]  (Abnormal) Collected: 02/05/21 1639    Specimen: Blood Updated: 02/05/21 1646     Glucose 301 mg/dL     POC Glucose Once [[id number]]  (Abnormal) Collected: 02/05/21 1330    Specimen: Blood Updated: 02/05/21 1336     Glucose 175 mg/dL     Tissue Pathology Exam [[id number]] Collected: 02/05/21 1223    Specimen: Tissue from Prostate Updated: 02/05/21 1314    Basic Metabolic Panel [[id number]]  (Abnormal) Collected: 02/05/21 0957    Specimen: Blood Updated: 02/05/21 1122     Glucose 240 mg/dL      BUN 28 mg/dL      Creatinine 1.14 mg/dL      Sodium 139 mmol/L      Potassium 5.4 mmol/L      Chloride 105 mmol/L      CO2 20.8 mmol/L      Calcium 8.8 mg/dL      eGFR Non African Amer 64 mL/min/1.73      BUN/Creatinine Ratio 24.6     Anion Gap 13.2 mmol/L     Narrative:      GFR Normal >60  Chronic Kidney Disease <60  Kidney Failure <15      POC Glucose Once [[id number]]  (Abnormal) Collected: 02/05/21 1042    Specimen: Blood Updated: 02/05/21 1050     Glucose 232 mg/dL         Imaging Results (Last 24 Hours)     ** No results found for the last 24 hours. **          Medication Review:   I have personally reviewed    Current Facility-Administered Medications:   •  atorvastatin (LIPITOR) tablet 20 mg, 20 mg, Oral, Daily, Jamar Diez MD  •  bisacodyl (DULCOLAX) EC tablet 10 mg, 10 mg, Oral, Daily PRN, Jamar Diez MD  •  dextrose (D50W) 25 g/ 50mL Intravenous Solution 25 g, 25 g, Intravenous, Q15 Min PRN, Jamar Diez MD  •  dextrose (GLUTOSE) oral gel 15 g, 15 g, Oral, Q15 Min PRN, Jamar Diez MD  •  DULoxetine (CYMBALTA) DR capsule 20 mg, 20 mg, Oral, Daily, Jamar Diez MD, 20 mg at 02/05/21 1650  •  glipizide (GLUCOTROL) tablet 10 mg, 10 mg, Oral, BID  AC, Jamar Diez MD, 10 mg at 02/06/21 0651  •  glucagon (GLUCAGEN) injection 1 mg, 1 mg, Subcutaneous, Q15 Min PRN, Jamar Diez MD  •  HYDROcodone-acetaminophen (NORCO) 7.5-325 MG per tablet 1 tablet, 1 tablet, Oral, Q6H PRN, Jamar Diez MD  •  HYDROcodone-acetaminophen (NORCO) 7.5-325 MG per tablet 1 tablet, 1 tablet, Oral, Q4H PRN, Jamar Diez MD, 1 tablet at 02/05/21 1819  •  HYDROmorphone (DILAUDID) injection 0.5 mg, 0.5 mg, Intravenous, Q2H PRN **AND** naloxone (NARCAN) injection 0.1 mg, 0.1 mg, Intravenous, Q5 Min PRN, Jamar Diez MD  •  insulin aspart (novoLOG) injection 0-14 Units, 0-14 Units, Subcutaneous, 4x Daily AC & at Bedtime, Meng Bourgeois MD  •  insulin detemir (LEVEMIR) injection 30 Units, 30 Units, Subcutaneous, Q12H, Meng Bourgeois MD, 30 Units at 02/05/21 2228  •  insulin glargine (LANTUS, SEMGLEE) injection 30 Units, 30 Units, Subcutaneous, BID, Jamar Diez MD  •  isosorbide dinitrate (ISORDIL) tablet 60 mg, 60 mg, Oral, Daily, Jamar Diez MD  •  lactated ringers infusion, 9 mL/hr, Intravenous, Continuous, Pipo Whyte, CRNA  •  lidocaine (XYLOCAINE) 2% injection 10 mL, 10 mL, Injection, Continuous, Jamar Diez MD, 10 mL at 02/06/21 0413  •  metFORMIN ER (GLUCOPHAGE-XR) 24 hr tablet 750 mg, 750 mg, Oral, BID With Meals, Jamar Diez MD, 750 mg at 02/05/21 1701  •  metoprolol succinate XL (TOPROL-XL) 24 hr tablet 50 mg, 50 mg, Oral, BID, Jamar Diez MD, 50 mg at 02/05/21 2122  •  ondansetron (ZOFRAN) injection 4 mg, 4 mg, Intravenous, Q6H PRN, Jamar Diez MD  •  opium-belladonna (B&O SUPPRETTES) 16.2-30 MG suppository 30 mg, 30 mg, Rectal, Daily PRN, Jamar Diez MD, 30 mg at 02/05/21 1711  •  pantoprazole (PROTONIX) EC tablet 40 mg, 40 mg, Oral, BID AC, Jamar Diez MD, 40 mg at 02/06/21 0651  •  sodium chloride (NS) irrigation solution 3,000 mL, 3,000 mL, Irrigation,  Continuous, Jamar Diez MD, 3,000 mL at 02/06/21 0410  •  sodium chloride 0.9 % infusion, 50 mL/hr, Intravenous, Continuous, Jamar Diez MD, Last Rate: 50 mL/hr at 02/05/21 1458, 50 mL/hr at 02/05/21 1458  •  sulfamethoxazole-trimethoprim (BACTRIM DS,SEPTRA DS) 800-160 MG per tablet 160 mg, 1 tablet, Oral, Q12H, Jamar Diez MD, 160 mg at 02/05/21 2122    Allergies:    Patient has no known allergies.    Assessment:    Active Problems:  Patient Active Problem List   Diagnosis   • Rectal bleeding   • Chronic renal failure   • Type 2 diabetes mellitus with circulatory disorder, with long-term current use of insulin (CMS/[facility])   • Polycythemia   • Coronary artery disease of native artery of native heart with stable angina pectoris (CMS/[facility])   • Dyspnea on exertion   • MANDEEP (obstructive sleep apnea)   • BPH with obstruction/lower urinary tract symptoms   • BPH with urinary obstruction       POD#1 TURP    Plan:    Wean CBI  Jesus out tomorrow and likely home   Thanks to Dr Christelle Cohen MD    2/6/2021  07:14 EST

## 2021-02-06 NOTE — CONSULTS
Consultation:      Patient Care Team:  Trae Groves PCP - General    CHIEF COMPLAINT: Management of diabetes, hypertension, coronary disease      HISTORY OF PRESENT ILLNESS:    70-year-old white male with BPH and urinary outflow obstruction admitted here for a TURP.  We have been asked to consult for management of his diabetes hypertension coronary disease.     Past Medical History:   Diagnosis Date   • Congestive cardiac failure (CMS/HCC)    • Coronary artery disease    • COVID-19 07/2020    hospitalized for 24 days   • Diabetes mellitus (CMS/HCC)    • Heart disease    • Hyperlipidemia    • Hypertension    • Shortness of breath      Past Surgical History:   Procedure Laterality Date   • APPENDECTOMY     • BACK SURGERY     • CARDIAC CATHETERIZATION N/A 5/19/2020    Procedure: Left Heart Cath;  Surgeon: Trae Kemp MD;  [facility];  Service: Cardiovascular;  Laterality: N/A;   • CARDIAC CATHETERIZATION N/A 5/19/2020    Procedure: Coronary angiography;  Surgeon: Trae Kemp MD;  [facility] LOC[facility];  Service: Cardiovascular;  Laterality: N/A;   • CARDIAC CATHETERIZATION N/A 5/19/2020    Procedure: Left ventriculography;  Surgeon: Trae Kemp MD;  Location: [facility] C[facility] LOC[facility];  Service: Cardiovascular;  Laterality: N/A;   • CARDIAC CATHETERIZATION N/A 5/19/2020    Procedure: Resting Full Cycle Ratio;  Surgeon: Trae Kemp MD;  Location: [facility] C[facility] LOC[facility];  Service: Cardiovascular;  Laterality: N/A;   • CARDIAC CATHETERIZATION N/A 5/19/2020    Procedure: Stent KENNEDY coronary;  Surgeon: Trae Kemp MD;  Location: [facility] C[facility] LOC[facility];  Service: Cardiovascular;  Laterality: N/A;   • CARDIAC CATHETERIZATION N/A 5/19/2020    Procedure: Percutaneous Coronary Intervention;  Surgeon: Trae Kemp MD;  Location: [facility] CATH INVAS[facility] LOC[facility];  Service: Cardiovascular;  Laterality: N/A;   • CARDIAC  SURGERY      x3   • CERVICAL SPINE SURGERY      x4   • CYSTOSCOPY W/ LASER LITHOTRIPSY       Family History   Problem Relation Age of Onset   • Heart attack Mother    • Emphysema Father    • Heart attack Father    • Stroke Father    • Arthritis Father    • Arthritis Sister    • Sudden death Brother         shot   • No Known Problems Daughter    • No Known Problems Son    • No Known Problems Maternal Grandmother    • No Known Problems Maternal Grandfather    • Emphysema Paternal Grandmother    • Diabetes Paternal Grandfather    • No Known Problems Sister    • No Known Problems Sister    • No Known Problems Sister    • No Known Problems Sister    • No Known Problems Sister    • No Known Problems Daughter    • No Known Problems Daughter      Social History     Tobacco Use   • Smoking status: Never Smoker   • Smokeless tobacco: Never Used   Substance Use Topics   • Alcohol use: Not Currently     Frequency: Never     Comment: Caffeine use: 1-2 cokes daily   • Drug use: Never     Medications Prior to Admission   Medication Sig Dispense Refill Last Dose   • aspirin 81 MG chewable tablet Chew 81 mg Daily.   1/22/2021   • clopidogrel (PLAVIX) 75 MG tablet Take 75 mg by mouth Daily.   Past Month at Unknown time   • DULoxetine (CYMBALTA) 20 MG capsule Take 20 mg by mouth Daily.   2/4/2021 at Unknown time   • glipizide (GLUCOTROL) 10 MG tablet Take 10 mg by mouth 2 (Two) Times a Day Before Meals.   2/4/2021 at Unknown time   • HYDROcodone-acetaminophen (NORCO) 7.5-325 MG per tablet Take 1 tablet by mouth Every 6 (Six) Hours As Needed for Moderate Pain .   Past Month at Unknown time   • insulin glargine (LANTUS) 100 UNIT/ML injection Inject 30 Units under the skin into the appropriate area as directed 2 (Two) Times a Day.   2/4/2021 at Unknown time   • insulin lispro (humaLOG) 100 UNIT/ML injection Inject  under the skin into the appropriate area as directed 3 (Three) Times a Day Before Meals.   2/4/2021 at Unknown time   •  "isosorbide dinitrate (ISORDIL) 30 MG tablet Take 2 tablets by mouth Daily. 180 tablet 3 2/4/2021 at Unknown time   • metFORMIN ER (GLUCOPHAGE-XR) 750 MG 24 hr tablet Take 750 mg by mouth 2 (two) times a day.   2/4/2021 at Unknown time   • metoprolol succinate XL (TOPROL-XL) 50 MG 24 hr tablet Take 50 mg by mouth 2 (Two) Times a Day.   2/4/2021 at Unknown time   • omeprazole (priLOSEC) 40 MG capsule Take 40 mg by mouth Daily.   2/5/2021 at Unknown time   • simvastatin (ZOCOR) 40 MG tablet Take 40 mg by mouth Daily.   2/4/2021 at Unknown time   • tamsulosin (FLOMAX) 0.4 MG capsule 24 hr capsule Take 1 capsule by mouth Daily.   2/4/2021 at Unknown time     Allergies:  Patient has no known allergies.     Review of Systems   Constitutional: Negative for activity change, appetite change and fatigue.   HENT: Negative for congestion.    Respiratory: Negative for cough, chest tightness, shortness of breath and wheezing.    Cardiovascular: Negative for chest pain.   Gastrointestinal: Negative for abdominal distention, abdominal pain, diarrhea, nausea and vomiting.   Endocrine: Negative for polyphagia and polyuria.   Genitourinary: Negative for frequency.   Skin: Negative for rash.   Neurological: Negative for light-headedness.   Hematological: Does not bruise/bleed easily.   Psychiatric/Behavioral: Negative for agitation and behavioral problems.       Vital Signs  Temp:  [97.4 °F (36.3 °C)-97.8 °F (36.6 °C)] 97.7 °F (36.5 °C)  Heart Rate:  [58-79] 71  Resp:  [12-20] 17  BP: (106-175)/(66-95) 146/74  Oxygen Therapy  SpO2: 92 %  Pulse Oximetry Type: Continuous  Device (Oxygen Therapy): nasal cannula with ETCO2  Flow (L/min): 1  Oxygen Concentration (%): 21  ETCO2 (mmHg): 41 mmHg}  Body mass index is 35.96 kg/m².  Flowsheet Rows      First Filed Value   Admission Height  162.6 cm (64.02\") Documented at 02/05/2021 1008   Admission Weight  95.1 kg (209 lb 9.6 oz) Documented at 02/05/2021 1008                 Physical Exam  Vitals " signs and nursing note reviewed.   Constitutional:       Appearance: He is well-developed. He is obese.   HENT:      Head: Normocephalic.   Eyes:      Conjunctiva/sclera: Conjunctivae normal.   Neck:      Musculoskeletal: Normal range of motion.      Thyroid: No thyromegaly.      Vascular: No JVD.   Cardiovascular:      Rate and Rhythm: Normal rate and regular rhythm.      Heart sounds: Normal heart sounds. No murmur.   Pulmonary:      Effort: Pulmonary effort is normal. No respiratory distress.      Breath sounds: Normal breath sounds. No wheezing or rales.   Abdominal:      General: Bowel sounds are normal. There is no distension.      Palpations: Abdomen is soft.      Tenderness: There is no abdominal tenderness. There is no guarding.   Skin:     General: Skin is warm and dry.      Findings: No rash.   Neurological:      Mental Status: He is alert.          Debilities/Disabilities Identified: None    Emotional Behavior: Appropriate    Results Review:    I reviewed the patient's new clinical results.  Lab Results (most recent)     Procedure Component Value Units Date/Time    POC Glucose Once [[id number]]  (Abnormal) Collected: 02/05/21 2120    Specimen: Blood Updated: 02/05/21 2136     Glucose 292 mg/dL     POC Glucose Once [[id number]]  (Abnormal) Collected: 02/05/21 1639    Specimen: Blood Updated: 02/05/21 1646     Glucose 301 mg/dL     Tissue Pathology Exam [[id number]] Collected: 02/05/21 1223    Specimen: Tissue from Prostate Updated: 02/05/21 1314    Basic Metabolic Panel [[id number]]  (Abnormal) Collected: 02/05/21 0957    Specimen: Blood Updated: 02/05/21 1122     Glucose 240 mg/dL      BUN 28 mg/dL      Creatinine 1.14 mg/dL      Sodium 139 mmol/L      Potassium 5.4 mmol/L      Chloride 105 mmol/L      CO2 20.8 mmol/L      Calcium 8.8 mg/dL      eGFR Non African Amer 64 mL/min/1.73      BUN/Creatinine Ratio 24.6     Anion Gap 13.2 mmol/L     Narrative:      GFR Normal >60  Chronic Kidney Disease <60  Kidney  Failure <15            Imaging Results (Most Recent)     Procedure Component Value Units Date/Time    SCANNED - IMAGING [[id number]] Resulted: 02/05/21      Updated: 02/03/21 1410        reviewed previous chest x-ray preop    ECG/EMG Results (most recent)     None        reviewed EKG and echocardiogram    Assessment/Plan       1.  BPH with urinary outflow obstruction status post TURP postop doing well without any complaints    2.  Adult-onset diabetes mellitus insulin-dependent we will continue with home Lantus and sliding scale insulin.  A1c will be ordered    3.  Coronary disease status post KENNEDY stent to the LAD in May 2020 stable nothing acute to do home medications    4.  Hypertension stable continue present medications and monitor      5.  Chronic kidney disease stage III at baseline continue monitor renal functions    6.  Obstructive sleep apnea continue with home CPAP    7.  DVT prophylaxis per urology      I discussed the patients findings and my recommendations with patient      Meng Bourgeois MD  02/05/21  22:38 EST      Much of this encounter note is an electronic transcription/translation of spoken language to printed text using Dragon Software

## 2021-02-06 NOTE — PROGRESS NOTES
"    [facility] HOSPITALIST TEAM   PROGRESS NOTE      Patient Care Team:  Trae Groves as PCP - General    Patient seen at bedside    Hospitalist Team      Patient Care Team:  Provider, No Known as PCP - General          Subjective      Presenting History and Chief Complaints:      Chief Complaint:      Inability to urinate    Admission History:    Mr Jett Flower is a 70 year old  male who was admitted yesterday by Dr Cohen with BPH and urinary retention. Hospitalist service was contacted for monitoring his HTN and diabetes while he was hospitalized here at [facility]    Interval History and ROS:     Patient States He has a Jesus catheter in and does not have any problems  Patient Complaints: As above  Patient Denies:  Any sx of fever, chest pain, shortness of breath, abdominal pain or n/v  History taken from:       Objective    Vital Signs  Temp:  [97.4 °F (36.3 °C)-98.3 °F (36.8 °C)] 98.3 °F (36.8 °C)  Heart Rate:  [58-79] 66  Resp:  [12-22] 20  BP: (106-175)/(66-95) 128/69    Flowsheet Rows      First Filed Value   Admission Height  162.6 cm (64.02\") Documented at 02/05/2021 1008   Admission Weight  95.1 kg (209 lb 9.6 oz) Documented at 02/05/2021 1008              PHYSICAL EXAMINATION:      Physical Exam   Vital Signs - As documented above  General - NAD, sitting up in bed having breakfast  HEENT - Normocephalic, PERRLA, EOM intact   Neck - No noticeable or palpable swelling, redness or rash around throat or on face   Cardiovascular - RRR no m/r/g, no JVD, no carotid bruits   Lungs - Clear to auscltation, no use of acessory muscles, no crackles or wheezes.   Skin - No rashes, skin warm and dry, no erythematous areas    Abdomen - Obese, Soft and nontender, not distended, normal bowel sounds    Extremeties - No edema, cyanosis or clubbing   Musculo Skeletal - 5/5 strength, normal range of motion, no swollen or erythematous   joints.   Psychiatry - Responds appropriately to questions. No " evidence of acute anxiety, depression or hallucinations  Neurological - Alert and oriented x 3, CN 2-12 grossly intact.         Results Review:     I reviewed the patient's new clinical results.    Lab Results (last 24 hours)     Procedure Component Value Units Date/Time    POC Glucose Once [[id number]]  (Abnormal) Collected: 02/06/21 0749    Specimen: Blood Updated: 02/06/21 0756     Glucose 252 mg/dL     Basic Metabolic Panel [[id number]]  (Abnormal) Collected: 02/06/21 0506    Specimen: Blood Updated: 02/06/21 0604     Glucose 183 mg/dL      BUN 27 mg/dL      Creatinine 1.33 mg/dL      Sodium 136 mmol/L      Potassium 4.8 mmol/L      Chloride 105 mmol/L      CO2 21.2 mmol/L      Calcium 8.2 mg/dL      eGFR Non African Amer 53 mL/min/1.73      BUN/Creatinine Ratio 20.3     Anion Gap 9.8 mmol/L     Narrative:      GFR Normal >60  Chronic Kidney Disease <60  Kidney Failure <15      CBC (No Diff) [[id number]]  (Abnormal) Collected: 02/06/21 0506    Specimen: Blood Updated: 02/06/21 0539     WBC 6.79 10*3/mm3      RBC 5.13 10*6/mm3      Hemoglobin 13.9 g/dL      Hematocrit 48.0 %      MCV 93.6 fL      MCH 27.1 pg      MCHC 29.0 g/dL      RDW 15.0 %      RDW-SD 51.4 fl      MPV 10.4 fL      Platelets 168 10*3/mm3     POC Glucose Once [[id number]]  (Abnormal) Collected: 02/05/21 2120    Specimen: Blood Updated: 02/05/21 2136     Glucose 292 mg/dL     POC Glucose Once [[id number]]  (Abnormal) Collected: 02/05/21 1639    Specimen: Blood Updated: 02/05/21 1646     Glucose 301 mg/dL     POC Glucose Once [[id number]]  (Abnormal) Collected: 02/05/21 1330    Specimen: Blood Updated: 02/05/21 1336     Glucose 175 mg/dL     Tissue Pathology Exam [[id number]] Collected: 02/05/21 1223    Specimen: Tissue from Prostate Updated: 02/05/21 1314    Basic Metabolic Panel [[id number]]  (Abnormal) Collected: 02/05/21 0957    Specimen: Blood Updated: 02/05/21 1122     Glucose 240 mg/dL      BUN 28 mg/dL      Creatinine 1.14 mg/dL      Sodium 139  mmol/L      Potassium 5.4 mmol/L      Chloride 105 mmol/L      CO2 20.8 mmol/L      Calcium 8.8 mg/dL      eGFR Non African Amer 64 mL/min/1.73      BUN/Creatinine Ratio 24.6     Anion Gap 13.2 mmol/L     Narrative:      GFR Normal >60  Chronic Kidney Disease <60  Kidney Failure <15      POC Glucose Once [[id number]]  (Abnormal) Collected: 02/05/21 1042    Specimen: Blood Updated: 02/05/21 1050     Glucose 232 mg/dL           Imaging Results (Last 24 Hours)     ** No results found for the last 24 hours. **          Xray reviewed personally by physician.      ECG reviewed personally by physician  ECG/EMG Results (most recent)     None          Medication Review:   I have reviewed the patient's current medication list    Current Facility-Administered Medications:   •  atorvastatin (LIPITOR) tablet 20 mg, 20 mg, Oral, Daily, Jamar Diez MD  •  bisacodyl (DULCOLAX) EC tablet 10 mg, 10 mg, Oral, Daily PRN, Jamar Diez MD  •  dextrose (D50W) 25 g/ 50mL Intravenous Solution 25 g, 25 g, Intravenous, Q15 Min PRN, Jamar Diez MD  •  dextrose (GLUTOSE) oral gel 15 g, 15 g, Oral, Q15 Min PRN, Jamar Diez MD  •  DULoxetine (CYMBALTA) DR capsule 20 mg, 20 mg, Oral, Daily, Jamar Diez MD, 20 mg at 02/05/21 1658  •  glipizide (GLUCOTROL) tablet 10 mg, 10 mg, Oral, BID AC, Jamar Diez MD, 10 mg at 02/06/21 0651  •  glucagon (GLUCAGEN) injection 1 mg, 1 mg, Subcutaneous, Q15 Min PRN, Jamar Diez MD  •  HYDROcodone-acetaminophen (NORCO) 7.5-325 MG per tablet 1 tablet, 1 tablet, Oral, Q6H PRN, Jamar Diez MD  •  HYDROcodone-acetaminophen (NORCO) 7.5-325 MG per tablet 1 tablet, 1 tablet, Oral, Q4H PRN, Jamar Diez MD, 1 tablet at 02/05/21 1819  •  HYDROmorphone (DILAUDID) injection 0.5 mg, 0.5 mg, Intravenous, Q2H PRN **AND** naloxone (NARCAN) injection 0.1 mg, 0.1 mg, Intravenous, Q5 Min PRN, Jamar Diez MD  •  insulin aspart (novoLOG) injection 0-14 Units, 0-14  Units, Subcutaneous, 4x Daily AC & at Bedtime, Ben Chin MD  •  insulin detemir (LEVEMIR) injection 30 Units, 30 Units, Subcutaneous, Q12H, Meng Bourgeois MD, 30 Units at 02/05/21 2228  •  insulin glargine (LANTUS, SEMGLEE) injection 30 Units, 30 Units, Subcutaneous, BID, Jamar Diez MD  •  isosorbide dinitrate (ISORDIL) tablet 60 mg, 60 mg, Oral, Daily, Jamar Diez MD  •  lactated ringers infusion, 9 mL/hr, Intravenous, Continuous, Pipo Whyte CRNA  •  lidocaine (XYLOCAINE) 2% injection 10 mL, 10 mL, Injection, Continuous, Jamar Diez MD, 10 mL at 02/06/21 0413  •  metFORMIN ER (GLUCOPHAGE-XR) 24 hr tablet 750 mg, 750 mg, Oral, BID With Meals, Jamar Diez MD, 750 mg at 02/05/21 1701  •  metoprolol succinate XL (TOPROL-XL) 24 hr tablet 50 mg, 50 mg, Oral, BID, Jamar Diez MD, 50 mg at 02/05/21 2122  •  ondansetron (ZOFRAN) injection 4 mg, 4 mg, Intravenous, Q6H PRN, Jamar Diez MD  •  opium-belladonna (B&O SUPPRETTES) 16.2-30 MG suppository 30 mg, 30 mg, Rectal, Daily PRN, Jamar Diez MD, 30 mg at 02/05/21 1711  •  pantoprazole (PROTONIX) EC tablet 40 mg, 40 mg, Oral, BID AC, Jamar Diez MD, 40 mg at 02/06/21 0651  •  sodium chloride (NS) irrigation solution 3,000 mL, 3,000 mL, Irrigation, Continuous, Jamar Diez MD, 3,000 mL at 02/06/21 0410  •  sodium chloride 0.9 % infusion, 50 mL/hr, Intravenous, Continuous, Jamar Diez MD, Last Rate: 50 mL/hr at 02/05/21 1458, 50 mL/hr at 02/05/21 1458  •  sulfamethoxazole-trimethoprim (BACTRIM DS,SEPTRA DS) 800-160 MG per tablet 160 mg, 1 tablet, Oral, Q12H, Jamar Diez MD, 160 mg at 02/05/21 2122      Assessment/Plan           PLAN:    -Labs and diagnostic tests reviewed: YES    -Diagnostic tests reviewed: YES    -Any new recommendations: To continue current management    -New Labs ordered: CBC, CMP    -New diagnostic tests ordered: N/A    -Any changes in  medications:  MEDICATION CHANGES:   New Medications added:As per order sheet   Medication Dose changed:N/A   Medications deleted:N/A    -Placement issues: N/A    -Patient is clinically and hemodynamically stable    -To continue current management and supportive care    -Will follow patient closely    -Nothing new to add for right now    -Discharge planning issues: Patient should be able to go back home once ready for discharge      DIAGNOSES:      PRIMARY DIAGNOSES:          HTN: Last /69. On metoprolol. Will monitor    HLD: On atorvastatin    Type II DM: On metformin, glipizide, Insulin glargine and Insulin Lispro. Last blood glucose 252.    CAD: status post KENNEDY stent to the LAD in May 2020 stable nothing acute to do home medications. On isosorbide mononitrate, ASA and clopidogrel at home    CKD: Serum creatinine 1.33 with GFR 53 (G3a)    GERD: On Pantoprazole    Depression: On Duloxetine    MANDEEP: On CPAP at home    Hx of COVID 19: Positive 07/2020. Hospitalized for 24 days    BPH with urinary retention: On Tamsulosin. On Dr Cohen's service with indwelling Jesus in place    DJD: On Hydrocodone for pain control              Anthropometric Analysis: BMI:  35.96 (Obesity Class II)      CODE Status: FULL CODE    Tobacco use: N/A    Alcohol intake: N/A    Illegal Drug use: N/A    DVT Prophylaxis: SCDs          ?     SECONDARY DIAGNOSES:  ?     As above      SURGICAL DIAGNOSES:      As per Problem List      TODAY'S DISCHARGE:        N/A          Plan for disposition: Patient should be able to go back to Home once ready for discharge    Eduardo Holly MD  02/06/21  08:24 EST            Eduardo Holly M.D.; MS; FACP; MPH; GIDEON  Internal Medicine/ Hospitalist          Time:  30  minutes

## 2021-02-06 NOTE — PLAN OF CARE
Discharge Planning Assessment  [facility]     Patient Name: Jett Flower  MRN: [id number]  Today's Date: 2/6/2021    Admit Date: 2/5/2021    Discharge Needs Assessment       Row Name 02/06/21 1228       Living Environment    Lives With  spouse    Name(s) of Who Lives With Patient  cleo Smith    Current Living Arrangements  home/apartment/condo    Duration at Residence  1 year    Potentially Unsafe Housing Conditions  -- none    Primary Care Provided by  self    Provides Primary Care For  no one    Family Caregiver if Needed  spouse    Family Caregiver Names  cleo Smith    Quality of Family Relationships  unable to assess    Able to Return to Prior Arrangements  yes       Resource/Environmental Concerns    Resource/Environmental Concerns  none    Transportation Concerns  car, none       Transition Planning    Patient/Family Anticipates Transition to  home    Patient/Family Anticipated Services at Transition  none    Transportation Anticipated  family or friend will provide       Discharge Needs Assessment    Readmission Within the Last 30 Days  no previous admission in last 30 days    Current Outpatient/Agency/Support Group  -- none    Equipment Currently Used at Home  cpap;oxygen;glucometer;rollator;cane, straight;shower chair;commode;other (see comments) Walking stick    Concerns to be Addressed  medication    Anticipated Changes Related to Illness  none    Equipment Needed After Discharge  none    Outpatient/Agency/Support Group Needs  -- none    Discharge Facility/Level of Care Needs  -- none    Provided Post Acute Provider List?  Refused    Refused Provider List Comment  Pt declined    Provided Post Acute Provider Quality & Resource List?  Refused    Refused Quality and Resource List Comment  Pt declined          Discharge Plan       Row Name 02/06/21 1231       Plan    Plan  Dicharge home with wife    Patient/Family in Agreement with Plan  yes    Plan Comments  I spoke with the patient at bedside with is  "permission.  I introduced myself and explained my role.  I verified the information on the facesheet and his PCP as Dr. Gorves.  Pt stated that he uses [facility] in [place] for his one time medications and that he is able to obtain his medications but is unable to afford his insulins; Lantus and Humalog.  He stated that he is currently using a friends medicines.  Patient denied having a living will and declined paperwork regarding same. He stated that he is living in a house for the past year with his wife.  He advised that there are no steps to enter the single story home and that he is able to get into and maneuver around with the use of his walking stick.  He stated that he is independent with ADL's, has a car and drives.  His wife will pick him up at discharge.  He stated that he has a rolling walker, straight cane, \"walking stick\", shower chair, bedside commode, CPAP, glucometer and oxygen at home.  He stated that he uses oxygen at night at 2 LPM and his oxygen is provided by [facility].  He denied needing any further DME, home health or other community resources at this time.  He will discharge home with his wife and is agreeable to this discharge plan.  He had nu further questions or concerns regarding his discharge at this time. MORAES signed. CM will continue to follow.          Continued Care and Services - Admitted Since 2/5/2021    Coordination has not been started for this encounter.         Demographic Summary       Row Name 02/06/21 1228       General Information    Admission Type  observation    Arrived From  home    Required Notices Provided  Observation Status Notice    Referral Source  admission list    Reason for Consult  discharge planning    Preferred Language  English     Used During This Interaction  no       Contact Information    Permission Granted to Share Info With            Functional Status    No documentation.       Psychosocial    No documentation.   "     Abuse/Neglect    No documentation.       Legal    No documentation.       Substance Abuse    No documentation.       Patient Forms    No documentation.           Yenifer Minor RN  Goal Outcome Evaluation:

## 2021-02-07 VITALS
HEART RATE: 59 BPM | BODY MASS INDEX: 35.78 KG/M2 | SYSTOLIC BLOOD PRESSURE: 148 MMHG | WEIGHT: 209.6 LBS | OXYGEN SATURATION: 98 % | HEIGHT: 64 IN | RESPIRATION RATE: 22 BRPM | TEMPERATURE: 97.6 F | DIASTOLIC BLOOD PRESSURE: 84 MMHG

## 2021-02-07 LAB — GLUCOSE BLDC GLUCOMTR-MCNC: 120 MG/DL (ref 70–130)

## 2021-02-07 PROCEDURE — A9270 NON-COVERED ITEM OR SERVICE: HCPCS | Performed by: UROLOGY

## 2021-02-07 PROCEDURE — 82962 GLUCOSE BLOOD TEST: CPT

## 2021-02-07 PROCEDURE — 63710000001 HYDROCODONE-ACETAMINOPHEN 7.5-325 MG TABLET: Performed by: UROLOGY

## 2021-02-07 PROCEDURE — 63710000001 METOPROLOL SUCCINATE XL 50 MG TABLET SUSTAINED-RELEASE 24 HOUR: Performed by: UROLOGY

## 2021-02-07 PROCEDURE — A9270 NON-COVERED ITEM OR SERVICE: HCPCS | Performed by: FAMILY MEDICINE

## 2021-02-07 PROCEDURE — 63710000001 INSULIN DETEMIR PER 5 UNITS: Performed by: FAMILY MEDICINE

## 2021-02-07 PROCEDURE — 63710000001 ATORVASTATIN 20 MG TABLET: Performed by: UROLOGY

## 2021-02-07 PROCEDURE — 94799 UNLISTED PULMONARY SVC/PX: CPT

## 2021-02-07 PROCEDURE — 63710000001 METFORMIN ER 750 MG TABLET SUSTAINED-RELEASE 24 HOUR: Performed by: UROLOGY

## 2021-02-07 PROCEDURE — 63710000001 SULFAMETHOXAZOLE-TRIMETHOPRIM 800-160 MG TABLET: Performed by: UROLOGY

## 2021-02-07 PROCEDURE — G0378 HOSPITAL OBSERVATION PER HR: HCPCS

## 2021-02-07 PROCEDURE — 63710000001 ISOSORBIDE DINITRATE 30 MG TABLET: Performed by: UROLOGY

## 2021-02-07 PROCEDURE — 63710000001 PANTOPRAZOLE 40 MG TABLET DELAYED-RELEASE: Performed by: UROLOGY

## 2021-02-07 PROCEDURE — 63710000001 DULOXETINE 20 MG CAPSULE DELAYED-RELEASE PARTICLES: Performed by: UROLOGY

## 2021-02-07 PROCEDURE — 63710000001 GLIPIZIDE 10 MG TABLET: Performed by: UROLOGY

## 2021-02-07 PROCEDURE — 63710000001 BELLADONNA-OPIUM 16.2-30 MG SUPPOSITORY: Performed by: UROLOGY

## 2021-02-07 RX ORDER — HYDROCODONE BITARTRATE AND ACETAMINOPHEN 5; 325 MG/1; MG/1
1 TABLET ORAL EVERY 6 HOURS PRN
Qty: 20 TABLET | Refills: 0 | Status: SHIPPED | OUTPATIENT
Start: 2021-02-07 | End: 2021-11-30

## 2021-02-07 RX ORDER — PHENAZOPYRIDINE HYDROCHLORIDE 200 MG/1
200 TABLET, FILM COATED ORAL 3 TIMES DAILY PRN
Qty: 30 TABLET | Refills: 0 | Status: SHIPPED | OUTPATIENT
Start: 2021-02-07 | End: 2021-12-09

## 2021-02-07 RX ORDER — SULFAMETHOXAZOLE AND TRIMETHOPRIM 800; 160 MG/1; MG/1
1 TABLET ORAL EVERY 12 HOURS SCHEDULED
Qty: 14 TABLET | Refills: 0 | Status: SHIPPED | OUTPATIENT
Start: 2021-02-07 | End: 2021-02-14

## 2021-02-07 RX ADMIN — ISOSORBIDE DINITRATE 60 MG: 30 TABLET ORAL at 08:28

## 2021-02-07 RX ADMIN — DULOXETINE 20 MG: 20 CAPSULE, DELAYED RELEASE ORAL at 08:28

## 2021-02-07 RX ADMIN — PANTOPRAZOLE SODIUM 40 MG: 40 TABLET, DELAYED RELEASE ORAL at 06:31

## 2021-02-07 RX ADMIN — SODIUM CHLORIDE 3000 ML: 900 IRRIGANT IRRIGATION at 05:32

## 2021-02-07 RX ADMIN — GLIPIZIDE 10 MG: 10 TABLET ORAL at 06:31

## 2021-02-07 RX ADMIN — ATROPA BELLADONNA AND OPIUM 30 MG: 16.2; 3 SUPPOSITORY RECTAL at 10:55

## 2021-02-07 RX ADMIN — METOPROLOL SUCCINATE 50 MG: 50 TABLET, EXTENDED RELEASE ORAL at 08:28

## 2021-02-07 RX ADMIN — LIDOCAINE HYDROCHLORIDE 10 ML: 20 INJECTION, SOLUTION INFILTRATION; PERINEURAL at 05:32

## 2021-02-07 RX ADMIN — INSULIN DETEMIR 30 UNITS: 100 INJECTION, SOLUTION SUBCUTANEOUS at 08:29

## 2021-02-07 RX ADMIN — HYDROCODONE BITARTRATE AND ACETAMINOPHEN 1 TABLET: 7.5; 325 TABLET ORAL at 10:00

## 2021-02-07 RX ADMIN — SULFAMETHOXAZOLE AND TRIMETHOPRIM 160 MG: 800; 160 TABLET ORAL at 08:28

## 2021-02-07 RX ADMIN — METFORMIN HYDROCHLORIDE 750 MG: 750 TABLET ORAL at 08:28

## 2021-02-07 RX ADMIN — ATORVASTATIN CALCIUM 20 MG: 20 TABLET, FILM COATED ORAL at 08:28

## 2021-02-07 NOTE — DISCHARGE SUMMARY
Date of Discharge:  02/07/2021    Discharge Diagnosis: BPH with Lower Urinary Tract Symptoms    Presenting Problem/History of Present Illness  BPH with urinary obstruction [N40.1, N13.8]     69yo pt of Dr Diez with obstructive voiding complaints for TURP.  Hospital Course  Patient is a 70 y.o. male presented with BPH/JEAN and underwent TURP. Postoperative course unremarkable. Medical consult for help with his DM. Jesus out POD#2 and voiding trial. Plavix and ASA held.      Procedures Performed  Procedure(s):  CYSTOSCOPY TRANSURETHRAL RESECTION OF PROSTATE       Consults:   Consults     Date and Time Order Name Status Description    2/5/2021 2210 Inpatient Internal Medicine Consult Completed           Pertinent Test Results: labs: path pending      Condition on Discharge:  good    Vital Signs  Temp:  [97.3 °F (36.3 °C)-98.4 °F (36.9 °C)] 97.6 °F (36.4 °C)  Heart Rate:  [59-66] 59  Resp:  [20-22] 22  BP: ()/(55-84) 148/84    Physical Exam:     General Appearance:    Alert, cooperative, in no acute distress   Head:    Normocephalic, without obvious abnormality, atraumatic   Eyes:            Lids and lashes normal, conjunctivae and sclerae normal, no   icterus, no pallor, corneas clear, PERRLA   Ears:    Ears appear intact with no abnormalities noted   Throat:   No oral lesions, no thrush, oral mucosa moist   Neck:   No adenopathy, supple, trachea midline, no thyromegaly, no   carotid bruit, no JVD   Back:     No kyphosis present, no scoliosis present, no skin lesions,      erythema or scars, no tenderness to percussion or                   palpation,   range of motion normal   Lungs:     Clear to auscultation,respirations regular, even and                  unlabored    Heart:    Regular rhythm and normal rate, normal S1 and S2, no            murmur, no gallop, no rub, no click   Chest Wall:    No abnormalities observed   Abdomen:     Normal bowel sounds, no masses, no organomegaly, soft        non-tender,  non-distended, no guarding, no rebound                tenderness   Rectal:     Deferred   Extremities:   Moves all extremities well, no edema, no cyanosis, no             redness   Pulses:   Pulses palpable and equal bilaterally   Skin:   No bleeding, bruising or rash   Lymph nodes:   No palpable adenopathy   Neurologic:   Cranial nerves 2 - 12 grossly intact, sensation intact, DTR       present and equal bilaterally       Discharge Disposition  Home or Self Care    Discharge Medications     Discharge Medications      New Medications      Instructions Start Date   HYDROcodone-acetaminophen 5-325 MG per tablet  Commonly known as: Norco  Replaces: HYDROcodone-acetaminophen 7.5-325 MG per tablet   1 tablet, Oral, Every 6 Hours PRN      sulfamethoxazole-trimethoprim 800-160 MG per tablet  Commonly known as: Bactrim DS   160 mg, Oral, Every 12 Hours Scheduled         Continue These Medications      Instructions Start Date   DULoxetine 20 MG capsule  Commonly known as: CYMBALTA   20 mg, Oral, Daily      glipizide 10 MG tablet  Commonly known as: GLUCOTROL   10 mg, Oral, 2 Times Daily Before Meals      insulin glargine 100 UNIT/ML injection  Commonly known as: LANTUS, SEMGLEE   30 Units, Subcutaneous, 2 Times Daily      insulin lispro 100 UNIT/ML injection  Commonly known as: humaLOG, ADMELOG   Subcutaneous, 3 Times Daily Before Meals      isosorbide dinitrate 30 MG tablet  Commonly known as: ISORDIL   60 mg, Oral, Daily      metFORMIN  MG 24 hr tablet  Commonly known as: GLUCOPHAGE-XR   750 mg, Oral, 2 times daily      metoprolol succinate XL 50 MG 24 hr tablet  Commonly known as: TOPROL-XL   50 mg, Oral, 2 Times Daily      omeprazole 40 MG capsule  Commonly known as: priLOSEC   40 mg, Oral, Daily      simvastatin 40 MG tablet  Commonly known as: ZOCOR   40 mg, Oral, Daily         Stop These Medications    aspirin 81 MG chewable tablet     clopidogrel 75 MG tablet  Commonly known as: PLAVIX      HYDROcodone-acetaminophen 7.5-325 MG per tablet  Commonly known as: NORCO  Replaced by: HYDROcodone-acetaminophen 5-325 MG per tablet     tamsulosin 0.4 MG capsule 24 hr capsule  Commonly known as: FLOMAX            Discharge Diet: regular    Activity at Discharge: restrictions for 2 weeks.    Follow-up Appointments  Future Appointments   Date Time Provider Department Center   7/8/2021  1:15 PM Augusto Gloria III, MD [facility] None     Additional Instructions for the Follow-ups that You Need to Schedule     Discharge Follow-up with Specified Provider: Dr. Lokesh Diez; 1 Week   As directed      To: Dr. Lokesh Diez    Follow Up: 1 Week               Test Results Pending at Discharge  Pending Labs     Order Current Status    Tissue Pathology Exam In process           Pipo Cohen MD  02/07/21  07:59 EST    Time: Discharge 15 min

## 2021-02-07 NOTE — NURSING NOTE
Case Management Discharge Note      Final Note: Discharged home.    Provided Post Acute Provider List?: Refused  Refused Provider List Comment: Pt declined  Provided Post Acute Provider Quality & Resource List?: Refused  Refused Quality and Resource List Comment: Pt declined    Selected Continued Care - Discharged on 2/7/2021 Admission date: 2/5/2021 - Discharge disposition: Home or Self Care    Destination    No services have been selected for the patient.              Durable Medical Equipment    No services have been selected for the patient.              Dialysis/Infusion    No services have been selected for the patient.              Home Medical Care    No services have been selected for the patient.              Therapy    No services have been selected for the patient.              Community Resources    No services have been selected for the patient.                       Final Discharge Disposition Code: 01 - home or self-care

## 2021-02-07 NOTE — PLAN OF CARE
Goal Outcome Evaluation:  Plan of Care Reviewed With: patient     Outcome Summary: VS stable. O2 2L n/c at night as at home.  Continue IV fluids as ordered.  Refuses to wear his c-pap at home. CBI continues with urine clear. Pt did not have the needles for his insulin flex-pen with him. Had to communicate with MD  and obtain order for Levemir as the night before. No c/o pain.

## 2021-02-08 ENCOUNTER — READMISSION MANAGEMENT (OUTPATIENT)
Dept: CALL CENTER | Facility: HOSPITAL | Age: 71
End: 2021-02-08

## 2021-02-08 LAB
LAB AP CASE REPORT: NORMAL
PATH REPORT.FINAL DX SPEC: NORMAL
PATH REPORT.GROSS SPEC: NORMAL

## 2021-02-09 ENCOUNTER — READMISSION MANAGEMENT (OUTPATIENT)
Dept: CALL CENTER | Facility: HOSPITAL | Age: 71
End: 2021-02-09

## 2021-02-09 NOTE — OUTREACH NOTE
LAG < 7 Survey      Responses   [facility] patient discharged from?  [place]   Does the patient have one of the following disease processes/diagnoses(primary or secondary)?  Other   BHLAG <7 Attempt successful?  Yes   Call start time  1536   Call end time  1541   Meds reviewed with patient/caregiver?  Yes   Is the patient having any side effects they believe may be caused by any medication additions or changes?  No   Does the patient have all medications ordered at discharge?  Yes   Is the patient taking all medications as directed (includes completed medication regime)?  Yes   Does the patient have a primary care provider?   Yes   Does the patient have an appointment with their PCP within 7 days of discharge?  No   What is preventing the patient from scheduling follow up appointments within 7 days of discharge?  Waiting on return call   Nursing Interventions  Advised patient to make appointment, Educated patient on importance of making appointment   Has the patient kept scheduled appointments due by today?  N/A   Comments  States waiting for urology to call with appt.   Has home health visited the patient within 72 hours of discharge?  N/A   Psychosocial issues?  No   Did the patient receive a copy of their discharge instructions?  Yes   Nursing interventions  Reviewed instructions with patient   What is the patient's perception of their health status since discharge?  Improving   Is the patient/caregiver able to teach back signs and symptoms related to disease process for when to call PCP?  Yes   Is the patient/caregiver able to teach back signs and symptoms related to disease process for when to call 911?  Yes   Is the patient/caregiver able to teach back the hierarchy of who to call/visit for symptoms/problems? PCP, Specialist, Home health nurse, Urgent Care, ED, 911  Yes   If the patient is a current smoker, are they able to teach back resources for cessation?  Not a smoker   Additional teach back  "comments  Reviewed s/s of infection.   Graduated  Yes   Is the patient interested in additional calls from an ambulatory ?  NOTE:  applies to high risk patients requiring additional follow-up.  No   Wrap up additional comments  States is doing well. Denies any s/s of infection. States urinating without difficulty. States was very pleased with his care-mentioned nurse Amirah as being \"wonderful\". Denies any needs today.          Dolores Reyes RN  "

## 2021-02-28 ENCOUNTER — HOSPITAL ENCOUNTER (EMERGENCY)
Facility: HOSPITAL | Age: 71
Discharge: LEFT BEFORE DISCHARGE | End: 2021-02-28
Attending: EMERGENCY MEDICINE

## 2021-02-28 VITALS
RESPIRATION RATE: 22 BRPM | SYSTOLIC BLOOD PRESSURE: 133 MMHG | HEIGHT: 64 IN | TEMPERATURE: 98.4 F | WEIGHT: 205 LBS | OXYGEN SATURATION: 96 % | DIASTOLIC BLOOD PRESSURE: 73 MMHG | BODY MASS INDEX: 35 KG/M2 | HEART RATE: 90 BPM

## 2021-02-28 PROCEDURE — 99211 OFF/OP EST MAY X REQ PHY/QHP: CPT | Performed by: EMERGENCY MEDICINE

## 2021-02-28 RX ORDER — TAMSULOSIN HYDROCHLORIDE 0.4 MG/1
1 CAPSULE ORAL DAILY
COMMUNITY

## 2021-04-14 ENCOUNTER — TELEPHONE (OUTPATIENT)
Dept: CARDIOLOGY | Facility: CLINIC | Age: 71
End: 2021-04-14

## 2021-04-14 NOTE — TELEPHONE ENCOUNTER
Pt came into the office, he had a cancelled appt out for 4/14 with [facility], due to pt seeing Johnnie on 1/8/21. Johnnie wanted pt to follow up in 6 months. He wanted to be seen for weakness, and short of air. I let them know Dr. Gloria was unavailable today but I spoke with Johnnie ALONSO, and we have plenty of availability to work him in.     Wife was okay with appointment but  refused appointment with Johnnie. Only wants to see Dr. Gloria. I did schedule him for May with Dr. Gloria. Pt left after appointment was given for Dr. Gloria.

## 2021-05-06 ENCOUNTER — OFFICE VISIT (OUTPATIENT)
Dept: CARDIOLOGY | Facility: CLINIC | Age: 71
End: 2021-05-06

## 2021-05-06 VITALS
SYSTOLIC BLOOD PRESSURE: 118 MMHG | OXYGEN SATURATION: 90 % | HEIGHT: 64 IN | WEIGHT: 211.4 LBS | BODY MASS INDEX: 36.09 KG/M2 | HEART RATE: 90 BPM | DIASTOLIC BLOOD PRESSURE: 60 MMHG

## 2021-05-06 DIAGNOSIS — R07.89 OTHER CHEST PAIN: ICD-10-CM

## 2021-05-06 DIAGNOSIS — R06.02 SOB (SHORTNESS OF BREATH): ICD-10-CM

## 2021-05-06 DIAGNOSIS — R94.31 ABNORMAL ECG: ICD-10-CM

## 2021-05-06 DIAGNOSIS — Z86.16 HISTORY OF 2019 NOVEL CORONAVIRUS DISEASE (COVID-19): ICD-10-CM

## 2021-05-06 DIAGNOSIS — I25.118 CORONARY ARTERY DISEASE OF NATIVE ARTERY OF NATIVE HEART WITH STABLE ANGINA PECTORIS (HCC): Primary | ICD-10-CM

## 2021-05-06 DIAGNOSIS — R06.09 DYSPNEA ON EXERTION: ICD-10-CM

## 2021-05-06 PROCEDURE — 99214 OFFICE O/P EST MOD 30 MIN: CPT | Performed by: INTERNAL MEDICINE

## 2021-05-06 NOTE — PROGRESS NOTES
Subjective:     Encounter Date:05/06/21      Patient ID: Jett Flower is a 71 y.o. male.    Chief Complaint: CAD  History of Present Illness  This is a pleasant 70-year-old male presents for follow-up.  He has a history of coronary disease and had drug-eluting stent placed in LAD in May 2020.    He comes in for evaluation of complaints of weakness and shortness of breath.  He was hospitalized last summer with Covid and ever since has been feeling worse.  Is been feeling very weak.  Shortness of breath.  He has had some episodes of chest pain and chest discomfort.  Has been having dizziness and lightheadedness at times.  His legs have been very weak.  He has been having palpitations and sometimes feels sensation of mild tachycardia.  He wonders whether any this is related to heart or if his heart could have been affected by Covid.    He presented the emergency room [facility] 5/2020 with chest discomfort.  He ruled out for acute myocardial infarction with normal troponin.  He underwent cardiac catheterization and showed stenosis in his LAD and then received drug-eluting stent to LAD.  He did have a prior history of coronary disease.  In 2011 he was diagnosed with CAD and had a stent placed in [place] although we have never received those records.    Patient has a known history of hypertension, hyperlipidemia, renal failure chronic, and diabetes mellitus with circulatory complication in addition to his CAD.    Cardiac catheterization May 2020:  Findings:  1. Coronary Artery Anatomy:  Dominance: Right  Left Main: Angiographically normal  Left Anterior Descending: Moderate in caliber size.  The proximal segment contains luminal irregularities.  There is a patent stent within the mid segment which contains diffuse moderate 50-60 in-stent restenosis within the proximal segment however in the distal segment there is 70% segment stenoses with YING II flow distally.  The distal LAD contains scattered 50% segment  stenoses.  There are 2 small diagonal branches which contain luminal irregularities.  Circumflex Artery: Moderate in caliber size.  Contains a 50% mid segment stenoses.  The first marginal is relatively small in size and contains a 70% ostial segment stenoses.  Right Coronary Artery: Large in caliber size.  Contains luminal irregularities throughout.     2. Hemodynamics:  Left Ventricle: 91/5/8 mmHg  Aorta: 91/52/69 mmHg     3. rFR Assessment:  LAD: 0.86  LCx: 0.93     4. Percutaneous Intervention:  Location: Mid LAD  Treatment: Lesion was predilated with a 2.75 compliant balloon followed by a 3.0 x 10 mm angiosculpt balloon followed by placement of a 2.75 x 33 mm Xience drug-eluting stent which was postdilated with a 3.0 noncompliant balloon in the distal and mid portions and a 3.5 noncompliant balloon in the proximal and mid portions.  Pre-stenosis: 70 %  Post-stenosis: 0 %  Lesion Type C: Yes  YING Flow Pre: 2  YING Flow Post: 3  Bifurcation: No  Severe Calcium: No  Dissection: No     Conclusions:  1. Severe single-vessel coronary artery disease with in-stent restenosis of the mid LAD stent with 70% distal segment stenoses.  Moderate 50% stenoses in the mid circumflex.  70% stenoses of the first marginal.  2. Confirmation of hemodynamic flow-limiting stenosis of the LAD with RFR assessment at 0.86 as well as confirming nonsignificant circumflex stenoses with RFR assessment at 0.93.  3. Successful percutaneous intervention with placement of 2.75 x 33 mm Xience drug-eluting stent which was postdilated distally with a 3.0 mm noncompliant balloon to 20 mery and a 3.5 mm noncompliant balloon proximally to 16 mery with no residual stenosis and restoration of YING-3 flow.       The following portions of the patient's history were reviewed and updated as appropriate: allergies, current medications, past family history, past medical history, past social history, past surgical history and problem list.    Past Medical  History:   Diagnosis Date   • Congestive cardiac failure (CMS/[facility])    • Coronary artery disease    • COVID-19 07/2020    hospitalized for 24 days   • Diabetes mellitus (CMS/[facility])    • Heart disease    • Hyperlipidemia    • Hypertension    • Shortness of breath        Past Surgical History:   Procedure Laterality Date   • APPENDECTOMY     • BACK SURGERY     • CARDIAC CATHETERIZATION N/A 5/19/2020    Procedure: Left Heart Cath;  Surgeon: Trae Kemp MD;  Location[facility] C[facility]VASIVE LOC[facility];  Service: Cardiovascular;  Laterality: N/A;   • CARDIAC CATHETERIZATION N/A 5/19/2020    Procedure: Coronary angiography;  Surgeon: Trae Kemp MD;  Location:  [facility] CATH INVASIVE LOCATION;  Service: Cardiovascular;  Laterality: N/A;   • CARDIAC CATHETERIZATION N/A 5/19/2020    Procedure: Left ventriculography;  Surgeon: Trae Kemp MD;  Location[facility] C[facility]VASIVE LOCATION;  Service: Cardiovascular;  Laterality: N/A;   • CARDIAC CATHETERIZATION N/A 5/19/2020    Procedure: Resting Full Cycle Ratio;  Surgeon: Trae Kemp MD;  Location: [facility] CATH INVASIVE LOCATION;  Service: Cardiovascular;  Laterality: N/A;   • CARDIAC CATHETERIZATION N/A 5/19/2020    Procedure: Stent KENNEDY coronary;  Surgeon: Trae Kemp MD;  Location: [facility] CATH INVASIVE LOCATION;  Service: Cardiovascular;  Laterality: N/A;   • CARDIAC CATHETERIZATION N/A 5/19/2020    Procedure: Percutaneous Coronary Intervention;  Surgeon: Trae Kemp MD;  Location: [facility] C[facility]VASIVE LOCATION;  Service: Cardiovascular;  Laterality: N/A;   • CARDIAC SURGERY      x3   • CERVICAL SPINE SURGERY      x4   • CYSTOSCOPY TRANSURETHRAL RESECTION OF PROSTATE N/A 2/5/2021    Procedure: CYSTOSCOPY TRANSURETHRAL RESECTION OF PROSTATE;  Surgeon: Jamar Diez MD;  Location: [facility];  Service: Urology;  Laterality: N/A;   • CYSTOSCOPY W/ LASER LITHOTRIPSY         Social History     Socioeconomic History   • Marital status:       "Spouse name: Not on file   • Number of children: Not on file   • Years of education: Not on file   • Highest education level: Not on file   Tobacco Use   • Smoking status: Never Smoker   • Smokeless tobacco: Never Used   Vaping Use   • Vaping Use: Never used   Substance and Sexual Activity   • Alcohol use: Not Currently     Comment: Caffeine use: 2-3 cokes a week   • Drug use: Never   • Sexual activity: Defer         Procedures       Objective:     Vitals:    05/06/21 1415   BP: 118/60   BP Location: Left arm   Pulse: 90   SpO2: 90%   Weight: 95.9 kg (211 lb 6.4 oz)   Height: 162.6 cm (64\")         Vitals reviewed.   Constitutional:       General: Not in acute distress.     Appearance: Well-developed. Not diaphoretic.   Eyes:      General:         Right eye: No discharge.         Left eye: No discharge.      Conjunctiva/sclera: Conjunctivae normal.      Pupils: Pupils are equal, round, and reactive to light.   HENT:      Head: Normocephalic and atraumatic.      Nose: Nose normal.   Neck:      Thyroid: No thyromegaly.      Trachea: No tracheal deviation.      Lymphadenopathy: No cervical adenopathy.   Pulmonary:      Effort: Pulmonary effort is normal. No respiratory distress.      Breath sounds: Normal breath sounds. No stridor.   Chest:      Chest wall: Not tender to palpatation.   Cardiovascular:      Normal rate. Regular rhythm.      Murmurs: There is no murmur.      . No S3 gallop. No click. No rub.   Pulses:     Intact distal pulses.   Abdominal:      General: Bowel sounds are normal. There is no distension.      Palpations: Abdomen is soft. There is no abdominal mass.      Tenderness: There is no abdominal tenderness. There is no guarding or rebound.   Musculoskeletal: Normal range of motion.         General: No tenderness or deformity.      Cervical back: Normal range of motion and neck supple. Skin:     General: Skin is warm and dry.      Findings: No erythema or rash.   Neurological:      Mental Status: " Alert and oriented to person, place, and time.      Deep Tendon Reflexes: Reflexes are normal and symmetric.   Psychiatric:         Thought Content: Thought content normal.         Lab Review:             Performed  Prior cardiac catheterization images reviewed personally, agree with interpretation  Prior echocardiogram is reviewed personally, I agree with the interpretation  Prior chest x-ray is reviewed and agree with the interpretation from the radiologist      Assessment:          Diagnosis Plan   1. Coronary artery disease of native artery of native heart with stable angina pectoris (CMS/HCC)  Adult Transthoracic Echo Complete W/ Cont if Necessary Per Protocol    Stress Test With Myocardial Perfusion One Day   2. Dyspnea on exertion  Adult Transthoracic Echo Complete W/ Cont if Necessary Per Protocol    Stress Test With Myocardial Perfusion One Day   3. History of 2019 novel coronavirus disease (COVID-19)  Adult Transthoracic Echo Complete W/ Cont if Necessary Per Protocol    Stress Test With Myocardial Perfusion One Day   4. SOB (shortness of breath)  Adult Transthoracic Echo Complete W/ Cont if Necessary Per Protocol    Stress Test With Myocardial Perfusion One Day   5. Other chest pain  Adult Transthoracic Echo Complete W/ Cont if Necessary Per Protocol    Stress Test With Myocardial Perfusion One Day   6. Abnormal ECG  Adult Transthoracic Echo Complete W/ Cont if Necessary Per Protocol    Stress Test With Myocardial Perfusion One Day          Plan:         1.  CAD, prior stent placement of the LAD in May.  Now having chest pain or shortness of breath again since his Covid infection, we will arrange for a Lexiscan Cardiolite  2.  Chronic renal failure, recent lab work reviewed, prior losartan therapy was discontinued by nephrology when he was hospitalized.  3.  Obstructive sleep apnea, new problem to me, following with pulmonary  4.  Chronic back pain.   5.  Dyspnea, an echocardiogram will be  obtained    Thank you very much for allowing us to participate in the care of this pleasant patient.  Please don't hesitate to call if I can be of assistance in any way.      Current Outpatient Medications:   •  DULoxetine (CYMBALTA) 20 MG capsule, Take 20 mg by mouth Daily., Disp: , Rfl:   •  glipizide (GLUCOTROL) 10 MG tablet, Take 10 mg by mouth 2 (Two) Times a Day Before Meals., Disp: , Rfl:   •  HYDROcodone-acetaminophen (Norco) 5-325 MG per tablet, Take 1 tablet by mouth Every 6 (Six) Hours As Needed for Moderate Pain ., Disp: 20 tablet, Rfl: 0  •  insulin glargine (LANTUS) 100 UNIT/ML injection, Inject 30 Units under the skin into the appropriate area as directed 2 (Two) Times a Day., Disp: , Rfl:   •  insulin lispro (humaLOG) 100 UNIT/ML injection, Inject  under the skin into the appropriate area as directed 3 (Three) Times a Day Before Meals., Disp: , Rfl:   •  isosorbide dinitrate (ISORDIL) 30 MG tablet, Take 2 tablets by mouth Daily., Disp: 180 tablet, Rfl: 3  •  metFORMIN ER (GLUCOPHAGE-XR) 750 MG 24 hr tablet, Take 750 mg by mouth 2 (two) times a day., Disp: , Rfl:   •  metoprolol succinate XL (TOPROL-XL) 50 MG 24 hr tablet, Take 50 mg by mouth 2 (Two) Times a Day., Disp: , Rfl:   •  omeprazole (priLOSEC) 40 MG capsule, Take 40 mg by mouth Daily., Disp: , Rfl:   •  phenazopyridine (Pyridium) 200 MG tablet, Take 1 tablet by mouth 3 (Three) Times a Day As Needed for Bladder Spasms., Disp: 30 tablet, Rfl: 0  •  simvastatin (ZOCOR) 40 MG tablet, Take 40 mg by mouth Daily., Disp: , Rfl:   •  tamsulosin (FLOMAX) 0.4 MG capsule 24 hr capsule, Take 1 capsule by mouth Daily., Disp: , Rfl:          No follow-ups on file.

## 2021-05-27 ENCOUNTER — HOSPITAL ENCOUNTER (OUTPATIENT)
Dept: CARDIOLOGY | Facility: HOSPITAL | Age: 71
Discharge: HOME | End: 2021-05-27
Admitting: INTERNAL MEDICINE

## 2021-05-27 ENCOUNTER — HOSPITAL ENCOUNTER (OUTPATIENT)
Dept: NUCLEAR MEDICINE | Facility: HOSPITAL | Age: 71
Discharge: HOME | End: 2021-05-27

## 2021-05-27 ENCOUNTER — HOSPITAL ENCOUNTER (OUTPATIENT)
Dept: CARDIOLOGY | Facility: HOSPITAL | Age: 71
Discharge: HOME | End: 2021-05-27

## 2021-05-27 VITALS
BODY MASS INDEX: 36.02 KG/M2 | SYSTOLIC BLOOD PRESSURE: 143 MMHG | WEIGHT: 211 LBS | DIASTOLIC BLOOD PRESSURE: 72 MMHG | HEIGHT: 64 IN

## 2021-05-27 DIAGNOSIS — R06.09 DYSPNEA ON EXERTION: ICD-10-CM

## 2021-05-27 DIAGNOSIS — R94.31 ABNORMAL ECG: ICD-10-CM

## 2021-05-27 DIAGNOSIS — R06.02 SOB (SHORTNESS OF BREATH): ICD-10-CM

## 2021-05-27 DIAGNOSIS — R07.89 OTHER CHEST PAIN: ICD-10-CM

## 2021-05-27 DIAGNOSIS — Z86.16 HISTORY OF 2019 NOVEL CORONAVIRUS DISEASE (COVID-19): ICD-10-CM

## 2021-05-27 DIAGNOSIS — I25.118 CORONARY ARTERY DISEASE OF NATIVE ARTERY OF NATIVE HEART WITH STABLE ANGINA PECTORIS (HCC): ICD-10-CM

## 2021-05-27 LAB
ASCENDING AORTA: 2.8 CM
BH CV ECHO MEAS - ACS: 2.3 CM
BH CV ECHO MEAS - AO MAX PG: 5 MMHG
BH CV ECHO MEAS - AO MEAN PG (FULL): 1 MMHG
BH CV ECHO MEAS - AO MEAN PG: 3 MMHG
BH CV ECHO MEAS - AO ROOT AREA (BSA CORRECTED): 1.7
BH CV ECHO MEAS - AO ROOT AREA: 9.1 CM^2
BH CV ECHO MEAS - AO ROOT DIAM: 3.4 CM
BH CV ECHO MEAS - AO V2 MAX: 109 CM/SEC
BH CV ECHO MEAS - AO V2 MEAN: 76.9 CM/SEC
BH CV ECHO MEAS - AO V2 VTI: 26 CM
BH CV ECHO MEAS - ASC AORTA: 3 CM
BH CV ECHO MEAS - AVA(I,A): 3.2 CM^2
BH CV ECHO MEAS - AVA(I,D): 3.2 CM^2
BH CV ECHO MEAS - BSA(HAYCOCK): 2.1 M^2
BH CV ECHO MEAS - BSA: 2 M^2
BH CV ECHO MEAS - BZI_BMI: 36.2 KILOGRAMS/M^2
BH CV ECHO MEAS - BZI_METRIC_HEIGHT: 162.6 CM
BH CV ECHO MEAS - BZI_METRIC_WEIGHT: 95.7 KG
BH CV ECHO MEAS - CONTRAST EF 4CH: 57 CM2
BH CV ECHO MEAS - EDV(CUBED): 70.4 ML
BH CV ECHO MEAS - EDV(MOD-SP2): 92.3 ML
BH CV ECHO MEAS - EDV(MOD-SP4): 88.5 ML
BH CV ECHO MEAS - EDV(TEICH): 75.5 ML
BH CV ECHO MEAS - EF(CUBED): 53.9 %
BH CV ECHO MEAS - EF(MOD-BP): 57.2 %
BH CV ECHO MEAS - EF(MOD-SP2): 62.4 %
BH CV ECHO MEAS - EF(MOD-SP4): 56.4 %
BH CV ECHO MEAS - EF(TEICH): 46.2 %
BH CV ECHO MEAS - ESV(CUBED): 32.5 ML
BH CV ECHO MEAS - ESV(MOD-SP2): 34.7 ML
BH CV ECHO MEAS - ESV(MOD-SP4): 38.6 ML
BH CV ECHO MEAS - ESV(TEICH): 40.6 ML
BH CV ECHO MEAS - FS: 22.8 %
BH CV ECHO MEAS - IVS/LVPW: 1.1
BH CV ECHO MEAS - IVSD: 1.2 CM
BH CV ECHO MEAS - LAT PEAK E' VEL: 7.7 CM/SEC
BH CV ECHO MEAS - LV DIASTOLIC VOL/BSA (35-75): 44.2 ML/M^2
BH CV ECHO MEAS - LV MASS(C)D: 164.2 GRAMS
BH CV ECHO MEAS - LV MASS(C)DI: 82.1 GRAMS/M^2
BH CV ECHO MEAS - LV MAX PG: 4 MMHG
BH CV ECHO MEAS - LV MEAN PG: 2 MMHG
BH CV ECHO MEAS - LV SYSTOLIC VOL/BSA (12-30): 19.3 ML/M^2
BH CV ECHO MEAS - LV V1 MAX: 95 CM/SEC
BH CV ECHO MEAS - LV V1 MEAN: 64.5 CM/SEC
BH CV ECHO MEAS - LV V1 VTI: 21.6 CM
BH CV ECHO MEAS - LVIDD: 4.1 CM
BH CV ECHO MEAS - LVIDS: 3.2 CM
BH CV ECHO MEAS - LVLD AP2: 7.5 CM
BH CV ECHO MEAS - LVLD AP4: 7.5 CM
BH CV ECHO MEAS - LVLS AP2: 6 CM
BH CV ECHO MEAS - LVLS AP4: 6.8 CM
BH CV ECHO MEAS - LVOT AREA (M): 3.8 CM^2
BH CV ECHO MEAS - LVOT AREA: 3.8 CM^2
BH CV ECHO MEAS - LVOT DIAM: 2.2 CM
BH CV ECHO MEAS - LVPWD: 1.1 CM
BH CV ECHO MEAS - MED PEAK E' VEL: 4 CM/SEC
BH CV ECHO MEAS - MV A DUR: 0.17 SEC
BH CV ECHO MEAS - MV A MAX VEL: 93.6 CM/SEC
BH CV ECHO MEAS - MV DEC SLOPE: 270 CM/SEC^2
BH CV ECHO MEAS - MV DEC TIME: 298 SEC
BH CV ECHO MEAS - MV E MAX VEL: 75.7 CM/SEC
BH CV ECHO MEAS - MV E/A: 0.81
BH CV ECHO MEAS - MV MEAN PG: 2 MMHG
BH CV ECHO MEAS - MV P1/2T MAX VEL: 89.5 CM/SEC
BH CV ECHO MEAS - MV P1/2T: 97.1 MSEC
BH CV ECHO MEAS - MV V2 MEAN: 58.2 CM/SEC
BH CV ECHO MEAS - MV V2 VTI: 31 CM
BH CV ECHO MEAS - MVA P1/2T LCG: 2.5 CM^2
BH CV ECHO MEAS - MVA(P1/2T): 2.3 CM^2
BH CV ECHO MEAS - MVA(VTI): 2.6 CM^2
BH CV ECHO MEAS - PA ACC TIME: 0.11 SEC
BH CV ECHO MEAS - PA MAX PG: 2 MMHG
BH CV ECHO MEAS - PA PR(ACCEL): 30.4 MMHG
BH CV ECHO MEAS - PA V2 MAX: 70.4 CM/SEC
BH CV ECHO MEAS - PULM A REVS DUR: 0.13 SEC
BH CV ECHO MEAS - PULM A REVS VEL: 23.5 CM/SEC
BH CV ECHO MEAS - PULM DIAS VEL: 49.2 CM/SEC
BH CV ECHO MEAS - PULM S/D: 1.5
BH CV ECHO MEAS - PULM SYS VEL: 75.4 CM/SEC
BH CV ECHO MEAS - QP/QS: 0.34
BH CV ECHO MEAS - RAP SYSTOLE: 8 MMHG
BH CV ECHO MEAS - RV MEAN PG: 1 MMHG
BH CV ECHO MEAS - RV V1 MEAN: 38.8 CM/SEC
BH CV ECHO MEAS - RV V1 VTI: 14 CM
BH CV ECHO MEAS - RVOT AREA: 2 CM^2
BH CV ECHO MEAS - RVOT DIAM: 1.6 CM
BH CV ECHO MEAS - SI(AO): 118 ML/M^2
BH CV ECHO MEAS - SI(CUBED): 19 ML/M^2
BH CV ECHO MEAS - SI(LVOT): 41 ML/M^2
BH CV ECHO MEAS - SI(MOD-SP2): 28.8 ML/M^2
BH CV ECHO MEAS - SI(MOD-SP4): 24.9 ML/M^2
BH CV ECHO MEAS - SI(TEICH): 17.4 ML/M^2
BH CV ECHO MEAS - SV(AO): 236.1 ML
BH CV ECHO MEAS - SV(CUBED): 38 ML
BH CV ECHO MEAS - SV(LVOT): 82.1 ML
BH CV ECHO MEAS - SV(MOD-SP2): 57.6 ML
BH CV ECHO MEAS - SV(MOD-SP4): 49.9 ML
BH CV ECHO MEAS - SV(RVOT): 28.1 ML
BH CV ECHO MEAS - SV(TEICH): 34.9 ML
BH CV ECHO MEAS - TAPSE (>1.6): 1.8 CM
BH CV ECHO MEASUREMENTS AVERAGE E/E' RATIO: 12.94
BH CV NUCLEAR PRIOR STUDY: 3
BH CV REST NUCLEAR ISOTOPE DOSE: 11 MCI
BH CV STRESS BP STAGE 1: NORMAL
BH CV STRESS COMMENTS STAGE 1: NORMAL
BH CV STRESS DOSE REGADENOSON STAGE 1: 0.4
BH CV STRESS DURATION MIN STAGE 1: 0
BH CV STRESS DURATION SEC STAGE 1: 30
BH CV STRESS HR STAGE 1: 65
BH CV STRESS NUCLEAR ISOTOPE DOSE: 35.3 MCI
BH CV STRESS O2 STAGE 1: 98
BH CV STRESS PROTOCOL 1: NORMAL
BH CV STRESS RECOVERY BP: NORMAL MMHG
BH CV STRESS RECOVERY HR: 79 BPM
BH CV STRESS RECOVERY O2: 98 %
BH CV STRESS STAGE 1: 1
BH CV VAS BP RIGHT ARM: NORMAL MMHG
BH CV XLRA - RV BASE: 3.8 CM
BH CV XLRA - RV LENGTH: 5.9 CM
BH CV XLRA - TDI S': 10.3 CM/SEC
LEFT ATRIUM VOLUME INDEX: 33 ML/M2
LV EF 2D ECHO EST: 57 %
MAXIMAL PREDICTED HEART RATE: 149 BPM
MAXIMAL PREDICTED HEART RATE: 149 BPM
PERCENT MAX PREDICTED HR: 59.06 %
SINUS: 3 CM
STJ: 2.6 CM
STRESS BASELINE BP: NORMAL MMHG
STRESS BASELINE HR: 63 BPM
STRESS O2 SAT REST: 98 %
STRESS PERCENT HR: 69 %
STRESS POST ESTIMATED WORKLOAD: 1 METS
STRESS POST EXERCISE DUR SEC: 30 SEC
STRESS POST O2 SAT PEAK: 100 %
STRESS POST PEAK BP: NORMAL MMHG
STRESS POST PEAK HR: 88 BPM
STRESS TARGET HR: 127 BPM
STRESS TARGET HR: 127 BPM

## 2021-05-27 PROCEDURE — A9500 TC99M SESTAMIBI: HCPCS | Performed by: INTERNAL MEDICINE

## 2021-05-27 PROCEDURE — 25010000002 REGADENOSON 0.4 MG/5ML SOLUTION: Performed by: INTERNAL MEDICINE

## 2021-05-27 PROCEDURE — 0 TECHNETIUM SESTAMIBI: Performed by: INTERNAL MEDICINE

## 2021-05-27 PROCEDURE — 93306 TTE W/DOPPLER COMPLETE: CPT

## 2021-05-27 PROCEDURE — 93016 CV STRESS TEST SUPVJ ONLY: CPT | Performed by: INTERNAL MEDICINE

## 2021-05-27 PROCEDURE — 78452 HT MUSCLE IMAGE SPECT MULT: CPT

## 2021-05-27 PROCEDURE — 25010000002 PERFLUTREN (DEFINITY) 8.476 MG IN SODIUM CHLORIDE (PF) 0.9 % 10 ML INJECTION: Performed by: INTERNAL MEDICINE

## 2021-05-27 PROCEDURE — 78452 HT MUSCLE IMAGE SPECT MULT: CPT | Performed by: INTERNAL MEDICINE

## 2021-05-27 PROCEDURE — 93017 CV STRESS TEST TRACING ONLY: CPT

## 2021-05-27 PROCEDURE — 93018 CV STRESS TEST I&R ONLY: CPT | Performed by: INTERNAL MEDICINE

## 2021-05-27 PROCEDURE — 93306 TTE W/DOPPLER COMPLETE: CPT | Performed by: INTERNAL MEDICINE

## 2021-05-27 RX ADMIN — SODIUM CHLORIDE 1.5 ML: 9 INJECTION INTRAMUSCULAR; INTRAVENOUS; SUBCUTANEOUS at 10:11

## 2021-05-27 RX ADMIN — TECHNETIUM TC 99M SESTAMIBI 1 DOSE: 1 INJECTION INTRAVENOUS at 08:24

## 2021-05-27 RX ADMIN — TECHNETIUM TC 99M SESTAMIBI 1 DOSE: 1 INJECTION INTRAVENOUS at 07:09

## 2021-05-27 RX ADMIN — REGADENOSON 0.4 MG: 0.08 INJECTION, SOLUTION INTRAVENOUS at 08:24

## 2021-05-28 ENCOUNTER — TELEPHONE (OUTPATIENT)
Dept: CARDIOLOGY | Facility: CLINIC | Age: 71
End: 2021-05-28

## 2021-05-28 NOTE — TELEPHONE ENCOUNTER
----- Message from Augusto Gloria III, MD sent at 5/28/2021 12:22 PM EDT -----  Please call- normal results

## 2021-05-28 NOTE — TELEPHONE ENCOUNTER
Notified patient of results. He verbalized understanding.    Brianna Zhou, RN  Triage [facility]

## 2021-10-21 RX ORDER — ISOSORBIDE DINITRATE 30 MG/1
TABLET ORAL
Qty: 180 TABLET | Refills: 3 | Status: SHIPPED | OUTPATIENT
Start: 2021-10-21

## 2021-12-09 ENCOUNTER — OFFICE VISIT (OUTPATIENT)
Dept: CARDIOLOGY | Facility: CLINIC | Age: 71
End: 2021-12-09

## 2021-12-09 VITALS
DIASTOLIC BLOOD PRESSURE: 74 MMHG | RESPIRATION RATE: 18 BRPM | BODY MASS INDEX: 37.22 KG/M2 | HEIGHT: 64 IN | SYSTOLIC BLOOD PRESSURE: 140 MMHG | HEART RATE: 79 BPM | OXYGEN SATURATION: 94 % | WEIGHT: 218 LBS

## 2021-12-09 DIAGNOSIS — N18.30 CHRONIC RENAL FAILURE, STAGE 3 (MODERATE), UNSPECIFIED WHETHER STAGE 3A OR 3B CKD (HCC): Chronic | ICD-10-CM

## 2021-12-09 DIAGNOSIS — G47.33 OSA (OBSTRUCTIVE SLEEP APNEA): ICD-10-CM

## 2021-12-09 DIAGNOSIS — Z86.16 HISTORY OF 2019 NOVEL CORONAVIRUS DISEASE (COVID-19): ICD-10-CM

## 2021-12-09 DIAGNOSIS — R06.09 DYSPNEA ON EXERTION: ICD-10-CM

## 2021-12-09 DIAGNOSIS — I25.118 CORONARY ARTERY DISEASE OF NATIVE ARTERY OF NATIVE HEART WITH STABLE ANGINA PECTORIS (HCC): Primary | ICD-10-CM

## 2021-12-09 DIAGNOSIS — E11.59 TYPE 2 DIABETES MELLITUS WITH OTHER CIRCULATORY COMPLICATION, WITH LONG-TERM CURRENT USE OF INSULIN (HCC): Chronic | ICD-10-CM

## 2021-12-09 DIAGNOSIS — Z79.4 TYPE 2 DIABETES MELLITUS WITH OTHER CIRCULATORY COMPLICATION, WITH LONG-TERM CURRENT USE OF INSULIN (HCC): Chronic | ICD-10-CM

## 2021-12-09 PROCEDURE — 93000 ELECTROCARDIOGRAM COMPLETE: CPT | Performed by: INTERNAL MEDICINE

## 2021-12-09 PROCEDURE — 99214 OFFICE O/P EST MOD 30 MIN: CPT | Performed by: INTERNAL MEDICINE

## 2021-12-09 NOTE — PROGRESS NOTES
Subjective:     Encounter Date:12/09/21      Patient ID: Jett Flower is a 71 y.o. male.    Chief Complaint: CAD  History of Present Illness  This is a pleasant 70-year-old male presents for follow-up.  He has a history of coronary disease and had drug-eluting stent placed in LAD in May 2020.    Continues to have some SOB ever since he had COVID. He had a stress and echo in May 2021 and those sere fine.   Continues to have ongoing back, chest, shoulder pain. No palpitations.    He presented the emergency room [facility] 5/2020 with chest discomfort.  He ruled out for acute myocardial infarction with normal troponin.  He underwent cardiac catheterization and showed stenosis in his LAD and then received drug-eluting stent to LAD.  He did have a prior history of coronary disease.  In 2011 he was diagnosed with CAD and had a stent placed in [place] although we have never received those records.    Patient has a known history of hypertension, hyperlipidemia, renal failure chronic, and diabetes mellitus with circulatory complication in addition to his CAD.    Cardiac catheterization May 2020:  Findings:  1. Coronary Artery Anatomy:  Dominance: Right  Left Main: Angiographically normal  Left Anterior Descending: Moderate in caliber size.  The proximal segment contains luminal irregularities.  There is a patent stent within the mid segment which contains diffuse moderate 50-60 in-stent restenosis within the proximal segment however in the distal segment there is 70% segment stenoses with YING II flow distally.  The distal LAD contains scattered 50% segment stenoses.  There are 2 small diagonal branches which contain luminal irregularities.  Circumflex Artery: Moderate in caliber size.  Contains a 50% mid segment stenoses.  The first marginal is relatively small in size and contains a 70% ostial segment stenoses.  Right Coronary Artery: Large in caliber size.  Contains luminal irregularities throughout.     2.  Hemodynamics:  Left Ventricle: 91/5/8 mmHg  Aorta: 91/52/69 mmHg     3. rFR Assessment:  LAD: 0.86  LCx: 0.93     4. Percutaneous Intervention:  Location: Mid LAD  Treatment: Lesion was predilated with a 2.75 compliant balloon followed by a 3.0 x 10 mm angiosculpt balloon followed by placement of a 2.75 x 33 mm Xience drug-eluting stent which was postdilated with a 3.0 noncompliant balloon in the distal and mid portions and a 3.5 noncompliant balloon in the proximal and mid portions.  Pre-stenosis: 70 %  Post-stenosis: 0 %  Lesion Type C: Yes  YING Flow Pre: 2  YING Flow Post: 3  Bifurcation: No  Severe Calcium: No  Dissection: No     Conclusions:  1. Severe single-vessel coronary artery disease with in-stent restenosis of the mid LAD stent with 70% distal segment stenoses.  Moderate 50% stenoses in the mid circumflex.  70% stenoses of the first marginal.  2. Confirmation of hemodynamic flow-limiting stenosis of the LAD with RFR assessment at 0.86 as well as confirming nonsignificant circumflex stenoses with RFR assessment at 0.93.  3. Successful percutaneous intervention with placement of 2.75 x 33 mm Xience drug-eluting stent which was postdilated distally with a 3.0 mm noncompliant balloon to 20 mery and a 3.5 mm noncompliant balloon proximally to 16 mery with no residual stenosis and restoration of YING-3 flow.       The following portions of the patient's history were reviewed and updated as appropriate: allergies, current medications, past family history, past medical history, past social history, past surgical history and problem list.    Past Medical History:   Diagnosis Date   • Congestive cardiac failure (HCC)    • Coronary artery disease    • COVID-19 07/2020    hospitalized for 24 days   • COVID-19    • Diabetes mellitus (HCC)    • Heart disease    • Hyperlipidemia    • Hypertension    • Shortness of breath        Past Surgical History:   Procedure Laterality Date   • APPENDECTOMY     • BACK SURGERY     •  CARDIAC CATHETERIZATION N/A 5/19/2020    Procedure: Left Heart Cath;  Surgeon: Trae Kemp MD;  Location: [facility] CATH INVASIVE LOCATION;  Service: Cardiovascular;  Laterality: N/A;   • CARDIAC CATHETERIZATION N/A 5/19/2020    Procedure: Coronary angiography;  Surgeon: Trae Kemp MD;  Location: [facility] CATH INVASIVE LOCATION;  Service: Cardiovascular;  Laterality: N/A;   • CARDIAC CATHETERIZATION N/A 5/19/2020    Procedure: Left ventriculography;  Surgeon: Trae Kemp MD;  Location: [facility] CATH INVASIVE LOCATION;  Service: Cardiovascular;  Laterality: N/A;   • CARDIAC CATHETERIZATION N/A 5/19/2020    Procedure: Resting Full Cycle Ratio;  Surgeon: Trae Kemp MD;  Location: [facility] CATH INVASIVE LOCATION;  Service: Cardiovascular;  Laterality: N/A;   • CARDIAC CATHETERIZATION N/A 5/19/2020    Procedure: Stent KENNEDY coronary;  Surgeon: Trae Kemp MD;  Location: [facility] CATH INVASIVE LOCATION;  Service: Cardiovascular;  Laterality: N/A;   • CARDIAC CATHETERIZATION N/A 5/19/2020    Procedure: Percutaneous Coronary Intervention;  Surgeon: Trae Kemp MD;  Location: [facility] CATH INVASIVE LOCATION;  Service: Cardiovascular;  Laterality: N/A;   • CARDIAC SURGERY      x3   • CERVICAL SPINE SURGERY      x4   • CYSTOSCOPY TRANSURETHRAL RESECTION OF PROSTATE N/A 2/5/2021    Procedure: CYSTOSCOPY TRANSURETHRAL RESECTION OF PROSTATE;  Surgeon: Jamar Diez MD;  Location: [facility];  Service: Urology;  Laterality: N/A;   • CYSTOSCOPY W/ LASER LITHOTRIPSY             ECG 12 Lead    Date/Time: 12/9/2021 12:05 PM  Performed by: Augusto Gloria III, MD  Authorized by: Augusto Gloria III, MD   Comparison: compared with previous ECG   Similar to previous ECG  Rhythm: sinus rhythm  Rate: normal  Conduction: conduction normal  ST Segments: ST segments normal  T Waves: T waves normal  QRS axis: normal  Other: no other findings    Clinical impression: normal ECG               Objective:     Vitals:  "   12/09/21 1152   BP: 140/74   Pulse: 79   Resp: 18   SpO2: 94%   Weight: 98.9 kg (218 lb)   Height: 162.6 cm (64\")         Vitals reviewed.   Constitutional:       General: Not in acute distress.     Appearance: Well-developed. Not diaphoretic.   Eyes:      General:         Right eye: No discharge.         Left eye: No discharge.      Conjunctiva/sclera: Conjunctivae normal.      Pupils: Pupils are equal, round, and reactive to light.   HENT:      Head: Normocephalic and atraumatic.      Nose: Nose normal.   Neck:      Thyroid: No thyromegaly.      Trachea: No tracheal deviation.      Lymphadenopathy: No cervical adenopathy.   Pulmonary:      Effort: Pulmonary effort is normal. No respiratory distress.      Breath sounds: Normal breath sounds. No stridor.   Chest:      Chest wall: Not tender to palpatation.   Cardiovascular:      Normal rate. Regular rhythm.      Murmurs: There is no murmur.      . No S3 gallop. No click. No rub.   Pulses:     Intact distal pulses.   Abdominal:      General: Bowel sounds are normal. There is no distension.      Palpations: Abdomen is soft. There is no abdominal mass.      Tenderness: There is no abdominal tenderness. There is no guarding or rebound.   Musculoskeletal: Normal range of motion.         General: No tenderness or deformity.      Cervical back: Normal range of motion and neck supple. Skin:     General: Skin is warm and dry.      Findings: No erythema or rash.   Neurological:      Mental Status: Alert and oriented to person, place, and time.      Deep Tendon Reflexes: Reflexes are normal and symmetric.   Psychiatric:         Thought Content: Thought content normal.         Lab Review:             Results for orders placed during the hospital encounter of 05/27/21    Adult Transthoracic Echo Complete W/ Cont if Necessary Per Protocol    Interpretation Summary  · Left ventricular wall thickness is consistent with mild concentric hypertrophy.  · Estimated left ventricular " EF = 57% Estimated left ventricular EF was in agreement with the calculated left ventricular EF. Left ventricular systolic function is normal.  · Left ventricular diastolic function is consistent with (grade I) impaired relaxation.  · There is mild calcification of the aortic valve mainly affecting the non-coronary cusp(s). The aortic valve appears trileaflet.          Assessment:          Diagnosis Plan   1. Coronary artery disease of native artery of native heart with stable angina pectoris (HCC)  ECG 12 Lead   2. Dyspnea on exertion  ECG 12 Lead   3. Type 2 diabetes mellitus with other circulatory complication, with long-term current use of insulin ([facility])  ECG 12 Lead   4. Chronic renal failure, stage 3 (moderate), unspecified whether stage 3a or 3b CKD ([facility])  ECG 12 Lead   5. MANDEEP (obstructive sleep apnea)  ECG 12 Lead   6. History of 2019 novel coronavirus disease (COVID-19)  ECG 12 Lead          Plan:         1.  CAD, prior stent placement of the LAD in May 2020, stress test 5/201 normal  2.  Chronic renal failure, recent lab work reviewed, prior losartan therapy was discontinued by nephrology when he was hospitalized.  3.  Obstructive sleep apnea,  following with pulmonary  4.  Chronic back pain.   5.  Dyspnea, an echocardiogram 5/2021 was normal    Thank you very much for allowing us to participate in the care of this pleasant patient.  Please don't hesitate to call if I can be of assistance in any way.      Current Outpatient Medications:   •  Aspirin 81 MG capsule, Daily., Disp: , Rfl:   •  clopidogrel (PLAVIX) 75 MG tablet, , Disp: , Rfl:   •  DULoxetine (CYMBALTA) 20 MG capsule, Take 20 mg by mouth., Disp: , Rfl:   •  glipizide (GLUCOTROL) 10 MG tablet, Take 10 mg by mouth 2 (Two) Times a Day Before Meals., Disp: , Rfl:   •  HYDROcodone-acetaminophen (HYCET) 7.5-325 MG/15ML solution, Every 6 (Six) Hours., Disp: , Rfl:   •  insulin glargine (LANTUS) 100 UNIT/ML injection, Inject 30 Units under the skin into  the appropriate area as directed 2 (Two) Times a Day., Disp: , Rfl:   •  insulin lispro (humaLOG) 100 UNIT/ML injection, Inject  under the skin into the appropriate area as directed 3 (Three) Times a Day Before Meals., Disp: , Rfl:   •  Insulin Lispro (HumaLOG) 100 UNIT/ML solution cartridge, See Admin Instructions., Disp: , Rfl:   •  isosorbide dinitrate (ISORDIL) 30 MG tablet, TAKE 2 TABLETS EVERY DAY, Disp: 180 tablet, Rfl: 3  •  ketoconazole (NIZORAL) 2 % cream, , Disp: , Rfl:   •  losartan (COZAAR) 25 MG tablet, , Disp: , Rfl:   •  metFORMIN (GLUCOPHAGE) 500 MG tablet, , Disp: , Rfl:   •  metFORMIN ER (GLUCOPHAGE-XR) 750 MG 24 hr tablet, Take 750 mg by mouth 2 (two) times a day., Disp: , Rfl:   •  metoprolol succinate XL (TOPROL-XL) 50 MG 24 hr tablet, Take 50 mg by mouth 2 (Two) Times a Day., Disp: , Rfl:   •  mupirocin (BACTROBAN) 2 % ointment, , Disp: , Rfl:   •  omeprazole (priLOSEC) 40 MG capsule, Take 40 mg by mouth Daily., Disp: , Rfl:   •  simvastatin (ZOCOR) 40 MG tablet, Take 40 mg by mouth Daily., Disp: , Rfl:   •  tamsulosin (FLOMAX) 0.4 MG capsule 24 hr capsule, Take 1 capsule by mouth Daily., Disp: , Rfl:   •  fluticasone (FLONASE) 50 MCG/ACT nasal spray, 2 sprays into the nostril(s) as directed by provider Daily for 7 days., Disp: 9.9 mL, Rfl: 0         No follow-ups on file.

## 2024-12-07 ENCOUNTER — APPOINTMENT (OUTPATIENT)
Dept: CT IMAGING | Facility: HOSPITAL | Age: 74
DRG: 438 | End: 2024-12-07
Payer: MEDICARE

## 2024-12-07 ENCOUNTER — APPOINTMENT (OUTPATIENT)
Dept: GENERAL RADIOLOGY | Facility: HOSPITAL | Age: 74
DRG: 438 | End: 2024-12-07
Payer: MEDICARE

## 2024-12-07 ENCOUNTER — HOSPITAL ENCOUNTER (INPATIENT)
Facility: HOSPITAL | Age: 74
LOS: 1 days | Discharge: LEFT BEFORE DISCHARGE | DRG: 438 | End: 2024-12-07
Attending: EMERGENCY MEDICINE | Admitting: STUDENT IN AN ORGANIZED HEALTH CARE EDUCATION/TRAINING PROGRAM
Payer: MEDICARE

## 2024-12-07 VITALS
TEMPERATURE: 97.8 F | HEIGHT: 64 IN | WEIGHT: 195 LBS | OXYGEN SATURATION: 95 % | DIASTOLIC BLOOD PRESSURE: 73 MMHG | RESPIRATION RATE: 16 BRPM | HEART RATE: 113 BPM | SYSTOLIC BLOOD PRESSURE: 127 MMHG | BODY MASS INDEX: 33.29 KG/M2

## 2024-12-07 DIAGNOSIS — E08.10 DIABETIC KETOACIDOSIS WITHOUT COMA ASSOCIATED WITH DIABETES MELLITUS DUE TO UNDERLYING CONDITION: ICD-10-CM

## 2024-12-07 DIAGNOSIS — K85.00 IDIOPATHIC ACUTE PANCREATITIS WITHOUT INFECTION OR NECROSIS: Primary | ICD-10-CM

## 2024-12-07 PROBLEM — K85.90 PANCREATITIS: Status: ACTIVE | Noted: 2024-12-07

## 2024-12-07 LAB
ACETONE BLD QL: NEGATIVE
ALBUMIN SERPL-MCNC: 3.7 G/DL (ref 3.5–5.2)
ALBUMIN SERPL-MCNC: 4 G/DL (ref 3.5–5.2)
ALBUMIN/GLOB SERPL: 1 G/DL
ALBUMIN/GLOB SERPL: 1.1 G/DL
ALP SERPL-CCNC: 120 U/L (ref 39–117)
ALP SERPL-CCNC: 121 U/L (ref 39–117)
ALT SERPL W P-5'-P-CCNC: 6 U/L (ref 1–41)
ALT SERPL W P-5'-P-CCNC: 9 U/L (ref 1–41)
ANION GAP SERPL CALCULATED.3IONS-SCNC: 17.3 MMOL/L (ref 5–15)
ANION GAP SERPL CALCULATED.3IONS-SCNC: 18.8 MMOL/L (ref 5–15)
ANION GAP SERPL CALCULATED.3IONS-SCNC: 20.8 MMOL/L (ref 5–15)
AST SERPL-CCNC: 13 U/L (ref 1–40)
AST SERPL-CCNC: 14 U/L (ref 1–40)
ATMOSPHERIC PRESS: 744 MMHG
BACTERIA UR QL AUTO: ABNORMAL /HPF
BASE EXCESS BLDV CALC-SCNC: -2.6 MMOL/L (ref 0–2)
BASOPHILS # BLD AUTO: 0.1 10*3/MM3 (ref 0–0.2)
BASOPHILS NFR BLD AUTO: 1 % (ref 0–1.5)
BDY SITE: ABNORMAL
BILIRUB SERPL-MCNC: 0.4 MG/DL (ref 0–1.2)
BILIRUB SERPL-MCNC: 0.4 MG/DL (ref 0–1.2)
BILIRUB UR QL STRIP: NEGATIVE
BODY TEMPERATURE: 37
BUN SERPL-MCNC: 21 MG/DL (ref 8–23)
BUN SERPL-MCNC: 22 MG/DL (ref 8–23)
BUN SERPL-MCNC: 23 MG/DL (ref 8–23)
BUN/CREAT SERPL: 16.8 (ref 7–25)
BUN/CREAT SERPL: 17.2 (ref 7–25)
BUN/CREAT SERPL: 18.4 (ref 7–25)
CALCIUM SPEC-SCNC: 9.1 MG/DL (ref 8.6–10.5)
CALCIUM SPEC-SCNC: 9.3 MG/DL (ref 8.6–10.5)
CALCIUM SPEC-SCNC: 9.5 MG/DL (ref 8.6–10.5)
CHLORIDE SERPL-SCNC: 87 MMOL/L (ref 98–107)
CHLORIDE SERPL-SCNC: 87 MMOL/L (ref 98–107)
CHLORIDE SERPL-SCNC: 92 MMOL/L (ref 98–107)
CLARITY UR: CLEAR
CO2 SERPL-SCNC: 18.2 MMOL/L (ref 22–29)
CO2 SERPL-SCNC: 19.2 MMOL/L (ref 22–29)
CO2 SERPL-SCNC: 20.7 MMOL/L (ref 22–29)
COLOR UR: ABNORMAL
CREAT SERPL-MCNC: 1.25 MG/DL (ref 0.76–1.27)
CREAT SERPL-MCNC: 1.25 MG/DL (ref 0.76–1.27)
CREAT SERPL-MCNC: 1.28 MG/DL (ref 0.76–1.27)
D-LACTATE SERPL-SCNC: 2 MMOL/L (ref 0.5–2)
D-LACTATE SERPL-SCNC: 2.3 MMOL/L (ref 0.5–2)
DEPRECATED RDW RBC AUTO: 42 FL (ref 37–54)
EGFRCR SERPLBLD CKD-EPI 2021: 58.7 ML/MIN/1.73
EGFRCR SERPLBLD CKD-EPI 2021: 60.4 ML/MIN/1.73
EGFRCR SERPLBLD CKD-EPI 2021: 60.4 ML/MIN/1.73
EOSINOPHIL # BLD AUTO: 0.05 10*3/MM3 (ref 0–0.4)
EOSINOPHIL NFR BLD AUTO: 0.5 % (ref 0.3–6.2)
ERYTHROCYTE [DISTWIDTH] IN BLOOD BY AUTOMATED COUNT: 13.1 % (ref 12.3–15.4)
GEN 5 1HR TROPONIN T REFLEX: 22 NG/L
GLOBULIN UR ELPH-MCNC: 3.5 GM/DL
GLOBULIN UR ELPH-MCNC: 3.6 GM/DL
GLUCOSE BLDC GLUCOMTR-MCNC: 364 MG/DL (ref 70–130)
GLUCOSE BLDC GLUCOMTR-MCNC: 408 MG/DL (ref 70–130)
GLUCOSE BLDC GLUCOMTR-MCNC: 445 MG/DL (ref 70–130)
GLUCOSE SERPL-MCNC: 435 MG/DL (ref 65–99)
GLUCOSE SERPL-MCNC: 564 MG/DL (ref 65–99)
GLUCOSE SERPL-MCNC: 599 MG/DL (ref 65–99)
GLUCOSE UR STRIP-MCNC: ABNORMAL MG/DL
HBA1C MFR BLD: 14.3 % (ref 4.8–5.6)
HCO3 BLDV-SCNC: 24.8 MMOL/L (ref 22–28)
HCT VFR BLD AUTO: 51.4 % (ref 37.5–51)
HGB BLD-MCNC: 17.6 G/DL (ref 13–17.7)
HGB BLDA-MCNC: 17.7 G/DL (ref 14–18)
HGB UR QL STRIP.AUTO: ABNORMAL
HOLD SPECIMEN: NORMAL
HOLD SPECIMEN: NORMAL
HYALINE CASTS UR QL AUTO: ABNORMAL /LPF
IMM GRANULOCYTES # BLD AUTO: 0.03 10*3/MM3 (ref 0–0.05)
IMM GRANULOCYTES NFR BLD AUTO: 0.3 % (ref 0–0.5)
KETONES UR QL STRIP: ABNORMAL
LEUKOCYTE ESTERASE UR QL STRIP.AUTO: NEGATIVE
LIPASE SERPL-CCNC: 241 U/L (ref 13–60)
LYMPHOCYTES # BLD AUTO: 2.33 10*3/MM3 (ref 0.7–3.1)
LYMPHOCYTES NFR BLD AUTO: 22.9 % (ref 19.6–45.3)
Lab: ABNORMAL
MAGNESIUM SERPL-MCNC: 1.9 MG/DL (ref 1.6–2.4)
MAGNESIUM SERPL-MCNC: 1.9 MG/DL (ref 1.6–2.4)
MCH RBC QN AUTO: 30.6 PG (ref 26.6–33)
MCHC RBC AUTO-ENTMCNC: 34.2 G/DL (ref 31.5–35.7)
MCV RBC AUTO: 89.2 FL (ref 79–97)
MODALITY: ABNORMAL
MONOCYTES # BLD AUTO: 0.66 10*3/MM3 (ref 0.1–0.9)
MONOCYTES NFR BLD AUTO: 6.5 % (ref 5–12)
NEUTROPHILS NFR BLD AUTO: 6.99 10*3/MM3 (ref 1.7–7)
NEUTROPHILS NFR BLD AUTO: 68.8 % (ref 42.7–76)
NITRITE UR QL STRIP: NEGATIVE
NOTIFIED WHO: ABNORMAL
NRBC BLD AUTO-RTO: 0 /100 WBC (ref 0–0.2)
PCO2 BLDV: 51.1 MM HG (ref 41–51)
PH BLDV: 7.29 PH UNITS (ref 7.32–7.42)
PH UR STRIP.AUTO: 5.5 [PH] (ref 4.5–8)
PHOSPHATE SERPL-MCNC: 3.8 MG/DL (ref 2.5–4.5)
PHOSPHATE SERPL-MCNC: 4.4 MG/DL (ref 2.5–4.5)
PLATELET # BLD AUTO: 193 10*3/MM3 (ref 140–450)
PMV BLD AUTO: 12 FL (ref 6–12)
PO2 BLDV: <30.1 MM HG (ref 27–53)
POTASSIUM SERPL-SCNC: 4.7 MMOL/L (ref 3.5–5.2)
POTASSIUM SERPL-SCNC: 5 MMOL/L (ref 3.5–5.2)
POTASSIUM SERPL-SCNC: 5.3 MMOL/L (ref 3.5–5.2)
PROT SERPL-MCNC: 7.3 G/DL (ref 6–8.5)
PROT SERPL-MCNC: 7.5 G/DL (ref 6–8.5)
PROT UR QL STRIP: ABNORMAL
QT INTERVAL: 324 MS
QTC INTERVAL: 431 MS
RBC # BLD AUTO: 5.76 10*6/MM3 (ref 4.14–5.8)
RBC # UR STRIP: ABNORMAL /HPF
REF LAB TEST METHOD: ABNORMAL
SAO2 % BLDCOV: 36.7 % (ref 45–75)
SODIUM SERPL-SCNC: 124 MMOL/L (ref 136–145)
SODIUM SERPL-SCNC: 127 MMOL/L (ref 136–145)
SODIUM SERPL-SCNC: 130 MMOL/L (ref 136–145)
SP GR UR STRIP: 1.01 (ref 1–1.03)
SQUAMOUS #/AREA URNS HPF: ABNORMAL /HPF
TROPONIN T NUMERIC DELTA: 0 NG/L
TROPONIN T SERPL HS-MCNC: 22 NG/L
UROBILINOGEN UR QL STRIP: ABNORMAL
WBC # UR STRIP: ABNORMAL /HPF
WBC NRBC COR # BLD AUTO: 10.16 10*3/MM3 (ref 3.4–10.8)
WHOLE BLOOD HOLD COAG: NORMAL
WHOLE BLOOD HOLD SPECIMEN: NORMAL

## 2024-12-07 PROCEDURE — 99285 EMERGENCY DEPT VISIT HI MDM: CPT | Performed by: EMERGENCY MEDICINE

## 2024-12-07 PROCEDURE — 82948 REAGENT STRIP/BLOOD GLUCOSE: CPT

## 2024-12-07 PROCEDURE — 93010 ELECTROCARDIOGRAM REPORT: CPT | Performed by: INTERNAL MEDICINE

## 2024-12-07 PROCEDURE — 93005 ELECTROCARDIOGRAM TRACING: CPT | Performed by: EMERGENCY MEDICINE

## 2024-12-07 PROCEDURE — 25810000003 SODIUM CHLORIDE 0.9 % SOLUTION: Performed by: STUDENT IN AN ORGANIZED HEALTH CARE EDUCATION/TRAINING PROGRAM

## 2024-12-07 PROCEDURE — 81001 URINALYSIS AUTO W/SCOPE: CPT | Performed by: EMERGENCY MEDICINE

## 2024-12-07 PROCEDURE — 36415 COLL VENOUS BLD VENIPUNCTURE: CPT

## 2024-12-07 PROCEDURE — 63710000001 INSULIN REGULAR HUMAN PER 5 UNITS: Performed by: EMERGENCY MEDICINE

## 2024-12-07 PROCEDURE — 71045 X-RAY EXAM CHEST 1 VIEW: CPT

## 2024-12-07 PROCEDURE — 85025 COMPLETE CBC W/AUTO DIFF WBC: CPT | Performed by: EMERGENCY MEDICINE

## 2024-12-07 PROCEDURE — 83036 HEMOGLOBIN GLYCOSYLATED A1C: CPT | Performed by: STUDENT IN AN ORGANIZED HEALTH CARE EDUCATION/TRAINING PROGRAM

## 2024-12-07 PROCEDURE — 83735 ASSAY OF MAGNESIUM: CPT | Performed by: STUDENT IN AN ORGANIZED HEALTH CARE EDUCATION/TRAINING PROGRAM

## 2024-12-07 PROCEDURE — 25810000003 SODIUM CHLORIDE 0.9 % SOLUTION: Performed by: EMERGENCY MEDICINE

## 2024-12-07 PROCEDURE — 25010000002 ONDANSETRON PER 1 MG: Performed by: EMERGENCY MEDICINE

## 2024-12-07 PROCEDURE — 83930 ASSAY OF BLOOD OSMOLALITY: CPT | Performed by: STUDENT IN AN ORGANIZED HEALTH CARE EDUCATION/TRAINING PROGRAM

## 2024-12-07 PROCEDURE — 84484 ASSAY OF TROPONIN QUANT: CPT | Performed by: EMERGENCY MEDICINE

## 2024-12-07 PROCEDURE — 25010000002 FENTANYL CITRATE (PF) 50 MCG/ML SOLUTION

## 2024-12-07 PROCEDURE — 99222 1ST HOSP IP/OBS MODERATE 55: CPT | Performed by: STUDENT IN AN ORGANIZED HEALTH CARE EDUCATION/TRAINING PROGRAM

## 2024-12-07 PROCEDURE — 87040 BLOOD CULTURE FOR BACTERIA: CPT | Performed by: EMERGENCY MEDICINE

## 2024-12-07 PROCEDURE — 80053 COMPREHEN METABOLIC PANEL: CPT | Performed by: EMERGENCY MEDICINE

## 2024-12-07 PROCEDURE — 25010000002 HYDROMORPHONE PER 4 MG: Performed by: STUDENT IN AN ORGANIZED HEALTH CARE EDUCATION/TRAINING PROGRAM

## 2024-12-07 PROCEDURE — 74176 CT ABD & PELVIS W/O CONTRAST: CPT

## 2024-12-07 PROCEDURE — 82009 KETONE BODYS QUAL: CPT | Performed by: STUDENT IN AN ORGANIZED HEALTH CARE EDUCATION/TRAINING PROGRAM

## 2024-12-07 PROCEDURE — 85025 COMPLETE CBC W/AUTO DIFF WBC: CPT | Performed by: STUDENT IN AN ORGANIZED HEALTH CARE EDUCATION/TRAINING PROGRAM

## 2024-12-07 PROCEDURE — 25010000002 FENTANYL CITRATE (PF) 50 MCG/ML SOLUTION: Performed by: EMERGENCY MEDICINE

## 2024-12-07 PROCEDURE — 94761 N-INVAS EAR/PLS OXIMETRY MLT: CPT

## 2024-12-07 PROCEDURE — 25010000002 HYDROMORPHONE 1 MG/ML SOLUTION

## 2024-12-07 PROCEDURE — 83605 ASSAY OF LACTIC ACID: CPT | Performed by: EMERGENCY MEDICINE

## 2024-12-07 PROCEDURE — 84100 ASSAY OF PHOSPHORUS: CPT | Performed by: STUDENT IN AN ORGANIZED HEALTH CARE EDUCATION/TRAINING PROGRAM

## 2024-12-07 PROCEDURE — 82803 BLOOD GASES ANY COMBINATION: CPT

## 2024-12-07 PROCEDURE — 83690 ASSAY OF LIPASE: CPT | Performed by: EMERGENCY MEDICINE

## 2024-12-07 RX ORDER — FENTANYL CITRATE 50 UG/ML
INJECTION, SOLUTION INTRAMUSCULAR; INTRAVENOUS
Status: COMPLETED
Start: 2024-12-07 | End: 2024-12-07

## 2024-12-07 RX ORDER — DEXTROSE MONOHYDRATE, SODIUM CHLORIDE, AND POTASSIUM CHLORIDE 50; 1.49; 4.5 G/1000ML; G/1000ML; G/1000ML
125 INJECTION, SOLUTION INTRAVENOUS CONTINUOUS PRN
Status: DISCONTINUED | OUTPATIENT
Start: 2024-12-07 | End: 2024-12-07 | Stop reason: HOSPADM

## 2024-12-07 RX ORDER — SODIUM CHLORIDE AND POTASSIUM CHLORIDE 150; 450 MG/100ML; MG/100ML
200 INJECTION, SOLUTION INTRAVENOUS CONTINUOUS PRN
Status: DISCONTINUED | OUTPATIENT
Start: 2024-12-07 | End: 2024-12-07 | Stop reason: HOSPADM

## 2024-12-07 RX ORDER — LOSARTAN POTASSIUM 50 MG/1
25 TABLET ORAL
Status: DISCONTINUED | OUTPATIENT
Start: 2024-12-07 | End: 2024-12-07 | Stop reason: HOSPADM

## 2024-12-07 RX ORDER — DEXTROSE MONOHYDRATE 25 G/50ML
25 INJECTION, SOLUTION INTRAVENOUS
Status: DISCONTINUED | OUTPATIENT
Start: 2024-12-07 | End: 2024-12-07 | Stop reason: HOSPADM

## 2024-12-07 RX ORDER — SODIUM CHLORIDE AND POTASSIUM CHLORIDE 150; 900 MG/100ML; MG/100ML
200 INJECTION, SOLUTION INTRAVENOUS CONTINUOUS PRN
Status: DISCONTINUED | OUTPATIENT
Start: 2024-12-07 | End: 2024-12-07 | Stop reason: HOSPADM

## 2024-12-07 RX ORDER — HYDROCODONE BITARTRATE AND ACETAMINOPHEN 7.5; 325 MG/15ML; MG/15ML
15 SOLUTION ORAL EVERY 6 HOURS PRN
Status: DISCONTINUED | OUTPATIENT
Start: 2024-12-07 | End: 2024-12-07 | Stop reason: HOSPADM

## 2024-12-07 RX ORDER — CLOPIDOGREL BISULFATE 75 MG/1
75 TABLET ORAL DAILY
Status: DISCONTINUED | OUTPATIENT
Start: 2024-12-07 | End: 2024-12-07

## 2024-12-07 RX ORDER — ONDANSETRON 2 MG/ML
8 INJECTION INTRAMUSCULAR; INTRAVENOUS ONCE
Status: COMPLETED | OUTPATIENT
Start: 2024-12-07 | End: 2024-12-07

## 2024-12-07 RX ORDER — ASPIRIN 81 MG/1
81 TABLET, CHEWABLE ORAL DAILY
Status: DISCONTINUED | OUTPATIENT
Start: 2024-12-07 | End: 2024-12-07 | Stop reason: HOSPADM

## 2024-12-07 RX ORDER — MUPIROCIN 20 MG/G
OINTMENT TOPICAL
Status: DISCONTINUED | OUTPATIENT
Start: 2024-12-07 | End: 2024-12-07 | Stop reason: HOSPADM

## 2024-12-07 RX ORDER — DEXTROSE MONOHYDRATE, SODIUM CHLORIDE, AND POTASSIUM CHLORIDE 50; 2.98; 9 G/1000ML; G/1000ML; G/1000ML
125 INJECTION, SOLUTION INTRAVENOUS CONTINUOUS PRN
Status: DISCONTINUED | OUTPATIENT
Start: 2024-12-07 | End: 2024-12-07 | Stop reason: HOSPADM

## 2024-12-07 RX ORDER — INSULIN LISPRO 100 [IU]/ML
3-14 INJECTION, SOLUTION INTRAVENOUS; SUBCUTANEOUS
Status: DISCONTINUED | OUTPATIENT
Start: 2024-12-07 | End: 2024-12-07 | Stop reason: HOSPADM

## 2024-12-07 RX ORDER — PANTOPRAZOLE SODIUM 40 MG/1
40 TABLET, DELAYED RELEASE ORAL
Status: DISCONTINUED | OUTPATIENT
Start: 2024-12-08 | End: 2024-12-07 | Stop reason: HOSPADM

## 2024-12-07 RX ORDER — HYDROMORPHONE HYDROCHLORIDE 1 MG/ML
0.5 INJECTION, SOLUTION INTRAMUSCULAR; INTRAVENOUS; SUBCUTANEOUS
Status: DISCONTINUED | OUTPATIENT
Start: 2024-12-07 | End: 2024-12-07 | Stop reason: HOSPADM

## 2024-12-07 RX ORDER — IBUPROFEN 600 MG/1
1 TABLET ORAL
Status: DISCONTINUED | OUTPATIENT
Start: 2024-12-07 | End: 2024-12-07 | Stop reason: HOSPADM

## 2024-12-07 RX ORDER — NICOTINE POLACRILEX 4 MG
15 LOZENGE BUCCAL
Status: DISCONTINUED | OUTPATIENT
Start: 2024-12-07 | End: 2024-12-07 | Stop reason: HOSPADM

## 2024-12-07 RX ORDER — DEXTROSE MONOHYDRATE, SODIUM CHLORIDE, AND POTASSIUM CHLORIDE 50; 2.98; 4.5 G/1000ML; G/1000ML; G/1000ML
125 INJECTION, SOLUTION INTRAVENOUS CONTINUOUS PRN
Status: DISCONTINUED | OUTPATIENT
Start: 2024-12-07 | End: 2024-12-07 | Stop reason: HOSPADM

## 2024-12-07 RX ORDER — FENTANYL CITRATE 50 UG/ML
50 INJECTION, SOLUTION INTRAMUSCULAR; INTRAVENOUS ONCE
Status: COMPLETED | OUTPATIENT
Start: 2024-12-07 | End: 2024-12-07

## 2024-12-07 RX ORDER — DULOXETIN HYDROCHLORIDE 20 MG/1
20 CAPSULE, DELAYED RELEASE ORAL DAILY
Status: DISCONTINUED | OUTPATIENT
Start: 2024-12-07 | End: 2024-12-07 | Stop reason: HOSPADM

## 2024-12-07 RX ORDER — TAMSULOSIN HYDROCHLORIDE 0.4 MG/1
0.4 CAPSULE ORAL DAILY
Status: DISCONTINUED | OUTPATIENT
Start: 2024-12-07 | End: 2024-12-07 | Stop reason: HOSPADM

## 2024-12-07 RX ORDER — SODIUM CHLORIDE 9 MG/ML
40 INJECTION, SOLUTION INTRAVENOUS AS NEEDED
Status: DISCONTINUED | OUTPATIENT
Start: 2024-12-07 | End: 2024-12-07 | Stop reason: HOSPADM

## 2024-12-07 RX ORDER — FAMOTIDINE 10 MG/ML
20 INJECTION, SOLUTION INTRAVENOUS ONCE
Status: COMPLETED | OUTPATIENT
Start: 2024-12-07 | End: 2024-12-07

## 2024-12-07 RX ORDER — KETOCONAZOLE 20 MG/G
CREAM TOPICAL DAILY PRN
Status: DISCONTINUED | OUTPATIENT
Start: 2024-12-07 | End: 2024-12-07 | Stop reason: HOSPADM

## 2024-12-07 RX ORDER — DEXTROSE MONOHYDRATE 25 G/50ML
10-50 INJECTION, SOLUTION INTRAVENOUS
Status: DISCONTINUED | OUTPATIENT
Start: 2024-12-07 | End: 2024-12-07 | Stop reason: HOSPADM

## 2024-12-07 RX ORDER — SODIUM CHLORIDE 9 MG/ML
200 INJECTION, SOLUTION INTRAVENOUS CONTINUOUS PRN
Status: DISCONTINUED | OUTPATIENT
Start: 2024-12-07 | End: 2024-12-07 | Stop reason: HOSPADM

## 2024-12-07 RX ORDER — FLUTICASONE PROPIONATE 50 MCG
2 SPRAY, SUSPENSION (ML) NASAL DAILY
Status: DISCONTINUED | OUTPATIENT
Start: 2024-12-07 | End: 2024-12-07 | Stop reason: HOSPADM

## 2024-12-07 RX ORDER — ISOSORBIDE DINITRATE 10 MG/1
30 TABLET ORAL DAILY
Status: DISCONTINUED | OUTPATIENT
Start: 2024-12-07 | End: 2024-12-07 | Stop reason: HOSPADM

## 2024-12-07 RX ORDER — LORAZEPAM 0.5 MG/1
0.5 TABLET ORAL ONCE
Status: DISCONTINUED | OUTPATIENT
Start: 2024-12-07 | End: 2024-12-07 | Stop reason: HOSPADM

## 2024-12-07 RX ORDER — SODIUM CHLORIDE 0.9 % (FLUSH) 0.9 %
10 SYRINGE (ML) INJECTION EVERY 12 HOURS SCHEDULED
Status: DISCONTINUED | OUTPATIENT
Start: 2024-12-07 | End: 2024-12-07 | Stop reason: HOSPADM

## 2024-12-07 RX ORDER — SODIUM CHLORIDE AND POTASSIUM CHLORIDE 300; 900 MG/100ML; MG/100ML
200 INJECTION, SOLUTION INTRAVENOUS CONTINUOUS PRN
Status: DISCONTINUED | OUTPATIENT
Start: 2024-12-07 | End: 2024-12-07 | Stop reason: HOSPADM

## 2024-12-07 RX ORDER — SODIUM CHLORIDE 0.9 % (FLUSH) 0.9 %
10 SYRINGE (ML) INJECTION AS NEEDED
Status: DISCONTINUED | OUTPATIENT
Start: 2024-12-07 | End: 2024-12-07 | Stop reason: HOSPADM

## 2024-12-07 RX ORDER — DEXTROSE MONOHYDRATE, SODIUM CHLORIDE, AND POTASSIUM CHLORIDE 50; 1.49; 9 G/1000ML; G/1000ML; G/1000ML
125 INJECTION, SOLUTION INTRAVENOUS CONTINUOUS PRN
Status: DISCONTINUED | OUTPATIENT
Start: 2024-12-07 | End: 2024-12-07 | Stop reason: HOSPADM

## 2024-12-07 RX ORDER — DEXTROSE MONOHYDRATE AND SODIUM CHLORIDE 5; .45 G/100ML; G/100ML
125 INJECTION, SOLUTION INTRAVENOUS CONTINUOUS PRN
Status: DISCONTINUED | OUTPATIENT
Start: 2024-12-07 | End: 2024-12-07 | Stop reason: HOSPADM

## 2024-12-07 RX ORDER — METOPROLOL SUCCINATE 50 MG/1
50 TABLET, EXTENDED RELEASE ORAL EVERY 12 HOURS SCHEDULED
Status: DISCONTINUED | OUTPATIENT
Start: 2024-12-07 | End: 2024-12-07 | Stop reason: HOSPADM

## 2024-12-07 RX ORDER — SODIUM CHLORIDE 450 MG/100ML
200 INJECTION, SOLUTION INTRAVENOUS CONTINUOUS PRN
Status: DISCONTINUED | OUTPATIENT
Start: 2024-12-07 | End: 2024-12-07 | Stop reason: HOSPADM

## 2024-12-07 RX ORDER — DEXTROSE MONOHYDRATE AND SODIUM CHLORIDE 5; .9 G/100ML; G/100ML
125 INJECTION, SOLUTION INTRAVENOUS CONTINUOUS PRN
Status: DISCONTINUED | OUTPATIENT
Start: 2024-12-07 | End: 2024-12-07 | Stop reason: HOSPADM

## 2024-12-07 RX ORDER — ATORVASTATIN CALCIUM 40 MG/1
40 TABLET, FILM COATED ORAL DAILY
Status: DISCONTINUED | OUTPATIENT
Start: 2024-12-07 | End: 2024-12-07 | Stop reason: HOSPADM

## 2024-12-07 RX ADMIN — FENTANYL CITRATE 50 MCG: 50 INJECTION, SOLUTION INTRAMUSCULAR; INTRAVENOUS at 11:54

## 2024-12-07 RX ADMIN — Medication 1 MG: at 15:20

## 2024-12-07 RX ADMIN — FENTANYL CITRATE 50 MCG: 50 INJECTION, SOLUTION INTRAMUSCULAR; INTRAVENOUS at 14:09

## 2024-12-07 RX ADMIN — FAMOTIDINE 20 MG: 10 INJECTION, SOLUTION INTRAVENOUS at 11:54

## 2024-12-07 RX ADMIN — ONDANSETRON 8 MG: 2 INJECTION INTRAMUSCULAR; INTRAVENOUS at 11:49

## 2024-12-07 RX ADMIN — HYDROMORPHONE HYDROCHLORIDE 0.5 MG: 1 INJECTION, SOLUTION INTRAMUSCULAR; INTRAVENOUS; SUBCUTANEOUS at 17:51

## 2024-12-07 RX ADMIN — SODIUM CHLORIDE 1000 ML: 9 INJECTION, SOLUTION INTRAVENOUS at 13:52

## 2024-12-07 RX ADMIN — INSULIN HUMAN 10 UNITS: 100 INJECTION, SOLUTION PARENTERAL at 14:44

## 2024-12-07 RX ADMIN — SODIUM CHLORIDE 1000 ML/HR: 9 INJECTION, SOLUTION INTRAVENOUS at 17:49

## 2024-12-07 RX ADMIN — HYDROMORPHONE HYDROCHLORIDE 1 MG: 1 INJECTION, SOLUTION INTRAMUSCULAR; INTRAVENOUS; SUBCUTANEOUS at 15:20

## 2024-12-07 RX ADMIN — INSULIN HUMAN 3.5 UNITS/HR: 1 INJECTION, SOLUTION INTRAVENOUS at 17:54

## 2024-12-07 NOTE — ED PROVIDER NOTES
Subjective   History of Present Illness    Chief complaint: Abdominal pain    Location: Upper    Quality/Severity: Severe    Timing/Onset/Duration: Over the last few days    Modifying Factors: No modifying factors    Associated Symptoms: No headache.  No fever or chills.  Patient has chronic cough and shortness of breath.  Patient complains of chest pressure.  The pressure started just prior to arrival.  No diarrhea or burning on urination.  No black or bloody stools.    Narrative: This 74-year-old white male presents with abdominal pain for the last few days.  Patient's had some nausea.  No vomiting or constipation.  Patient is on Plavix, and a diabetic.  The patient has history of coronary artery disease, coronary bypass graft, appendectomy, back surgery, and cystoscopy with laser lithotripsy and TURP.  The patient has had nausea for days.    PCP:Trae Groves DO      Review of Systems   Constitutional:  Negative for chills and fever.   HENT:  Negative for congestion, ear pain and sore throat.    Respiratory:  Positive for cough (Chronic, no worse than usual) and shortness of breath (Chronic, no worse than usual).    Cardiovascular:  Positive for chest pain.   Gastrointestinal:  Positive for abdominal pain and nausea. Negative for diarrhea and vomiting.   Genitourinary:  Negative for difficulty urinating.   Musculoskeletal:  Positive for back pain (Chronic, no worse than usual).        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   Neurological:  Negative for weakness, numbness and headaches.         Past Medical History:   Diagnosis Date    Congestive cardiac failure     Coronary artery disease     COVID-19 07/2020    hospitalized for 24 days    COVID-19     Diabetes mellitus     Heart disease     Hyperlipidemia     Hypertension     Shortness of breath        Allergies   Allergen Reactions    Gabapentin Unknown - Low Severity and Unknown - High Severity       Past  Surgical History:   Procedure Laterality Date    APPENDECTOMY      BACK SURGERY      CARDIAC CATHETERIZATION N/A 5/19/2020    Procedure: Left Heart Cath;  Surgeon: Trae Kemp MD;  Location: [facility] CATH INVASIVE LOCATION;  Service: Cardiovascular;  Laterality: N/A;    CARDIAC CATHETERIZATION N/A 5/19/2020    Procedure: Coronary angiography;  Surgeon: Trae Kemp MD;  Location[facility] CATH INVASIVE LOC[facility];  Service: Cardiovascular;  Laterality: N/A;    CARDIAC CATHETERIZATION N/A 5/19/2020    Procedure: Left ventriculography;  Surgeon: Trae Kemp MD;  [facility];  Service: Cardiovascular;  Laterality: N/A;    CARDIAC CATHETERIZATION N/A 5/19/2020    Procedure: Resting Full Cycle Ratio;  Surgeon: Trae Kemp MD;  Location: [facility] CATH INVASIVE LOCATION;  Service: Cardiovascular;  Laterality: N/A;    CARDIAC CATHETERIZATION N/A 5/19/2020    Procedure: Stent KENNEDY coronary;  Surgeon: Trae Kemp MD;  Location: [facility] CATH INVASIVE LOCATION;  Service: Cardiovascular;  Laterality: N/A;    CARDIAC CATHETERIZATION N/A 5/19/2020    Procedure: Percutaneous Coronary Intervention;  Surgeon: Trae Kemp MD;  Location: [facility] CATH INVASIVE LOCATION;  Service: Cardiovascular;  Laterality: N/A;    CARDIAC SURGERY      x3    CERVICAL SPINE SURGERY      x4    CYSTOSCOPY TRANSURETHRAL RESECTION OF PROSTATE N/A 2/5/2021    Procedure: CYSTOSCOPY TRANSURETHRAL RESECTION OF PROSTATE;  Surgeon: Jamar Diez MD;  Location: [facility];  Service: Urology;  Laterality: N/A;    CYSTOSCOPY W/ LASER LITHOTRIPSY         Family History   Problem Relation Age of Onset    Heart attack Mother     Emphysema Father     Heart attack Father     Stroke Father     Arthritis Father     Arthritis Sister     Sudden death Brother         shot    No Known Problems Daughter     No Known Problems Son     No Known Problems Maternal Grandmother     No Known Problems Maternal Grandfather      Emphysema Paternal Grandmother     Diabetes Paternal Grandfather     No Known Problems Sister     No Known Problems Sister     No Known Problems Sister     No Known Problems Sister     No Known Problems Sister     No Known Problems Daughter     No Known Problems Daughter        Social History     Socioeconomic History    Marital status:    Tobacco Use    Smoking status: Never    Smokeless tobacco: Never   Vaping Use    Vaping status: Never Used   Substance and Sexual Activity    Alcohol use: Not Currently     Comment: Caffeine use: 2-3 cokes a week    Drug use: Never    Sexual activity: Defer           Objective   Physical Exam  Vitals (Temperature is 97.6 °F, pulse 112, respirations 18, /101, room air pulse ox 98%) and nursing note reviewed.   Constitutional:       Appearance: He is well-developed.   HENT:      Head: Normocephalic and atraumatic.      Mouth/Throat:      Mouth: Mucous membranes are moist.   Cardiovascular:      Rate and Rhythm: Normal rate and regular rhythm.      Heart sounds: Normal heart sounds. No murmur heard.     No friction rub. No gallop.   Pulmonary:      Effort: Pulmonary effort is normal.      Breath sounds: Normal breath sounds.   Abdominal:      General: Abdomen is flat. Bowel sounds are normal.      Palpations: Abdomen is soft.      Tenderness: There is abdominal tenderness (Moderate upper abdominal). There is no right CVA tenderness or left CVA tenderness.      Hernia: A hernia (Ventral hernia that is easily reducible) is present.   Skin:     General: Skin is warm and dry.      Findings: No rash.   Neurological:      General: No focal deficit present.      Mental Status: He is alert and oriented to person, place, and time.         Procedures           ED Course  ED Course as of 12/07/24 1538   Sat Dec 07, 2024   1137 The hematocrit is 51, the CBC is otherwise unremarkable [RC]   1232 The high-sensitivity troponin is 22 and elevated [RC]   1232 There is no old  high-sensitivity troponin to compare to.  Patient will have a  2-hour troponin drawn. [RC]   1232 Lactic acid is 2.3 and elevated. [RC]   1233 The urinalysis shows glucose greater than 1000, ketones 40 mg/dL, blood trace, protein trace, leukocytes negative, nitrite negative, RBC 6-10, WBCs 9, bacteria 9, urinalysis is otherwise unremarkable. [RC]   1233 The lipase is 241 and elevated. [RC]   1233 5 years ago a lipase was 86. [RC]   1423 Repeat 2-hour troponin is 22 with a delta T of 0. [RC]   1425 The glucose is 599, sodium 124, chloride 87, CO2 18, alk phos of 120, GFR 60, CMP is otherwise unremarkable [RC]   1514 The venous blood gas shows a pH of 7.29, pCO2 of 51, pO2 of less than 30.  This is a venous blood gas sample. [RC]      ED Course User Index  [RC] Jassi Head MD      12:30 EST, 12/07/24:  The EKG was obtained at 1213 read by me at 1213.  EKG shows sinus tachycardia with rate of 106.  There is a normal axis with no hypertrophy.  The AL is prolonged at 219 ms, the QRS and QT intervals are unremarkable.  There is left atrial enlargement.  There is poor anterior R wave progression.  There is no acute ST elevation or depression.  EKG today compared to EKG dated 12/9/2021 is essentially unchanged    14:31 EST, 12/07/24:  The patient was reassessed.  The patient states that he has moderate pain, intermittently he has waves of severe pain.  His vital signs were reviewed and are stable.  Abdominal exam: Soft, moderate epigastric tenderness, no rebound, no guarding, no masses, positive bowel sounds    14:32 EST, 12/07/24:  Patient's diagnosis of acute pancreatitis and DKA was discussed with the patient.  The patient will be admitted for correction of his blood sugar, hydration, and further evaluation of his pancreatitis.  The treatment plan was discussed with the patient.  All of his questions were answered.  The patient's lactic is only mildly elevated, with a normal white blood cell count.  The patient is  in DKA.  There is no source of infection at this time.  The patient has had blood cultures drawn.  Dr. Diaz will further evaluate the patient and assess the need if needed for antibiotic therapy at that time.    14:32 EST, 12/07/24:  Dr. Diaz, on-call for the hospitalist has been paged out.  She returned call and will admit the patient to the ICU.                                               Medical Decision Making      Final diagnoses:   Idiopathic acute pancreatitis without infection or necrosis   Diabetic ketoacidosis without coma associated with diabetes mellitus due to underlying condition       ED Disposition  ED Disposition       None            No follow-up provider specified.       Medication List      No changes were made to your prescriptions during this visit.       No orders to display     Labs Reviewed   RAINBOW DRAW    Narrative:     The following orders were created for panel order [facility] Draw.  Procedure                               Abnormality         Status                     ---------                               -----------         ------                     Green Top (Gel)[[id number]]                                  In process                 Lavender Top[[id number]]                                                                Gold Top - SST[[id number]]                                   In process                 Light Blue Top[[id number]]                                   In process                   Please view results for these tests on the individual orders.   URINALYSIS W/ MICROSCOPIC IF INDICATED (NO CULTURE)   COMPREHENSIVE METABOLIC PANEL   CBC WITH AUTO DIFFERENTIAL   GREEN TOP   LAVENDER TOP   GOLD TOP - SST   LIGHT BLUE TOP   CBC AND DIFFERENTIAL    Narrative:     The following orders were created for panel order CBC & Differential.  Procedure                               Abnormality         Status                     ---------                               -----------          ------                     CBC Auto Differential[[id number]]                                                         Please view results for these tests on the individual orders.     No results found.    .rndnote       Jassi Head MD  12/07/24 1542       Jassi Head MD  12/07/24 1544

## 2024-12-07 NOTE — H&P
[facility] HOSPITALIST     Trae Groves,     CHIEF COMPLAINT: Abdominal pain    HISTORY OF PRESENT ILLNESS:    Mr. Lin is a 75 y/o male with CAD s/p multiple PCI, uncontrolled T2DM, HTN and HLD who presented to the ED with several days of abdominal pain that became so severe prompting him to come to the hospital. In the ER he underwent CT and labs which returned concerning for pancreatitis as well as severe hyperglycemia.     Patient tells me he is nonadherent with many of his oral home meds as well as his insulin regimen. He has been on insulin for 20+ years but only administers it about 2-3 times per week and it has been about 2 days since he used any insulin at all stating it is too cumbersome to keep up with and manage it. He denies any personal history of pancreatitis or hospitalization for pancreas issues but he does think he has been admitted for diabetes although he does not know why or when. He endorses very poor appetite and feeling dehydrated. Denies EtOH or rec drug use, has never been a drinker.       Past Medical History:   Diagnosis Date    Congestive cardiac failure     Coronary artery disease     COVID-19 07/2020    hospitalized for 24 days    COVID-19     Diabetes mellitus     Heart disease     Hyperlipidemia     Hypertension     Shortness of breath      Past Surgical History:   Procedure Laterality Date    APPENDECTOMY      BACK SURGERY      CARDIAC CATHETERIZATION N/A 05/19/2020    Procedure: Left Heart Cath;  Surgeon: Trae Kemp MD;  Location: [facility] CATH INVASIVE LOCATION;  Service: Cardiovascular;  Laterality: N/A;    CARDIAC CATHETERIZATION N/A 05/19/2020    Procedure: Coronary angiography;  Surgeon: Trae Kemp MD;  Location: [facility] CATH INVASIVE LOCATION;  Service: Cardiovascular;  Laterality: N/A;    CARDIAC CATHETERIZATION N/A 05/19/2020    Procedure: Left ventriculography;  Surgeon: Trae Kemp MD;  Location: [facility] CATH INVASIVE  LOCATION;  Service: Cardiovascular;  Laterality: N/A;    CARDIAC CATHETERIZATION N/A 05/19/2020    Procedure: Resting Full Cycle Ratio;  Surgeon: Trae Kemp MD;  Location:  [facility] CATH INVASIVE LOCATION;  Service: Cardiovascular;  Laterality: N/A;    CARDIAC CATHETERIZATION N/A 05/19/2020    Procedure: Stent KENNEDY coronary;  Surgeon: Trae Kemp MD;  Location:  [facility] CATH INVASIVE LOCATION;  Service: Cardiovascular;  Laterality: N/A;    CARDIAC CATHETERIZATION N/A 05/19/2020    Procedure: Percutaneous Coronary Intervention;  Surgeon: Trae Kemp MD;  Location:  [facility] CATH INVASIVE LOCATION;  Service: Cardiovascular;  Laterality: N/A;    CARDIAC SURGERY      x3    CERVICAL SPINE SURGERY      x4    CYSTOSCOPY TRANSURETHRAL RESECTION OF PROSTATE N/A 02/05/2021    Procedure: CYSTOSCOPY TRANSURETHRAL RESECTION OF PROSTATE;  Surgeon: Jamar Diez MD;  Location: [facility] OR;  Service: Urology;  Laterality: N/A;    CYSTOSCOPY W/ LASER LITHOTRIPSY      KIDNEY STONE SURGERY       Family History   Problem Relation Age of Onset    Heart attack Mother     Emphysema Father     Heart attack Father     Stroke Father     Arthritis Father     Arthritis Sister     Sudden death Brother         shot    No Known Problems Daughter     No Known Problems Son     No Known Problems Maternal Grandmother     No Known Problems Maternal Grandfather     Emphysema Paternal Grandmother     Diabetes Paternal Grandfather     No Known Problems Sister     No Known Problems Sister     No Known Problems Sister     No Known Problems Sister     No Known Problems Sister     No Known Problems Daughter     No Known Problems Daughter      Social History     Tobacco Use    Smoking status: Never    Smokeless tobacco: Never   Vaping Use    Vaping status: Never Used   Substance Use Topics    Alcohol use: Not Currently     Comment: Caffeine use: 2-3 cokes a week    Drug use: Never     Medications Prior to Admission   Medication Sig Dispense  Refill Last Dose/Taking    HYDROcodone-acetaminophen (HYCET) 7.5-325 MG/15ML solution Every 6 (Six) Hours.   12/7/2024    insulin glargine (LANTUS) 100 UNIT/ML injection Inject 30 Units under the skin into the appropriate area as directed 2 (Two) Times a Day.   12/6/2024    insulin lispro (humaLOG) 100 UNIT/ML injection Inject  under the skin into the appropriate area as directed 3 (Three) Times a Day Before Meals.   12/7/2024    Insulin Lispro (HumaLOG) 100 UNIT/ML solution cartridge See Admin Instructions.   12/7/2024    Insulin Lispro, 0.5 Unit Dial, (HUMALOG) 100 UNIT/ML solution pen-injector Humalog KwikPen Insulin   sliding scale   12/7/2024    losartan (COZAAR) 25 MG tablet    12/6/2024    metoprolol succinate XL (TOPROL-XL) 50 MG 24 hr tablet Take 1 tablet by mouth 2 (Two) Times a Day.   Past Week    omeprazole (priLOSEC) 40 MG capsule Take 1 capsule by mouth Daily.   12/6/2024    tamsulosin (FLOMAX) 0.4 MG capsule 24 hr capsule Take 1 capsule by mouth Daily.   Past Week    Aspirin 81 MG capsule Daily.       atorvastatin (LIPITOR) 40 MG tablet Take 1 tablet by mouth Daily.       clopidogrel (PLAVIX) 75 MG tablet    More than a month    DULoxetine (CYMBALTA) 20 MG capsule Take 1 capsule by mouth.   More than a month    fluticasone (FLONASE) 50 MCG/ACT nasal spray 2 sprays into the nostril(s) as directed by provider Daily for 7 days. 9.9 mL 0     glipizide (GLUCOTROL) 10 MG tablet Take 1 tablet by mouth 2 (Two) Times a Day Before Meals.   More than a month    isosorbide dinitrate (ISORDIL) 30 MG tablet TAKE 2 TABLETS EVERY  tablet 3 More than a month    ketoconazole (NIZORAL) 2 % cream        metFORMIN (GLUCOPHAGE) 500 MG tablet        metFORMIN ER (GLUCOPHAGE-XR) 750 MG 24 hr tablet Take 1 tablet by mouth 2 (two) times a day.       Metoprolol Succinate 100 MG capsule extended-release 24 hour sprinkle metoprolol succinate   50 mg bid       mupirocin (BACTROBAN) 2 % ointment        simvastatin (ZOCOR) 40  "MG tablet Take 1 tablet by mouth Daily.        Allergies:  Gabapentin    REVIEW OF SYSTEMS:  Please see the above history of present illness for pertinent positives and negatives.  The remainder of the patient's systems have been reviewed and are negative.    Vital Signs  Temp:  [97.6 °F (36.4 °C)-97.8 °F (36.6 °C)] 97.8 °F (36.6 °C)  Heart Rate:  [106-113] 113  Resp:  [16-20] 16  BP: (124-163)/() 127/73  Oxygen Therapy  SpO2: 95 %  Pulse Oximetry Type: Continuous  Device (Oxygen Therapy): room air}  Body mass index is 33.47 kg/m².  Flowsheet Rows      Flowsheet Row First Filed Value   Admission Height 162.6 cm (64\") Documented at 12/07/2024 1050   Admission Weight 88.5 kg (195 lb) Documented at 12/07/2024 1050               Physical Exam:  Physical Exam  Constitutional:       General: He is not in acute distress.     Comments: Disheveled appearance. Mostly alert and wakes to conversate but is intermittently drowsy   HENT:      Head: Normocephalic and atraumatic.      Mouth/Throat:      Mouth: Mucous membranes are moist.      Pharynx: Oropharynx is clear.   Eyes:      Extraocular Movements: Extraocular movements intact.      Pupils: Pupils are equal, round, and reactive to light.   Cardiovascular:      Rate and Rhythm: Tachycardia present.   Pulmonary:      Effort: Pulmonary effort is normal. No respiratory distress.   Abdominal:      General: There is no distension.      Tenderness: There is no guarding or rebound.      Comments: Epigastric region tender to palpation   Musculoskeletal:         General: No deformity or signs of injury.      Cervical back: Normal range of motion.   Skin:     General: Skin is warm and dry.   Neurological:      Mental Status: He is oriented to person, place, and time.   Psychiatric:         Mood and Affect: Mood normal.        Results Review:    I reviewed the patient's new clinical results.  Lab Results (most recent)       Procedure Component Value Units Date/Time    POC Glucose " Once [[id number]]  (Abnormal) Collected: 12/07/24 1750    Specimen: Blood Updated: 12/07/24 1756     Glucose 408 mg/dL     Osmolality, Serum [[id number]] Collected: 12/07/24 1740    Specimen: Blood from Hand, Left Updated: 12/07/24 1755    Ketone Bodies, Serum (Not performed at [facility]) [[id number]]  (Normal) Collected: 12/07/24 1740    Specimen: Blood Updated: 12/07/24 1755    Narrative:      The following orders were created for panel order Ketone Bodies, Serum (Not performed at [facility]).  Procedure                               Abnormality         Status                     ---------                               -----------         ------                     Acetone[[id number]]                      Normal              Final result                 Please view results for these tests on the individual orders.    Acetone [[id number]]  (Normal) Collected: 12/07/24 1740    Specimen: Blood Updated: 12/07/24 1755     Acetone Negative    Basic Metabolic Panel [[id number]] Collected: 12/07/24 1740    Specimen: Blood from Hand, Left Updated: 12/07/24 1753    Magnesium [[id number]] Collected: 12/07/24 1740    Specimen: Blood from Hand, Left Updated: 12/07/24 1753    Phosphorus [[id number]] Collected: 12/07/24 1740    Specimen: Blood from Hand, Left Updated: 12/07/24 1753    Hemoglobin A1c [[id number]] Collected: 12/07/24 1741    Specimen: Blood Updated: 12/07/24 1741    Comprehensive Metabolic Panel [[id number]]  (Abnormal) Collected: 12/07/24 1351    Specimen: Blood Updated: 12/07/24 1733     Glucose 564 mg/dL      BUN 22 mg/dL      Creatinine 1.28 mg/dL      Sodium 127 mmol/L      Potassium 5.3 mmol/L      Comment: Slight hemolysis detected by analyzer. Result may be falsely elevated.        Chloride 87 mmol/L      CO2 19.2 mmol/L      Calcium 9.5 mg/dL      Total Protein 7.5 g/dL      Albumin 4.0 g/dL      ALT (SGPT) 9 U/L      AST (SGOT) 14 U/L      Comment: Slight hemolysis detected by analyzer. Result may be falsely  elevated.        Alkaline Phosphatase 121 U/L      Total Bilirubin 0.4 mg/dL      Globulin 3.5 gm/dL      A/G Ratio 1.1 g/dL      BUN/Creatinine Ratio 17.2     Anion Gap 20.8 mmol/L      eGFR 58.7 mL/min/1.73     Narrative:      GFR Normal >60  Chronic Kidney Disease <60  Kidney Failure <15    The GFR formula is only valid for adults with stable renal function between ages 18 and 70.    Magnesium [[id number]]  (Normal) Collected: 12/07/24 1351    Specimen: Blood Updated: 12/07/24 1724     Magnesium 1.9 mg/dL     Phosphorus [[id number]]  (Normal) Collected: 12/07/24 1351    Specimen: Blood Updated: 12/07/24 1716     Phosphorus 4.4 mg/dL     POC Glucose Once [[id number]]  (Abnormal) Collected: 12/07/24 1525    Specimen: Blood Updated: 12/07/24 1532     Glucose 445 mg/dL     STAT Lactic Acid, Reflex [[id number]]  (Normal) Collected: 12/07/24 1506    Specimen: Blood Updated: 12/07/24 1526     Lactate 2.0 mmol/L     Blood Gas, Venous - [[id number]]  (Abnormal) Collected: 12/07/24 1500    Specimen: Venous Blood Updated: 12/07/24 1512     Site Nurse/Dr Draw     pH, Venous 7.295 pH Units      pCO2, Venous 51.1 mm Hg      Comment: 83 Value above reference range        pO2, Venous <30.1 mm Hg      Comment: 94 Value below reportable range < 30.1        HCO3, Venous 24.8 mmol/L      Base Excess, Venous -2.6 mmol/L      Comment: 84 Value below reference range        O2 Saturation, Venous 36.7 %      Comment: 84 Value below reference range        Hemoglobin, Blood Gas 17.7 g/dL      Temperature 37.0     Barometric Pressure for Blood Gas 744 mmHg      Modality RA     Notified Who rb and v dr martinez     Collected by [id number]     Comment: Meter: [id number]     :  [id number]       High Sensitivity Troponin T 2Hr [[id number]]  (Abnormal) Collected: 12/07/24 1351    Specimen: Blood Updated: 12/07/24 1420     HS Troponin T 22 ng/L      Troponin T Delta 0 ng/L     Narrative:      High Sensitive Troponin T Reference Range:  <14.0  ng/L- Negative Female for AMI  <22.0 ng/L- Negative Male for AMI  >=14 - Abnormal Female indicating possible myocardial injury.  >=22 - Abnormal Male indicating possible myocardial injury.   Clinicians would have to utilize clinical acumen, EKG, Troponin, and serial changes to determine if it is an Acute Myocardial Infarction or myocardial injury due to an underlying chronic condition.         Blood Culture - Blood, Arm, Right [[id number]] Collected: 12/07/24 1320    Specimen: Blood from Arm, Right Updated: 12/07/24 1333    Blood Culture - Blood, Arm, Right [[id number]] Collected: 12/07/24 1320    Specimen: Blood from Arm, Right Updated: 12/07/24 1332    [place] Draw [[id number]] Collected: 12/07/24 1108    Specimen: Blood Updated: 12/07/24 1302    Narrative:      The following orders were created for panel order [place] Draw.  Procedure                               Abnormality         Status                     ---------                               -----------         ------                     Green Top (Gel)[[id number]]                                  Final result               Lavender Top[[id number]]                                     Final result               Gold Top - SST[[id number]]                                   Final result               Light Blue Top[[id number]]                                   Final result                 Please view results for these tests on the individual orders.    Lavender Top [[id number]] Collected: 12/07/24 1201    Specimen: Blood Updated: 12/07/24 1302     Extra Tube hold for add-on     Comment: Auto resulted       Comprehensive Metabolic Panel [[id number]]  (Abnormal) Collected: 12/07/24 1155    Specimen: Blood Updated: 12/07/24 1242     Glucose 599 mg/dL      BUN 23 mg/dL      Creatinine 1.25 mg/dL      Sodium 124 mmol/L      Potassium 4.7 mmol/L      Comment: Slight hemolysis detected by analyzer. Result may be falsely elevated.        Chloride 87 mmol/L      CO2 18.2  mmol/L      Calcium 9.1 mg/dL      Total Protein 7.3 g/dL      Albumin 3.7 g/dL      ALT (SGPT) 6 U/L      AST (SGOT) 13 U/L      Comment: Slight hemolysis detected by analyzer. Result may be falsely elevated.        Alkaline Phosphatase 120 U/L      Total Bilirubin 0.4 mg/dL      Globulin 3.6 gm/dL      A/G Ratio 1.0 g/dL      BUN/Creatinine Ratio 18.4     Anion Gap 18.8 mmol/L      eGFR 60.4 mL/min/1.73     Narrative:      GFR Normal >60  Chronic Kidney Disease <60  Kidney Failure <15    The GFR formula is only valid for adults with stable renal function between ages 18 and 70.    Lipase [[id number]]  (Abnormal) Collected: 12/07/24 1155    Specimen: Blood Updated: 12/07/24 1230     Lipase 241 U/L     High Sensitivity Troponin T [[id number]]  (Abnormal) Collected: 12/07/24 1155    Specimen: Blood Updated: 12/07/24 1230     HS Troponin T 22 ng/L     Narrative:      High Sensitive Troponin T Reference Range:  <14.0 ng/L- Negative Female for AMI  <22.0 ng/L- Negative Male for AMI  >=14 - Abnormal Female indicating possible myocardial injury.  >=22 - Abnormal Male indicating possible myocardial injury.   Clinicians would have to utilize clinical acumen, EKG, Troponin, and serial changes to determine if it is an Acute Myocardial Infarction or myocardial injury due to an underlying chronic condition.         Lactic Acid, Plasma [[id number]]  (Abnormal) Collected: 12/07/24 1155    Specimen: Blood Updated: 12/07/24 1229     Lactate 2.3 mmol/L     Urinalysis, Microscopic Only - Urine, Clean Catch [[id number]]  (Abnormal) Collected: 12/07/24 1155    Specimen: Urine, Clean Catch Updated: 12/07/24 1227     RBC, UA 6-10 /HPF      WBC, UA None Seen /HPF      Bacteria, UA None Seen /HPF      Squamous Epithelial Cells, UA 0-2 /HPF      Hyaline Casts, UA None Seen /LPF      Methodology Manual Light Microscopy    Urinalysis With Microscopic If Indicated (No Culture) - Urine, Clean Catch [[id number]]  (Abnormal) Collected: 12/07/24 1155     Specimen: Urine, Clean Catch Updated: 12/07/24 1210     Color, UA Straw     Appearance, UA Clear     pH, UA 5.5     Specific Gravity, UA 1.015     Glucose, UA >=1000 mg/dL (3+)     Ketones, UA 40 mg/dL (2+)     Bilirubin, UA Negative     Blood, UA Trace     Protein, UA Trace     Leuk Esterase, UA Negative     Nitrite, UA Negative     Urobilinogen, UA 0.2 E.U./dL    CBC & Differential [[id number]]  (Abnormal) Collected: 12/07/24 1108    Specimen: Blood Updated: 12/07/24 1134    Narrative:      The following orders were created for panel order CBC & Differential.  Procedure                               Abnormality         Status                     ---------                               -----------         ------                     CBC Auto Differential[[id number]]        Abnormal            Final result                 Please view results for these tests on the individual orders.    CBC Auto Differential [[id number]]  (Abnormal) Collected: 12/07/24 1108    Specimen: Blood Updated: 12/07/24 1134     WBC 10.16 10*3/mm3      RBC 5.76 10*6/mm3      Hemoglobin 17.6 g/dL      Hematocrit 51.4 %      MCV 89.2 fL      MCH 30.6 pg      MCHC 34.2 g/dL      RDW 13.1 %      RDW-SD 42.0 fl      MPV 12.0 fL      Platelets 193 10*3/mm3      Neutrophil % 68.8 %      Lymphocyte % 22.9 %      Monocyte % 6.5 %      Eosinophil % 0.5 %      Basophil % 1.0 %      Immature Grans % 0.3 %      Neutrophils, Absolute 6.99 10*3/mm3      Lymphocytes, Absolute 2.33 10*3/mm3      Monocytes, Absolute 0.66 10*3/mm3      Eosinophils, Absolute 0.05 10*3/mm3      Basophils, Absolute 0.10 10*3/mm3      Immature Grans, Absolute 0.03 10*3/mm3      nRBC 0.0 /100 WBC     Green Top (Gel) [[id number]] Collected: 12/07/24 1108    Specimen: Blood Updated: 12/07/24 1130     Extra Tube Hold for add-ons.     Comment: Auto resulted.       Gold Top - SST [[id number]] Collected: 12/07/24 1108    Specimen: Blood Updated: 12/07/24 1130     Extra Tube Hold for add-ons.      Comment: Auto resulted.       Light Blue Top [[id number]] Collected: 12/07/24 1108    Specimen: Blood Updated: 12/07/24 1130     Extra Tube Hold for add-ons.     Comment: Auto resulted               Imaging Results (Most Recent)       Procedure Component Value Units Date/Time    XR Chest 1 View [[id number]] Collected: 12/07/24 1748     Updated: 12/07/24 1753    Narrative:      XR CHEST 1 VW    Date of Exam: 12/7/2024 5:08 PM EST    Indication: Assess for Fluid Overload  Assess for Fluid Overload in the hospital for pancreatitis. 74-year-old    Comparison: Single view chest dated 5/18/2020, chest CT without contrast dated 9/23/2020    Findings:  The lungs are radiographically clear. There are calcified pleural plaques which are unchanged left greater than right compatible with prior asbestos exposure. Cardiac, hilar, mediastinal silhouettes are stable. Heart size is mildly prominent. Coronary   artery stent is now noted. Pulmonary vascularity is within normal limits. No pneumothorax or pleural effusion. There is hardware in the lower cervical spine. An acute bony abnormality is not seen.      Impression:      Impression:  1.Stable mild cardiomegaly. No active cardiopulmonary disease.  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        Electronically Signed: Wil Shaffer DO    12/7/2024 5:50 PM EST    Workstation ID: [id number]    CT Abdomen Pelvis Without Contrast [[id number]] Collected: 12/07/24 1406     Updated: 12/07/24 1427    Narrative:      CT ABDOMEN PELVIS WO CONTRAST    Date of Exam: 12/7/2024 1:41 PM EST    Indication: Upper abdominal pain.    Comparison: CT abdomen pelvis 3/23/2020.    Technique: Axial CT images were obtained of the abdomen and pelvis without the administration of contrast. Sagittal and coronal reconstructions were performed.  Automated exposure control and iterative reconstruction methods were used.    Findings:  Benign calcified granuloma in the left lower lobe  without focal consolidation in the lung bases.    Normal morphology of the liver without evidence of focal liver lesion on this noncontrast exam. Gallbladder is unremarkable. No biliary ductal dilatation.    There is peripancreatic inflammatory stranding and trace free fluid, most pronounced in the region of the uncinate, head, and body. No drainable fluid collection. Unable to assess for pancreatic necrosis without intravenous contrast.     Spleen is normal in size. No distinct adrenal nodule. 6 mm nonobstructing left lower pole stone. No right-sided nephrolithiasis. No hydronephrosis. Urinary bladder is unremarkable. Pelvic reproductive organs are unremarkable. Sigmoid diverticulosis. No   evidence of diverticulitis. No evidence of bowel obstruction.    Atherosclerosis. No abdominal aortic aneurysm. No suspicious lymphadenopathy.    Scattered subcutaneous injection granulomas. No subcutaneous fluid collection. Multilevel spondylosis with lower lumbar posterior fusion hardware. No acute or suspicious osseous lesion.      Impression:      Impression:  Findings of acute pancreatitis without drainable fluid collection.      Electronically Signed: Meño Pruett MD    12/7/2024 2:13 PM EST    Workstation ID: [id number]          reviewed    ECG/EMG Results (most recent)       Procedure Component Value Units Date/Time    ECG 12 Lead Chest Pain [[id number]] Collected: 12/07/24 1213     Updated: 12/07/24 1214     QT Interval 324 ms      QTC Interval 431 ms     Narrative:      HEART EQMX=611  bpm  RR Xzavbaqe=953  ms  PA Glgtwanh=545  ms  P Horizontal Axis=8  deg  P Front Axis=12  deg  QRSD Interval=84  ms  QT Qxwdgbub=361  ms  ZGlJ=098  ms  QRS Axis=60  deg  T Wave Axis=85  deg  - ABNORMAL ECG -  Sinus tachycardia  Prolonged PA interval  Probable left atrial enlargement  Consider anterior infarct  Date and Time of Study:2024-12-07 12:13:40          reviewed    Assessment & Plan     Acute pancreatitis-  Lipase elevated to 240  with significant epigastric abd pain. NPO, pain control with prn IV dilaudid, fluids.     DKA-  pH 7.29, bG elevated to 500's, anion gap and bicarb with presence of urinary ketones all consistent with DKA. Start protocol with glucommader and keep NPO until gap closes but will also need to ensure he is able to tolerate PO with concurrent acute pancreatitis.     Poorly controlled T2DM-  Once DKA resolved and patient is stable he will need DM educator. With his home insulin requirements and inability to manage meds at home now with admission for DKA I wonder if he would qualify for CGM and pump?     CAD s/p multiple PCIs-  Supposed to be on ASA and statin from last interventional cardiology note I can find. Patient admits to not taking home meds regularly. Will restart both here as he has had restenosis of stents in the past. Resume home toprol XL and imdur as well.     HTN- resume home losartan    HLD- resume home statin    I discussed the patient's findings and my recommendations with patient.     Liz Diaz MD  12/07/24  18:02 EST    Time: Critical care 30 min

## 2024-12-08 LAB
BASOPHILS # BLD AUTO: 0.11 10*3/MM3 (ref 0–0.2)
BASOPHILS NFR BLD AUTO: 1 % (ref 0–1.5)
DEPRECATED RDW RBC AUTO: 42.9 FL (ref 37–54)
EOSINOPHIL # BLD AUTO: 0.03 10*3/MM3 (ref 0–0.4)
EOSINOPHIL NFR BLD AUTO: 0.3 % (ref 0.3–6.2)
ERYTHROCYTE [DISTWIDTH] IN BLOOD BY AUTOMATED COUNT: 13.2 % (ref 12.3–15.4)
HCT VFR BLD AUTO: 49 % (ref 37.5–51)
HGB BLD-MCNC: 16.6 G/DL (ref 13–17.7)
IMM GRANULOCYTES # BLD AUTO: 0.05 10*3/MM3 (ref 0–0.05)
IMM GRANULOCYTES NFR BLD AUTO: 0.4 % (ref 0–0.5)
LYMPHOCYTES # BLD AUTO: 2.29 10*3/MM3 (ref 0.7–3.1)
LYMPHOCYTES NFR BLD AUTO: 20.1 % (ref 19.6–45.3)
MCH RBC QN AUTO: 30.5 PG (ref 26.6–33)
MCHC RBC AUTO-ENTMCNC: 33.9 G/DL (ref 31.5–35.7)
MCV RBC AUTO: 89.9 FL (ref 79–97)
MONOCYTES # BLD AUTO: 0.84 10*3/MM3 (ref 0.1–0.9)
MONOCYTES NFR BLD AUTO: 7.4 % (ref 5–12)
NEUTROPHILS NFR BLD AUTO: 70.8 % (ref 42.7–76)
NEUTROPHILS NFR BLD AUTO: 8.06 10*3/MM3 (ref 1.7–7)
NRBC BLD AUTO-RTO: 0 /100 WBC (ref 0–0.2)
OSMOLALITY SERPL: 312 MOSM/KG (ref 280–301)
PLATELET # BLD AUTO: 187 10*3/MM3 (ref 140–450)
PMV BLD AUTO: 11.6 FL (ref 6–12)
RBC # BLD AUTO: 5.45 10*6/MM3 (ref 4.14–5.8)
WBC NRBC COR # BLD AUTO: 11.38 10*3/MM3 (ref 3.4–10.8)

## 2024-12-08 NOTE — NURSING NOTE
Pt going AMA    Granddaughter at bedside - Christel Moses    Both verbalized understanding of risks of leaving AMA and benefits of staying    Pt refused to sign    Granddaughter signed    Taken by w/c to car

## 2024-12-08 NOTE — PLAN OF CARE
Called by RN as patient refused to have IV in AC fossa of arm.  RN attempted to move IV, and patient slung his arm, IV fell out, and he said he will pull out any IV that is placed in his arm.  I had a long talk with  patient and daughter at bedside.  I discussed how he would benefit from fluids and insulin gtt, but patient is adamant he doesn't want IV placed.  Will d/c Insulin gtt.  Start Lantus 30 U BID along with SSI.  Continue to monitor labs closely.

## 2024-12-08 NOTE — NURSING NOTE
Pt insulin gtt beeping frequently, family member observed on camera by 2 nurses touching pump to silence it,told not to touch pumps or silence them , family member stated pt had also been touching pump  Attempted to fix iv that caused beeping and redress, pt then deliberatly moved arm when was dressing off , raised arm in air, iv became dislodged, pt is hard stick . Found an ac vein in opposite arm and just before stick pt started yelling he didn't want iv there because it would beep, attempted to explain he is poor stick and iv can be far enough below to  not beep, pt continued yelling and stated he didn't want an iv . Attempted to explain his gtt is off and he stated he didn't care , he didn't want an iv. Insulin gtt off at 1930

## 2024-12-12 LAB
BACTERIA SPEC AEROBE CULT: NORMAL
BACTERIA SPEC AEROBE CULT: NORMAL

## 2025-01-14 LAB — GLUCOSE BLDC GLUCOMTR-MCNC: 445 MG/DL (ref 70–130)

## (undated) DEVICE — CATH GUIDE ZUMA2 EBU 6F 3.5X100CM

## (undated) DEVICE — TREK CORONARY DILATATION CATHETER 2.75 MM X 15 MM / RAPID-EXCHANGE: Brand: TREK

## (undated) DEVICE — RADIFOCUS OPTITORQUE ANGIOGRAPHIC CATHETER: Brand: OPTITORQUE

## (undated) DEVICE — ELECTRD LP CUT 24/26F YEL

## (undated) DEVICE — KT MANIFLD CARDIAC

## (undated) DEVICE — BG DRN URINE ANTIREFLUX SMPL PRT 4000ML

## (undated) DEVICE — GLIDESHEATH SLENDER STAINLESS STEEL KIT: Brand: GLIDESHEATH SLENDER

## (undated) DEVICE — CATH FOL SIMPLASTIC 3WY 22F 30CC

## (undated) DEVICE — GLV SURG SENSICARE PI MIC PF SZ7.5 LF STRL

## (undated) DEVICE — NC TREK CORONARY DILATATION CATHETER 3.0 MM X 15 MM / RAPID-EXCHANGE: Brand: NC TREK

## (undated) DEVICE — PK CATH CARD 40

## (undated) DEVICE — TRANSPOSAL ULTRAFLEX DUO/QUAD ULTRA CART MANIFOLD

## (undated) DEVICE — Device: Brand: PROWATER

## (undated) DEVICE — APRON CYSTO

## (undated) DEVICE — GW EMR FIX EXCHG J STD .035 3MM 260CM

## (undated) DEVICE — CATH VENT MIV RADL PIG ST TIP 5F 110CM

## (undated) DEVICE — TUBING, SUCTION, 1/4" X 12', STRAIGHT: Brand: MEDLINE

## (undated) DEVICE — NC TREK CORONARY DILATATION CATHETER 3.5 MM X 12 MM / RAPID-EXCHANGE: Brand: NC TREK

## (undated) DEVICE — 30210 SINGLE USE ARM STRAP: Brand: 30210 SINGLE USE ARM STRAP

## (undated) DEVICE — TR BAND RADIAL ARTERY COMPRESSION DEVICE: Brand: TR BAND

## (undated) DEVICE — TOWEL,OR,DSP,ST,BLUE,STD,4/PK,20PK/CS: Brand: MEDLINE

## (undated) DEVICE — PK CYSTO 90

## (undated) DEVICE — GW PRESSUREWIRE AERIS W/ AGILE TP 175CM

## (undated) DEVICE — ELECTRD 24F27050N

## (undated) DEVICE — IRRIGATOR TOOMEY 70CC

## (undated) DEVICE — DEV SECUREMENT LEG CATH ADHS

## (undated) DEVICE — Device: Brand: ANGIOSCULPT® PTCA SCORING BALLOON CATHETER

## (undated) DEVICE — PAD GRND REM POLYHESIVE A/ DISP